# Patient Record
Sex: MALE | Race: WHITE
[De-identification: names, ages, dates, MRNs, and addresses within clinical notes are randomized per-mention and may not be internally consistent; named-entity substitution may affect disease eponyms.]

---

## 2017-09-27 ENCOUNTER — HOSPITAL ENCOUNTER (OUTPATIENT)
Dept: HOSPITAL 62 - ER | Age: 72
Setting detail: OBSERVATION
LOS: 1 days | Discharge: ANOTHER HOSPITAL | End: 2017-09-28
Attending: INTERNAL MEDICINE | Admitting: INTERNAL MEDICINE
Payer: COMMERCIAL

## 2017-09-27 DIAGNOSIS — M06.9: ICD-10-CM

## 2017-09-27 DIAGNOSIS — I25.10: ICD-10-CM

## 2017-09-27 DIAGNOSIS — Z79.899: ICD-10-CM

## 2017-09-27 DIAGNOSIS — Z95.828: ICD-10-CM

## 2017-09-27 DIAGNOSIS — I21.4: Primary | ICD-10-CM

## 2017-09-27 DIAGNOSIS — E78.5: ICD-10-CM

## 2017-09-27 DIAGNOSIS — J44.9: ICD-10-CM

## 2017-09-27 DIAGNOSIS — I48.0: ICD-10-CM

## 2017-09-27 DIAGNOSIS — Z79.02: ICD-10-CM

## 2017-09-27 DIAGNOSIS — Z95.5: ICD-10-CM

## 2017-09-27 DIAGNOSIS — Z98.890: ICD-10-CM

## 2017-09-27 DIAGNOSIS — Z82.49: ICD-10-CM

## 2017-09-27 DIAGNOSIS — Z79.52: ICD-10-CM

## 2017-09-27 DIAGNOSIS — Z79.82: ICD-10-CM

## 2017-09-27 DIAGNOSIS — I73.9: ICD-10-CM

## 2017-09-27 PROCEDURE — 80048 BASIC METABOLIC PNL TOTAL CA: CPT

## 2017-09-27 PROCEDURE — 80061 LIPID PANEL: CPT

## 2017-09-27 PROCEDURE — 93010 ELECTROCARDIOGRAM REPORT: CPT

## 2017-09-27 PROCEDURE — 84484 ASSAY OF TROPONIN QUANT: CPT

## 2017-09-27 PROCEDURE — G0378 HOSPITAL OBSERVATION PER HR: HCPCS

## 2017-09-27 PROCEDURE — 85025 COMPLETE CBC W/AUTO DIFF WBC: CPT

## 2017-09-27 PROCEDURE — 82553 CREATINE MB FRACTION: CPT

## 2017-09-27 PROCEDURE — 93005 ELECTROCARDIOGRAM TRACING: CPT

## 2017-09-27 PROCEDURE — 71010: CPT

## 2017-09-27 PROCEDURE — 99285 EMERGENCY DEPT VISIT HI MDM: CPT

## 2017-09-27 PROCEDURE — 36415 COLL VENOUS BLD VENIPUNCTURE: CPT

## 2017-09-28 VITALS — DIASTOLIC BLOOD PRESSURE: 63 MMHG | SYSTOLIC BLOOD PRESSURE: 120 MMHG

## 2017-09-28 LAB
ANION GAP SERPL CALC-SCNC: 12 MMOL/L (ref 5–19)
BASOPHILS # BLD AUTO: 0 10^3/UL (ref 0–0.2)
BASOPHILS NFR BLD AUTO: 0.3 % (ref 0–2)
BUN SERPL-MCNC: 34 MG/DL (ref 7–20)
CALCIUM: 9.9 MG/DL (ref 8.4–10.2)
CHLORIDE SERPL-SCNC: 104 MMOL/L (ref 98–107)
CHOLEST SERPL-MCNC: 125.55 MG/DL (ref 0–200)
CO2 SERPL-SCNC: 23 MMOL/L (ref 22–30)
CREAT SERPL-MCNC: 1.18 MG/DL (ref 0.52–1.25)
DIRECT HDL: 40 MG/DL (ref 40–?)
EOSINOPHIL # BLD AUTO: 0.2 10^3/UL (ref 0–0.6)
EOSINOPHIL NFR BLD AUTO: 1.4 % (ref 0–6)
ERYTHROCYTE [DISTWIDTH] IN BLOOD BY AUTOMATED COUNT: 22.7 % (ref 11.5–14)
GLUCOSE SERPL-MCNC: 130 MG/DL (ref 75–110)
HCT VFR BLD CALC: 38.3 % (ref 37.9–51)
HGB BLD-MCNC: 12.3 G/DL (ref 13.5–17)
HGB HCT DIFFERENCE: -1.4
LDLC SERPL DIRECT ASSAY-MCNC: < 30 MG/DL (ref ?–100)
LYMPHOCYTES # BLD AUTO: 0.7 10^3/UL (ref 0.5–4.7)
LYMPHOCYTES NFR BLD AUTO: 6.5 % (ref 13–45)
MCH RBC QN AUTO: 27.7 PG (ref 27–33.4)
MCHC RBC AUTO-ENTMCNC: 32.1 G/DL (ref 32–36)
MCV RBC AUTO: 86 FL (ref 80–97)
MONOCYTES # BLD AUTO: 0.3 10^3/UL (ref 0.1–1.4)
MONOCYTES NFR BLD AUTO: 2.7 % (ref 3–13)
NEUTROPHILS # BLD AUTO: 10.2 10^3/UL (ref 1.7–8.2)
NEUTS SEG NFR BLD AUTO: 89.1 % (ref 42–78)
POTASSIUM SERPL-SCNC: 4.3 MMOL/L (ref 3.6–5)
RBC # BLD AUTO: 4.45 10^6/UL (ref 4.35–5.55)
SODIUM SERPL-SCNC: 138.7 MMOL/L (ref 137–145)
TRIGL SERPL-MCNC: 201 MG/DL (ref ?–150)
VLDLC SERPL CALC-MCNC: 40.2 MG/DL (ref 10–31)
WBC # BLD AUTO: 11.4 10^3/UL (ref 4–10.5)

## 2017-09-28 NOTE — PDOC H&P
History of Present Illness


Admission Date/PCP: 


  09/28/17 02:37





  





Patient complains of: Palpitations and chest pain


History of Present Illness: 


GEOVANNI ISRAEL is a 72 year old male with a past medical history of paroxysmal 

atrial fibrillation, coronary artery disease, COPD, peripheral vascular disease

, rheumatoid arthritis and dyslipidemia who would been in his usual state of 

health until approximately 4 hours prior to presentation noting palpitations, 

tachycardia, retrosternal pressure-like 3 out of 5 chest pain nonradiating 

lasting approximately an hour with unclear exacerbating factors but relieved by 

nitro.  Prompting evaluation emergency room his initial workup was unremarkable 

he is pain-free.  He is referred to the hospitalist for admission.  Patient 

states last episode of paroxysmal atrial fibrillation was approximately 3 

months ago at which time he also had a negative Cardiolite stress test result.  

He denies any recent change in medications, excessive caffeine or alcohol and 

otherwise feels well.








Past Medical History


Cardiac Medical History: Reports: Myocardial Infarction


   Denies: Congestive Heart Failure, Hypertension


Pulmonary Medical History: Reports: Bronchitis, Chronic Obstructive Pulmonary 

Disease (COPD), Pneumonia


   Denies: Asthma, Tuberculosis


Neurological Medical History: 


   Denies: Seizures


Renal/ Medical History: 


   Denies: End Stage Renal Disease


GI Medical History: Reports: Gastroesophageal Reflux Disease


   Denies: Cirrhosis


Musculoskeltal Medical History: Reports: Arthritis


Psychiatric Medical History: 


   Denies: Bipolar Disorder, Depression


Hematology: 


   Denies: Anemia, Bleeding Tendencies





Past Surgical History


Past Surgical History: Reports: Cardiac Catheterization, Cholecystectomy, 

Coronary Stent, Vascular Surgery - Abdominal aortic aneurysm and bilateral 

femoropopliteal graft





Social History


Information Source: Patient


Lives with: Family


Smoking Status: Never Smoker


Frequency of Alcohol Use: None





- Advance Directive


Resuscitation Status: Full Code





Family History


Family History: CAD, COPD, Hypertension


Parental Family History Reviewed: Yes


Children Family History Reviewed: Yes


Sibling(s) Family History Reviewed.: Yes





Medication/Allergy


Allergies/Adverse Reactions: 


 





No Known Allergies Allergy (Unverified 09/28/17 03:56)


 











Review of Systems


Constitutional: ABSENT: chills, fever(s), headache(s), weight gain, weight loss


Eyes: ABSENT: visual disturbances


Ears: ABSENT: hearing changes


Cardiovascular: ABSENT: chest pain, dyspnea on exertion, edema, orthropnea, 

palpitations


Respiratory: ABSENT: cough, hemoptysis


Gastrointestinal: ABSENT: abdominal pain, constipation, diarrhea, hematemesis, 

hematochezia, nausea, vomiting


Genitourinary: ABSENT: dysuria, hematuria


Musculoskeletal: ABSENT: joint swelling


Integumentary: ABSENT: rash, wounds


Neurological: ABSENT: abnormal gait, abnormal speech, confusion, dizziness, 

focal weakness, syncope


Psychiatric: ABSENT: anxiety, depression, homidical ideation, suicidal ideation


Endocrine: ABSENT: cold intolerance, heat intolerance, polydipsia, polyuria


Hematologic/Lymphatic: ABSENT: easy bleeding, easy bruising





Physical Exam


Vital Signs: 


 











Temp Pulse Resp BP Pulse Ox


 


 97.7 F   59 L  17   144/80 H  100 


 


 09/28/17 00:03  09/28/17 00:03  09/28/17 02:28  09/28/17 02:28  09/28/17 02:28








 Intake & Output











 09/26/17 09/27/17 09/28/17





 11:59 11:59 11:59


 


Weight   81.4 kg











General appearance: PRESENT: no acute distress, well-developed, well-nourished


Head exam: PRESENT: atraumatic, normocephalic


Eye exam: PRESENT: conjunctiva pink, EOMI, PERRLA.  ABSENT: scleral icterus


Ear exam: PRESENT: normal external ear exam


Mouth exam: PRESENT: moist, tongue midline


Neck exam: ABSENT: carotid bruit, JVD, lymphadenopathy, thyromegaly


Respiratory exam: PRESENT: clear to auscultation hellen.  ABSENT: rales, rhonchi, 

wheezes


Cardiovascular exam: PRESENT: RRR.  ABSENT: diastolic murmur, rubs, systolic 

murmur


Pulses: PRESENT: normal dorsalis pedis pul


Vascular exam: PRESENT: normal capillary refill


GI/Abdominal exam: PRESENT: normal bowel sounds, soft.  ABSENT: distended, 

guarding, mass, organolmegaly, rebound, tenderness


Rectal exam: PRESENT: deferred


Extremities exam: PRESENT: full ROM.  ABSENT: calf tenderness, clubbing, pedal 

edema


Neurological exam: PRESENT: alert, awake, oriented to person, oriented to place

, oriented to time, oriented to situation, CN II-XII grossly intact.  ABSENT: 

motor sensory deficit


Psychiatric exam: PRESENT: appropriate affect, normal mood.  ABSENT: homicidal 

ideation, suicidal ideation


Skin exam: PRESENT: dry, intact, warm.  ABSENT: cyanosis, rash





Results


Impressions: 


 





Chest X-Ray  09/28/17 00:32


IMPRESSION:  Small right upper lobar atelectasis or scar.


 














Assessment & Plan





- Diagnosis


(1) Coronary artery disease


Is this a current diagnosis for this admission?: Yes   


Plan: 


Observation on a telemetry bed with chest pain care set serial cardiac enzymes, 

lipid profile and TSH.  Given the patient's negative Cardiolite stress test 3 

months ago I will not reorder.  Continue aspirin, beta-blocker, as needed nitro 

and full dose statin








(2) Paroxysmal atrial fibrillation


Is this a current diagnosis for this admission?: Yes   


Plan: 


Patient takes atenolol and Plavix with aspirin will continue Lopressor with 

Plavix and aspirin and monitor telemetry while ambulating








(3) Chest pain


Qualifiers: 


   Chest pain type: unspecified   Qualified Code(s): R07.9 - Chest pain, 

unspecified   


Is this a current diagnosis for this admission?: Yes   


Plan: 


Chest pain care set serial cardiac enzymes.  No Cardiolite stress test given 

negative results 3 months ago.








- Time


Time Spent: 50 to 70 Minutes

## 2017-09-28 NOTE — EKG REPORT
SEVERITY:- OTHERWISE NORMAL ECG -

SINUS RHYTHM

BORDERLINE LEFT AXIS DEVIATION

:

Confirmed by: Shruthi Rivas MD 28-Sep-2017 10:02:57

## 2017-09-28 NOTE — RADIOLOGY REPORT (SQ)
EXAM DESCRIPTION:  CHEST SINGLE VIEW



COMPLETED DATE/TIME:  9/28/2017 1:06 am



REASON FOR STUDY:  chest pain



COMPARISON:  None.



EXAM PARAMETERS:  NUMBER OF VIEWS: One view.

TECHNIQUE: Single frontal radiographic view of the chest acquired.

RADIATION DOSE: NA

LIMITATIONS: None.



FINDINGS:  LUNGS AND PLEURA: Small bandlike atelectasis -scar of the right upper lobe.  Prominent int
erstitium.  Mild hyperinflation.

MEDIASTINUM AND HILAR STRUCTURES: No masses.  Contour normal.

HEART AND VASCULAR STRUCTURES: Heart normal in size.  Atherosclerosis.

BONES: No acute findings.

HARDWARE: None in the chest.

OTHER: No other significant finding.



IMPRESSION:  Small right upper lobar atelectasis or scar.



TECHNICAL DOCUMENTATION:  JOB ID:  [id number]

## 2017-09-28 NOTE — PDOC TRANSFER SUMMARY
General


Admission Date/PCP: 


  09/28/17 02:37





  





Admission Date: 09/28/17


Transfer Date: 09/28/17


Accepting Facility: [facility]


Accepting Physician: Dr. Beaver


Resuscitation Status: Full Code





- Transfer Diagnosis


(1) NSTEMI (non-ST elevated myocardial infarction)


Is this a current diagnosis for this admission?: Yes   





(2) History of AAA (abdominal aortic aneurysm) repair


Is this a current diagnosis for this admission?: Yes   





(3) History of aorto-femoral bypass


Is this a current diagnosis for this admission?: Yes   





(4) Rheumatoid arthritis


Is this a current diagnosis for this admission?: Yes   





(5) COPD (chronic obstructive pulmonary disease)


Is this a current diagnosis for this admission?: Yes   





(6) Coronary artery disease


Is this a current diagnosis for this admission?: Yes   





(7) Paroxysmal atrial fibrillation


Is this a current diagnosis for this admission?: Yes   





- Transfer Medications


Home Medications: 


 





Aspirin [Ecotrin] 81 mg PO DAILY 09/28/17 


Atenolol [Tenormin 50 mg Tablet] 50 mg PO DAILY 09/28/17 


Clopidogrel Bisulfate [Plavix 75 mg Tablet] 75 mg PO DAILY 09/28/17 


Finasteride [Proscar 5 mg Tablet] 5 mg PO DAILY 09/28/17 


Folic Acid 0.4 mg PO DAILY 09/28/17 


Ibuprofen [Motrin 800 mg Tablet] 800 mg PO Q12 09/28/17 


Magnesium 250 mg PO DAILY 09/28/17 


Methotrexate Sodium [Methotrexate] 4 tab PO BID 09/28/17 


Multivitamin [Multivitamins] 1 cap PO DAILY 09/28/17 


Omeprazole 40 mg PO DAILY 09/28/17 


Potassium 200mg Otc 200 mg PO DAILY 09/28/17 


Prednisone [Deltasone 5 mg Tablet] 5 mg PO DAILY 09/28/17 


Simvastatin [Zocor 40 mg Tablet] 40 mg PO QHS 09/28/17 


Tamsulosin HCl [Flomax 0.4 mg Cap.sr] 0.4 mg PO DAILY 09/28/17 








Transfer Medications: 


 Current Medications





Atorvastatin Calcium (Lipitor 80 Mg Tablet)  80 mg PO QHS GILBERTO


   Stop: 10/28/17 21:59


Clopidogrel Bisulfate (Plavix 75 Mg Tablet)  75 mg PO DAILY GILBERTO


   Stop: 10/28/17 09:59


Docusate Sodium (Colace 100 Mg Capsule)  100 mg PO BID GILBERTO


   Stop: 10/28/17 09:59


Enoxaparin Sodium (Lovenox Inj 80 Mg/0.8 Ml Disp.Syrin)  80 mg SUBCUT Q12 GILBERTO


   Stop: 10/28/17 09:59


   Last Admin: 09/28/17 09:00 Dose:  80 mg


Folic Acid (Folvite 1 Mg Tablet)  1 mg PO DAILY GILBERTO


   Stop: 10/28/17 09:59


Metoprolol Tartrate (Lopressor 50 Mg Tablet)  50 mg PO Q12 GILBERTO


   Stop: 10/28/17 09:59


Nitroglycerin (Nitrostat 0.4 Mg (1/150 Gr) Tabs 25/Bottle)  1 tab SL ASDIR PRN


Sodium Chloride (Saline Flush 2.5 Ml Monoject Prefil Syrin)  2.5 ml IV Q8 GILBERTO


   Stop: 10/28/17 05:59


   Last Admin: 09/28/17 06:41 Dose:  2.5 ml











- Allergies


Allergies/Adverse Reactions: 


 





No Known Allergies Allergy (Unverified 09/28/17 03:56)


 











- Diet/Activity


Discharge Diet: Cardiac





Hospital Course


Hospital Course: 





Pt is a 71yo WM with a past medical history of paroxysmal atrial fibrillation, 

coronary artery disease, COPD, peripheral vascular disease, rheumatoid 

arthritis and dyslipidemia who would been in his usual state of health until 

approximately 4 hours prior to presentation noting palpitations, tachycardia, 

retrosternal pressure-like 3 out of 5 chest pain nonradiating lasting 

approximately an hour with unclear exacerbating factors but relieved by nitro.  

Prompting evaluation emergency room his initial workup was unremarkable he is 

pain-free.  He is referred to the hospitalist for admission.  Patient states 

last episode of paroxysmal atrial fibrillation was approximately 3 months ago 

at which time he also had a negative Cardiolite stress test result.  He denies 

any recent change in medications, excessive caffeine or alcohol and otherwise 

feels well.





Patient second troponin positive 2.41 with elevation of CKMB. Patient has no 

further chest pain. No ekg changes. Patient accepted for cardiac cath by Dr. Beaver. Medications which were ordered and not given 2/2 patient refusal per 

documentation. 





 











Generic Name Dose Route Start Last Admin





  Trade Name Freq  PRN Reason Stop Dose Admin


 


Atorvastatin Calcium  80 mg  09/28/17 22:00  





  Lipitor 80 Mg Tablet  PO  10/28/17 21:59  





  QHS [facility]   


 


Clopidogrel Bisulfate  75 mg  09/28/17 10:00  





  Plavix 75 Mg Tablet  PO  10/28/17 09:59  





  DAILY [facility]   


 


Enoxaparin Sodium  80 mg  09/28/17 10:00  09/28/17 09:00





  Lovenox Inj 80 Mg/0.8 Ml Disp.Syrin  SUBCUT  10/28/17 09:59  80 mg





  Q12 GILBERTO   


 


Metoprolol Tartrate  50 mg  09/28/17 10:00  





  Lopressor 50 Mg Tablet  PO  10/28/17 09:59  





  Q12 [facility]   


 


Nitroglycerin  1 tab  09/28/17 01:42  





  Nitrostat 0.4 Mg (1/150 Gr) Tabs 25/Bottle  SL   





  ASDIR PRN   














Discontinued Medications














Generic Name Dose Route Start Last Admin





  Trade Name Freq  PRN Reason Stop Dose Admin


 


Atorvastatin Calcium  80 mg  09/28/17 04:00  09/28/17 04:48





  Lipitor 80 Mg Tablet  PO  09/28/17 04:01  Not Given





  NOW ONE   


 


Lisinopril  10 mg  09/28/17 08:23  09/28/17 08:56





  Prinivil 10 Mg Tablet  PO  09/28/17 08:24  10 mg





  NOW ONE   











Physical Exam


Vital Signs: 


 











Temp Pulse Resp BP Pulse Ox


 


 97.5 F   101 H  16   120/63   93 


 


 09/28/17 07:39  09/28/17 07:39  09/28/17 07:39  09/28/17 07:39  09/28/17 07:39








 Intake & Output











 09/27/17 09/28/17 09/29/17





 06:59 06:59 06:59


 


Weight  81.4 kg 











General appearance: PRESENT: no acute distress, well-developed, well-nourished


Head exam: PRESENT: atraumatic, normocephalic


Eye exam: PRESENT: conjunctiva pink, EOMI, PERRLA.  ABSENT: scleral icterus


Ear exam: PRESENT: normal external ear exam


Mouth exam: PRESENT: moist, tongue midline


Neck exam: ABSENT: JVD, lymphadenopathy, thyromegaly, tracheal deviation


Respiratory exam: PRESENT: clear to auscultation hellen.  ABSENT: rales, rhonchi, 

wheezes


Cardiovascular exam: PRESENT: RRR.  ABSENT: diastolic murmur, rubs, systolic 

murmur


Pulses: PRESENT: normal dorsalis pedis pul


Vascular exam: PRESENT: normal capillary refill


GI/Abdominal exam: PRESENT: normal bowel sounds, soft.  ABSENT: distended, 

guarding, mass, organolmegaly, rebound, tenderness


Rectal exam: PRESENT: deferred


Extremities exam: PRESENT: full ROM.  ABSENT: calf tenderness, clubbing, pedal 

edema


Neurological exam: PRESENT: alert, awake, oriented to person, oriented to place

, oriented to time, oriented to situation, CN II-XII grossly intact.  ABSENT: 

motor sensory deficit


Psychiatric exam: PRESENT: appropriate affect, normal mood.  ABSENT: homicidal 

ideation, suicidal ideation


Skin exam: PRESENT: dry, intact, warm.  ABSENT: cyanosis, rash





Results


Laboratory Results: 


 











  09/28/17





  07:12


 


Triglycerides  201 H


 


Cholesterol  125.55


 


LDL Cholesterol Direct  < 30


 


VLDL Cholesterol  40.2 H


 


HDL Cholesterol  40








 











  09/28/17 09/28/17





  06:00 07:12


 


CK-MB (CK-2)   7.47 H


 


Troponin I  2.410 











Impressions: 


 





Chest X-Ray  09/28/17 00:32


IMPRESSION:  Small right upper lobar atelectasis or scar.


 














Plan


Time Spent: Greater than 30 Minutes

## 2017-09-28 NOTE — ER DOCUMENT REPORT
ED General





- General


Chief Complaint: Chest Pain


Stated Complaint: CHEST PAIN


Time Seen by Provider: 09/28/17 00:30


Notes: 





Patient is a 72-year-old male with a past medical history of coronary artery 

disease, hypertension, hyperlipidemia, paroxysmal atrial fibrillation, who 

presents with an episode of chest pain that started approximately 10 PM.  

Patient states that he had an episode of palpitations and then developed left-

sided chest pressure that was dull, constant, and moderately painful.  States 

he has had similar episodes in the past but has not had a repeat MRI since 

1993.  He just moved to the area from [place] and has not yet established a 

local primary care physician.  He denies any pain at time of my assessment 

stating that it resolved after he took nitroglycerin at home.  When the pain 

was present nothing worsened it.  He denies any associated shortness of breath, 

vomiting but notes he did get diaphoretic.


TRAVEL OUTSIDE OF THE [place] IN LAST 30 DAYS: No





Past Medical History





- General


Information source: Patient





- Social History


Smoking Status: Never Smoker


Frequency of alcohol use: None


Drug Abuse: None


Lives with: Family


Family History: Reviewed & Not Pertinent


Patient has suicidal ideation: No


Patient has homicidal ideation: No


Renal/ Medical History: Denies: Hx Peritoneal Dialysis





Review of Systems





- Review of Systems


Notes: 





Constitutional: Negative for fever.


HENT: Negative for sore throat.


Eyes: Negative for visual changes.


Cardiovascular: Positive for chest pain.


Respiratory: Negative for shortness of breath.


Gastrointestinal: Negative for abdominal pain, vomiting or diarrhea.


Genitourinary: Negative for dysuria.


Musculoskeletal: Negative for back pain.


Skin: Negative for rash.


Neurological: Negative for headaches, weakness or numbness.





10 point ROS negative except as marked above and in HPI.





Physical Exam





- Vital signs


Vitals: 


 











Temp Pulse Resp BP Pulse Ox


 


 97.7 F   59 L  18   94/67 L  97 


 


 09/28/17 00:03  09/28/17 00:03  09/28/17 00:03  09/28/17 00:03  09/28/17 00:03











Interpretation: Bradycardic


Notes: 





PHYSICAL EXAMINATION:





GENERAL: Well-appearing, well-nourished and in no acute distress.





HEAD: Atraumatic, normocephalic.





EYES: Pupils equal round and reactive to light, extraocular movements intact, 

sclera anicteric, conjunctiva are normal.





ENT: nares patent, oropharynx clear without exudates.  Moist mucous membranes.





NECK: Normal range of motion, supple without lymphadenopathy





LUNGS: Breath sounds clear to auscultation bilaterally and equal.  No wheezes 

rales or rhonchi.





HEART: Regular rate and rhythm without murmurs





ABDOMEN: Soft, nontender, normoactive bowel sounds.  No guarding, no rebound.  

No masses appreciated.





EXTREMITIES: Normal range of motion, no pitting or edema.  No cyanosis.





NEUROLOGICAL: No focal neurological deficits. Moves all extremities 

spontaneously and on command.





PSYCH: Normal mood, normal affect.





SKIN: Warm, Dry, normal turgor, no rashes or lesions noted.





Course





- Re-evaluation


Re-evalutation: 





09/28/17 00:55


Patient presents with an episode of palpitations with associated pain that 

occurred approximately 2 hours prior to presentation.  At time of arrival he 

denies any ongoing pain or palpitations and is now asymptomatic.  States he has 

had multiple similar episodes in the past with bouts of paroxysmal atrial 

fibrillation.  On assessment, patient has no focal findings on physical 

examination.  EKG is a normal sinus rhythm.  Will obtain a troponin now and 

continue on cardiac monitor





09/28/17 01:42


Initial troponin has returned in the indeterminate range at 0.053.  Will 

discuss with the hospitalist for admission for serial cardiac enzymes and 

continued cardiac monitoring.











- Vital Signs


Vital signs: 


 











Temp Pulse Resp BP Pulse Ox


 


 97.7 F   59 L  17   144/80 H  100 


 


 09/28/17 00:03  09/28/17 00:03  09/28/17 02:28  09/28/17 02:28  09/28/17 02:28














- Laboratory


Result Diagrams: 


 09/28/17 00:43





 09/28/17 00:43


Laboratory results interpreted by me: 


 











  09/28/17 09/28/17





  00:43 00:43


 


WBC  11.4 H 


 


Hgb  12.3 L 


 


RDW  22.7 H 


 


Seg Neutrophils %  89.1 H 


 


Lymphocytes %  6.5 L 


 


Monocytes %  2.7 L 


 


Absolute Neutrophils  10.2 H 


 


BUN   34 H


 


Glucose   130 H














- Diagnostic Test


Radiology reviewed: Image reviewed, Reports reviewed


Radiology results interpreted by me: 





09/28/17 01:43


Chest x-ray: No acute infiltrate or pneumothorax





- EKG Interpretation by Me


Additional EKG results interpreted by me: 





09/28/17 01:43


Normal sinus rhythm.  Rate 63.  No ST elevations or depressions.  QTC is 430.





Discharge





- Discharge


Clinical Impression: 


 Elevated troponin





Chest pain


Qualifiers:


 Chest pain type: unspecified Qualified Code(s): R07.9 - Chest pain, unspecified





Condition: Fair


Disposition: ADMITTED AS OBSERVATION


[facility]


Unit Admitted: Telemetry

## 2018-03-21 ENCOUNTER — HOSPITAL ENCOUNTER (OUTPATIENT)
Dept: HOSPITAL 62 - RAD | Age: 73
End: 2018-03-21
Attending: SURGERY
Payer: MEDICARE

## 2018-03-21 DIAGNOSIS — I65.23: Primary | ICD-10-CM

## 2018-03-21 PROCEDURE — 82565 ASSAY OF CREATININE: CPT

## 2018-03-21 PROCEDURE — 70496 CT ANGIOGRAPHY HEAD: CPT

## 2018-03-21 PROCEDURE — 70498 CT ANGIOGRAPHY NECK: CPT

## 2018-03-21 NOTE — RADIOLOGY REPORT (SQ)
EXAM DESCRIPTION:  CTA NECK



COMPLETED DATE/TIME:  3/21/2018 4:01 pm



REASON FOR STUDY:  OCCLUSION AND STENOSIS OF BILATERAL CAROTID ARTERIES I65.23  OCCLUSION AND STENOSI
S OF BILATERAL CAROTID ARTERIES



COMPARISON:  None.



TECHNIQUE:  Axial dynamic scanning technique with  dynamic contrast enhancement through the extra-cra
nial carotid and vertebral  arteries.  Multiplanar reconstruction.  3-D MIPS and Volume-rendered imag
es  acquired at the workstation and saved to PACS.  Images are reviewed in soft  tissue, bone, lung w
indows.

All CT scanners at this facility use dose modulation, iterative reconstruction, and/or weight based d
osing when appropriate to reduce radiation dose to as low as reasonably achievable (ALARA).

[facility]: Dose Right  [facility]: CareDose    [facility]: Dose Right    [facility]: Teradose 4D    [facility]: Smart Technologies



CONTRAST TYPE AND DOSE:  80 mL Isovue 370



RENAL FUNCTION:  Creatinine 1.2



LIMITATIONS:  None.



FINDINGS:  AORTIC ARCH: Normal three-vessel origin.  Bilateral subclavian arteries are patent.  No  d
issection.

RIGHT CAROTIDS: Patent common, internal and external carotid arteries without suggestion of significa
nt stenosis or irregular plaque.  No dissection.  Vascular calcifications are identified at the level
 of the carotid bifurcation

RIGHT VERTEBRAL: Patent.  No dissection.

LEFT CAROTIDS: Patent common and external carotid arteries without suggestion of significant stenosis
 or irregular plaque.  No dissection.  There is occlusion of the left internal carotid artery at its 
origin

LEFT VERTEBRAL: Patent.  No dissection.

OTHER: No other significant finding.

OTHER: 3-D  reconstructions confirm findings.



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  No other vascular
 occlusions or significant stenoses are identified.



COMMENT:  Quality ID #195: Measurements of distal internal carotid diameter were used as the denomina
tor for stenosis measurement.



TECHNICAL DOCUMENTATION:  JOB ID:  [id number]

Quality ID # 436: Final reports with documentation of one or more dose reduction techniques (e.g., Au
tomated exposure control, adjustment of the mA and/or kV according to patient size, use of iterative 
reconstruction technique)

 2011 [employer]- All Rights Reserved



Reading location - IP/workstation name: [email or web address]

## 2018-03-21 NOTE — RADIOLOGY REPORT (SQ)
EXAM DESCRIPTION:  CTA HEAD



COMPLETED DATE/TIME:  3/21/2018 4:01 pm



REASON FOR STUDY:  OCCLUSION AND STENOSIS OF BILATERAL CAROTID ARTERIES I65.23  OCCLUSION AND STENOSI
S OF BILATERAL CAROTID ARTERIES



COMPARISON:  None.



TECHNIQUE:  Post IV contrast scanning, thin section axial imaging through the brain to evaluate the a
rterial structures.  Source and MIP images are saved and reviewed on PACS.

Advanced 3D imaging as volume-rendering, MIPs, SSD performed? No

All CT scanners at this facility use dose modulation, iterative reconstruction, and/or weight based d
osing when appropriate to reduce radiation dose to as low as reasonably achievable (ALARA).

[facility]: Dose Right  [facility]: CareDose    [facility]: Dose Right    [facility]: Teradose 4D    [facility]: Smart Technologies



CONTRAST TYPE AND DOSE:  contrast/concentration: Isovue 370.00 mg/ml; Total Contrast Delivered: 80.0 
ml; Total Saline Delivered: 75.0 ml



RENAL FUNCTION:  Creatinine 1.2



LIMITATIONS:  None.



FINDINGS:  [place]: The anterior, middle, posterior cerebral arteries are all patent.  No ev
idence of aneurysm or focal stenosis.

POSTERIOR CIRCULATION: The distal vertebral arteries are patent as is the basilar artery. No aneurysm
.

BRAIN: No gross enhancing lesions as visualized.  The superior cerebral hemispheres are not included 
in the field of view.

BONES: Intact as visualized.

SINUSES: No fluid or mucosal thickening.

OTHER: There is occlusion of the left internal carotid artery.



IMPRESSION:  NO CTA EVIDENCE OF STENOSIS OR ANEURYSM OF THE [place].  There is occlusion of 
the left internal carotid artery.



TECHNICAL DOCUMENTATION:  JOB ID:  [id number]

Quality ID # 436: Final reports with documentation of one or more dose reduction techniques (e.g., Au
tomated exposure control, adjustment of the mA and/or kV according to patient size, use of iterative 
reconstruction technique)

 2011 [employer]- All Rights Reserved



Reading location - IP/workstation name: [email or web address]

## 2018-09-22 ENCOUNTER — HOSPITAL ENCOUNTER (EMERGENCY)
Dept: HOSPITAL 62 - ER | Age: 73
Discharge: HOME | End: 2018-09-22
Payer: MEDICARE

## 2018-09-22 VITALS — SYSTOLIC BLOOD PRESSURE: 130 MMHG | DIASTOLIC BLOOD PRESSURE: 70 MMHG

## 2018-09-22 DIAGNOSIS — R00.1: ICD-10-CM

## 2018-09-22 DIAGNOSIS — M54.9: Primary | ICD-10-CM

## 2018-09-22 LAB
ADD MANUAL DIFF: NO
ALBUMIN SERPL-MCNC: 4 G/DL (ref 3.5–5)
ALP SERPL-CCNC: 67 U/L (ref 38–126)
ALT SERPL-CCNC: 23 U/L (ref 21–72)
ANION GAP SERPL CALC-SCNC: 8 MMOL/L (ref 5–19)
AST SERPL-CCNC: 18 U/L (ref 17–59)
BASOPHILS # BLD AUTO: 0.1 10^3/UL (ref 0–0.2)
BASOPHILS NFR BLD AUTO: 0.6 % (ref 0–2)
BILIRUB DIRECT SERPL-MCNC: 0.7 MG/DL (ref 0–0.4)
BILIRUB SERPL-MCNC: 1.4 MG/DL (ref 0.2–1.3)
BUN SERPL-MCNC: 27 MG/DL (ref 7–20)
CALCIUM: 9.9 MG/DL (ref 8.4–10.2)
CHLORIDE SERPL-SCNC: 107 MMOL/L (ref 98–107)
CO2 SERPL-SCNC: 26 MMOL/L (ref 22–30)
EOSINOPHIL # BLD AUTO: 0.1 10^3/UL (ref 0–0.6)
EOSINOPHIL NFR BLD AUTO: 1 % (ref 0–6)
ERYTHROCYTE [DISTWIDTH] IN BLOOD BY AUTOMATED COUNT: 16.9 % (ref 11.5–14)
GLUCOSE SERPL-MCNC: 115 MG/DL (ref 75–110)
HCT VFR BLD CALC: 42.4 % (ref 37.9–51)
HGB BLD-MCNC: 14.3 G/DL (ref 13.5–17)
LYMPHOCYTES # BLD AUTO: 0.5 10^3/UL (ref 0.5–4.7)
LYMPHOCYTES NFR BLD AUTO: 6 % (ref 13–45)
MCH RBC QN AUTO: 32.7 PG (ref 27–33.4)
MCHC RBC AUTO-ENTMCNC: 33.8 G/DL (ref 32–36)
MCV RBC AUTO: 97 FL (ref 80–97)
MONOCYTES # BLD AUTO: 0.3 10^3/UL (ref 0.1–1.4)
MONOCYTES NFR BLD AUTO: 3.3 % (ref 3–13)
NEUTROPHILS # BLD AUTO: 7.3 10^3/UL (ref 1.7–8.2)
NEUTS SEG NFR BLD AUTO: 89.1 % (ref 42–78)
PLATELET # BLD: 166 10^3/UL (ref 150–450)
POTASSIUM SERPL-SCNC: 4.8 MMOL/L (ref 3.6–5)
PROT SERPL-MCNC: 7.1 G/DL (ref 6.3–8.2)
RBC # BLD AUTO: 4.38 10^6/UL (ref 4.35–5.55)
SODIUM SERPL-SCNC: 140.5 MMOL/L (ref 137–145)
TOTAL CELLS COUNTED % (AUTO): 100 %
WBC # BLD AUTO: 8.2 10^3/UL (ref 4–10.5)

## 2018-09-22 PROCEDURE — 96374 THER/PROPH/DIAG INJ IV PUSH: CPT

## 2018-09-22 PROCEDURE — 84484 ASSAY OF TROPONIN QUANT: CPT

## 2018-09-22 PROCEDURE — 71275 CT ANGIOGRAPHY CHEST: CPT

## 2018-09-22 PROCEDURE — 80053 COMPREHEN METABOLIC PANEL: CPT

## 2018-09-22 PROCEDURE — 85025 COMPLETE CBC W/AUTO DIFF WBC: CPT

## 2018-09-22 PROCEDURE — 93005 ELECTROCARDIOGRAM TRACING: CPT

## 2018-09-22 PROCEDURE — 93010 ELECTROCARDIOGRAM REPORT: CPT

## 2018-09-22 PROCEDURE — 36415 COLL VENOUS BLD VENIPUNCTURE: CPT

## 2018-09-22 PROCEDURE — 99284 EMERGENCY DEPT VISIT MOD MDM: CPT

## 2018-09-22 NOTE — RADIOLOGY REPORT (SQ)
EXAM DESCRIPTION:  CTA CHEST



COMPLETED DATE/TIME:  9/22/2018 3:09 pm



REASON FOR STUDY:  RIPPING PAIN IN CHEST



COMPARISON:  AP chest 9/28/2017



TECHNIQUE:  CT scan of the chest performed using helical scanning technique with dynamic intravenous 
contrast injection.  Images reviewed with lung, soft tissue and bone windows.  Reconstructed coronal 
and sagittal MPR images reviewed.

Additional 3 dimensional post-processing performed to develop Maximal Intensity Projection images (MI
P).  All images stored on PACS.

All CT scanners at this facility use dose modulation, iterative reconstruction, and/or weight based d
osing when appropriate to reduce radiation dose to as low as reasonably achievable (ALARA).

[facility]: Dose Right  [facility]: CareDose    [facility]: Dose Right    [facility]: Teradose 4D    [facility]: Smart Technologies



CONTRAST TYPE AND DOSE:  contrast/concentration: Isovue 350.00 mg/ml; Total Contrast Delivered: 75.0 
ml; Total Saline Delivered: 75.0 ml

Contrast bolus optimized for the pulmonary arteries and the aorta.



RENAL FUNCTION:  Creatinine 1.17



RADIATION DOSE:  CT Rad equipment meets quality standard of care and radiation dose reduction techniq
ues were employed. CTDIvol: 23.3 - 59.5 mGy. DLP: 973 mGy-cm. .



LIMITATIONS:  None.



FINDINGS:  LUNGS AND PLEURA: There is chronic bronchiectasis and scarring with mucous plugging in the
 right lower lobe

Upper lobes are hyperinflated and hyperlucent from obstructive disease with biapical pleuroparenchyma
l scarring.

No acute infiltrates.  No pleural effusion.  No pneumothorax.

AORTA AND GREAT VESSELS: Heavily calcified aortic arch proximal great vessels with about 50% stenosis
 proximal right brachiocephalic artery proximal right common carotid artery at their origin.

HEART: No pericardial effusion. Heavily calcified native coronary arteries with multiple stents prese
nt

PULMONARY ARTERIES: No emboli visualized in the main pulmonary arteries or the segmental branches.

HILAR AND MEDIASTINAL STRUCTURES: No identified masses or abnormal nodes.

HARDWARE: None in the chest.

UPPER ABDOMEN: High-grade stenosis proximal superior mesenteric artery axial images 125-130 and sagit
chelsea reconstruction images 45-48.  8 mm left upper pole intrarenal nonobstructive kidney stone, 1,481 
Hounsfield units.

THYROID AND OTHER SOFT TISSUES: No masses.  No adenopathy.

BONES: Chronic appearing 25% T6 compression deformity, 50% T7 compression deformity, and T12 greater 
than 50% compression deformity.

3D MIPS: Confirm above findings.

OTHER: No other significant finding.



IMPRESSION:  No CT angio evidence of acute thoracic aortic dissection or acute pulmonary emboli

There is bronchiectasis and mucous plugging in the right lower lobe with chronic appearing atelectasi
s or scarring



COMMENT:  Quality ID # [id number]: Final reports with documentation of one or more dose reduction techniques
 (e.g., Automated exposure control, adjustment of the mA and/or kV according to patient size, use of 
iterative reconstruction technique)



TECHNICAL DOCUMENTATION:  JOB ID:  [id number]

 2011 [employer]- All Rights Reserved



Reading location - IP/workstation name: [email or web address]

## 2018-09-22 NOTE — EKG REPORT
SEVERITY:- OTHERWISE NORMAL ECG -

SINUS BRADYCARDIA

:

Confirmed by: Shruthi Rivas MD 22-Sep-2018 20:06:47

## 2018-09-22 NOTE — ER DOCUMENT REPORT
ED General





- General


Chief Complaint: Back Pain


Stated Complaint: BACK PAIN


Time Seen by Provider: 09/22/18 13:45


Notes: 





73-year-old male sent over by primary care physician's office for evaluation of 

back pain.  Patient states for the last 7 days he has been sleeping on a 

pullout couch and his back hurts.  Went to his primary doctor and they were 

concerned that he may have something worse so sent him here to be ruled out for 

a "aortic aneurysm".  There were nervous about his heart rate being in the 40s.

  Patient states his heart rate is been in the 40s for years because he takes a 

beta-blocker.  Family member belches for this as well.  Patient states that his 

pain is in the upper posterior and lateral chest walls bilaterally.


TRAVEL OUTSIDE OF THE [place] IN LAST 30 DAYS: No





- Related Data


Allergies/Adverse Reactions: 


 





No Known Allergies Allergy (Verified 09/22/18 12:38)


 











Past Medical History





- General


Information source: Patient





- Social History


Smoking Status: Never Smoker


Frequency of alcohol use: None


Drug Abuse: None


Lives with: Family


Family History: CAD, COPD, Hypertension


Patient has suicidal ideation: No


Patient has homicidal ideation: No





- Past Medical History


Cardiac Medical History: Reports: Hx Heart Attack


   Denies: Hx Congestive Heart Failure, Hx Hypertension


Pulmonary Medical History: Reports: Hx Bronchitis, Hx COPD, Hx Pneumonia


   Denies: Hx Asthma, Hx Tuberculosis


Neurological Medical History: Denies: Hx Seizures


Renal/ Medical History: Reports: Hx Kidney Stones.  Denies: Hx Benign 

Prostatic Hyperplasia, Hx End Stage Renal Disease, Hx Peritoneal Dialysis


GI Medical History: Reports: Hx Gastroesophageal Reflux Disease.  Denies: Hx 

Cirrhosis, Hx Ulcer


Musculoskeletal Medical History: Reports Hx Arthritis, Denies Hx Multiple 

Sclerosis


Psychiatric Medical History: 


   Denies: Hx Bipolar Disorder, Hx Depression, Hx Schizophrenia


Past Surgical History: Reports: Hx Cardiac Catheterization, Hx Cholecystectomy, 

Hx Coronary Stent, Hx Vascular Surgery - Abdominal aortic aneurysm and 

bilateral femoropopliteal graft





- Immunizations


Hx Pneumococcal Vaccination: 01/01/15





Review of Systems





- Review of Systems


Notes: 





Constitutional: denies: Chills, Diaphoresis, Fever, Malaise, Weakness





EENT: denies: Eye discharge, Blurred vision, Tearing, Double vision, Nose 

congestion, Nose discharge, Throat swelling, Mouth pain





Cardiovascular: denies: Palpitations, Heart racing, Orthopnea, Dyspnea, Chest 

pain.  Does have a history of bradycardia which is chronic.





Respiratory: denies: Cough, Hurts to breathe, Wheezing, Shortness of breath





Gastrointestinal: denies: Abdominal pain, Diarrhea, Nausea, Vomiting, Black 

stools, bright red blood in stool





Genitourinary: denies: Burning, Dysuria, Discharge, Frequency, Flank pain, 

Hematuria





Musculoskeletal: Complains of bilateral posterior and lateral chest wall pain.  

Denies any joint pain or joint swelling.





Hematologic/Lymphatic:  denies: Anemia, Easy bleeding, Easy bruising, Blood 

clots





Neurological/Psychological: denies: Confusion, Dementia, Depression, Loss of 

consciousness





Skin: No lesions, no masses, no skin breakdown, no abscesses





Physical Exam





- Vital signs


Vitals: 


 











Temp Pulse Resp BP Pulse Ox


 


 98.2 F   48 L  18   132/68 H  94 


 


 09/22/18 12:53  09/22/18 12:53  09/22/18 12:53  09/22/18 12:53  09/22/18 12:53











Interpretation: Bradycardic





- General


General appearance: Appears well, Alert





- HEENT


Head: Normocephalic, Atraumatic


Eyes: Normal


Pupils: PERRL





- Respiratory


Respiratory status: No respiratory distress


Chest status: Nontender


Breath sounds: Normal


Chest palpation: Normal





- Cardiovascular


Rhythm: Bradycardia


Heart sounds: Normal auscultation


Murmur: No





- Abdominal


Inspection: Normal


Distension: No distension


Bowel sounds: Normal


Tenderness: Nontender


Organomegaly: No organomegaly





- Back


Back: Normal, Nontender





- Extremities


General upper extremity: Normal inspection, Nontender, Normal color, Normal ROM

, Normal temperature


General lower extremity: Normal inspection, Nontender, Normal color, Normal ROM

, Normal temperature, Normal weight bearing.  No: Yony's sign





- Neurological


Neuro grossly intact: Yes


Cognition: Normal


Orientation: AAOx4


[place] Coma Scale Eye Opening: Spontaneous


[place] Coma Scale Verbal: Oriented


[place] Coma Scale Motor: Obeys Commands


[place] Coma Scale Total: 15


Speech: Normal


Motor strength normal: LUE, RUE, LLE, RLE


Sensory: Normal





- Psychological


Associated symptoms: Normal affect, Normal mood





- Skin


Skin Temperature: Warm


Skin Moisture: Dry


Skin Color: Normal





Course





- Re-evaluation


Re-evalutation: 





09/22/18 15:36





Laboratory











  09/22/18 09/22/18 09/22/18





  14:03 14:03 14:03


 


WBC  8.2  


 


RBC  4.38  


 


Hgb  14.3  


 


Hct  42.4  


 


MCV  97  


 


MCH  32.7  


 


MCHC  33.8  


 


RDW  16.9 H  


 


Plt Count  166  


 


Seg Neutrophils %  89.1 H  


 


Lymphocytes %  6.0 L  


 


Monocytes %  3.3  


 


Eosinophils %  1.0  


 


Basophils %  0.6  


 


Absolute Neutrophils  7.3  


 


Absolute Lymphocytes  0.5  


 


Absolute Monocytes  0.3  


 


Absolute Eosinophils  0.1  


 


Absolute Basophils  0.1  


 


Sodium   140.5 


 


Potassium   4.8 


 


Chloride   107 


 


Carbon Dioxide   26 


 


Anion Gap   8 


 


BUN   27 H 


 


Creatinine   1.17 


 


Est GFR ( Amer)   > 60 


 


Est GFR (Non-Af Amer)   > 60 


 


Glucose   115 H 


 


Calcium   9.9 


 


Total Bilirubin   1.4 H 


 


Direct Bilirubin   0.7 H 


 


Neonat Total Bilirubin   Not Reportable 


 


Neonat Direct Bilirubin   Not Reportable 


 


Neonat Indirect Bili   Not Reportable 


 


AST   18 


 


ALT   23 


 


Alkaline Phosphatase   67 


 


Troponin I    < 0.012


 


Total Protein   7.1 


 


Albumin   4.0 














 





Chest/Abdomen CTA  09/22/18 13:55


IMPRESSION:  No CT angio evidence of acute thoracic aortic dissection or acute 

pulmonary emboli


There is bronchiectasis and mucous plugging in the right lower lobe with 

chronic appearing atelectasis or scarring


 











09/22/18 15:46


There is no evidence of thoracic aortic aneurysm or dissection.  Labs are 

normal.  Likely patient does have some musculoskeletal back pain after sleeping 

on a pullout counts for the last week.  We will give him some pain medication.  

Advise close follow-up.  Will DC at this time.  Of note, patient has chronic 

bradycardia and is stable in my opinion.





- Vital Signs


Vital signs: 


 











Temp Pulse Resp BP Pulse Ox


 


 98.2 F   48 L  18   132/68 H  94 


 


 09/22/18 12:53  09/22/18 12:53  09/22/18 12:53  09/22/18 12:53  09/22/18 12:53














- Laboratory


Result Diagrams: 


 09/22/18 14:03





 09/22/18 14:03


Laboratory results interpreted by me: 


 











  09/22/18 09/22/18





  14:03 14:03


 


RDW  16.9 H 


 


Seg Neutrophils %  89.1 H 


 


Lymphocytes %  6.0 L 


 


BUN   27 H


 


Glucose   115 H


 


Total Bilirubin   1.4 H


 


Direct Bilirubin   0.7 H














- EKG Interpretation by Me


EKG shows normal: Sinus rhythm, Axis, Intervals, QRS Complexes, ST-T Waves


Rate: Bradycardia





Discharge





- Discharge


Clinical Impression: 


 Acute back pain less than 4 weeks duration, Bradycardia with 41-50 beats per 

minute





Condition: Good


Disposition: HOME, SELF-CARE


Instructions:  Muscle Strain ([facility])


Additional Instructions: 


Please follow-up with your regular doctor if symptoms persist.  You may take 

pain medication that is prescribed.  It is advisable while taking this pain 

medication to take a stool softener as this medication can cause constipation.  

Please return for any worsening symptoms or concerns.


Prescriptions: 


Docusate Sodium [Colace 100 mg Capsule] 100 mg PO BID 7 Days #14 capsule


Hydrocodone/Acetaminophen [Norco 5-325 mg Tablet] 1 tab PO TID PRN 4 Days #12 

tablet


 PRN Reason: For Pain

## 2018-12-12 ENCOUNTER — HOSPITAL ENCOUNTER (OUTPATIENT)
Dept: HOSPITAL 62 - RAD | Age: 73
End: 2018-12-12
Payer: MEDICARE

## 2018-12-12 DIAGNOSIS — R07.81: ICD-10-CM

## 2018-12-12 DIAGNOSIS — M54.6: Primary | ICD-10-CM

## 2018-12-12 PROCEDURE — 72070 X-RAY EXAM THORAC SPINE 2VWS: CPT

## 2018-12-12 NOTE — RADIOLOGY REPORT (SQ)
EXAM DESCRIPTION:  T SPINE AP/LAT



COMPLETED DATE/TIME:  12/12/2018 7:39 pm



REASON FOR STUDY:  M54.6 THORACIC SPINE PAIN R07.81 PLEURODYNIA M54.6  PAIN IN THORACIC SPINE R07.81 
 PLEURODYNIA



COMPARISON:  None.



NUMBER OF VIEWS:  Two views.



TECHNIQUE:  AP and lateral radiographic images acquired of the thoracic spine.



LIMITATIONS:  None.



FINDINGS:  MINERALIZATION: Osteopenia.

ALIGNMENT: Normal.  No scoliosis.

VERTEBRAE: There is approximately 50% loss of height at T12.  There is 20 to 30% loss of height at T9
.  There is 20 to 30% loss of height at T7.  Due to severe osteopenia upper dorsal spine is difficult
 to evaluate but appears grossly intact.

DISCS: Multilevel disc space narrowing with osteophytes.

HARDWARE: None in the spine.

MEDIASTINUM AND SOFT TISSUES: Normal heart size and aortic contour.  No soft tissue abnormality.

VISUALIZED LUNG FIELDS: Clear.

OTHER: No other significant finding.



IMPRESSION:  Multiple thoracic spine compression deformities.  T9 is new from September of this year.
  T12 and T7 are stable.  There is a compression deformity at T6 as well better demonstrated on recen
t CT.



TECHNICAL DOCUMENTATION:  JOB ID:  [id number]

 2011 [employer]- All Rights Reserved



Reading location - IP/workstation name: [email or web address]

## 2018-12-12 NOTE — RADIOLOGY REPORT (SQ)
EXAM DESCRIPTION:  RIBS LEFT W/O PA CHEST



COMPLETED DATE/TIME:  12/12/2018 7:39 pm



REASON FOR STUDY:  RIB PAIN R07.81 PLEURODYNIA M54.6  PAIN IN THORACIC SPINE R07.81  PLEURODYNIA



COMPARISON:  None.



NUMBER OF VIEWS:  Two views.



TECHNIQUE:  Images acquired of the left ribs in the area of focal concern.



LIMITATIONS:  None.



FINDINGS:  RIBS: No acute displaced fracture.  No worrisome bone lesions.

LUNGS: Limited exam.  No obvious pneumothorax.  No pleural effusion.

OTHER: No other significant finding.



IMPRESSION:  NO ACUTE DISPLACED RIB FRACTURE.



COMMENT:  SITE OF TRAUMA/COMPLAINT MARKED/STAMP COMPLETED: YES.



TECHNICAL DOCUMENTATION:  JOB ID:  [id number]

 2011 [employer]- All Rights Reserved



Reading location - IP/workstation name: [email or web address]

## 2018-12-14 ENCOUNTER — HOSPITAL ENCOUNTER (EMERGENCY)
Dept: HOSPITAL 62 - ER | Age: 73
LOS: 1 days | Discharge: HOME | End: 2018-12-15
Payer: MEDICARE

## 2018-12-14 VITALS — SYSTOLIC BLOOD PRESSURE: 135 MMHG | DIASTOLIC BLOOD PRESSURE: 96 MMHG

## 2018-12-14 DIAGNOSIS — Z90.49: ICD-10-CM

## 2018-12-14 DIAGNOSIS — M54.6: Primary | ICD-10-CM

## 2018-12-14 DIAGNOSIS — I25.2: ICD-10-CM

## 2018-12-14 DIAGNOSIS — J44.9: ICD-10-CM

## 2018-12-14 DIAGNOSIS — Z87.442: ICD-10-CM

## 2018-12-14 DIAGNOSIS — Z87.891: ICD-10-CM

## 2018-12-14 PROCEDURE — 99283 EMERGENCY DEPT VISIT LOW MDM: CPT

## 2018-12-15 NOTE — ER DOCUMENT REPORT
ED General





- General


Chief Complaint: Back Pain


Stated Complaint: BACK PAIN


Time Seen by Provider: 12/15/18 00:01


Notes: 


Patient is a 73-year-old male who presents with complaint of back pain.  He has 

had some back pain in the past.  Recently he was standing and then turned to do 

something and when he turned he felt a sharp pain in his back followed by a 

burning sensation behind his left shoulder.  Since then he has had recurrent 

pain whenever he moves and the pain radiates from the mid back into behind the 

left shoulder blade.  He was seen in urgent care and had x-rays performed.  X-

rays showed he has multiple compression fractures throughout his thoracic 

spine.  All these were old and seen on the previous scan from September except 

for the compression fracture of T5.  He denies any new weakness or numbness in 

his legs.  He says his legs are always generally weak and this has not changed 

and is not worsened.  He walks with a walker.  He denies any numbness.  No 

weakness numbness into the upper extremities.  No loss of bowel control.  No 

urinary retention.  He is prescribed Ultram which he says has not affected his 

pain in any way.  He has hydrocodone in the past and says it helps some.  He 

has never had Lyrica or gabapentin.





TRAVEL OUTSIDE OF THE [place] IN LAST 30 DAYS: No





- Related Data


Allergies/Adverse Reactions: 


 





No Known Allergies Allergy (Verified 09/22/18 12:38)


 











Past Medical History





- Social History


Smoking Status: Former Smoker


Frequency of alcohol use: None


Drug Abuse: None


Family History: CAD, COPD, Hypertension


Patient has suicidal ideation: No


Patient has homicidal ideation: No





- Past Medical History


Cardiac Medical History: Reports: Hx Heart Attack


   Denies: Hx Congestive Heart Failure, Hx Hypertension


Pulmonary Medical History: Reports: Hx Bronchitis, Hx COPD, Hx Pneumonia


   Denies: Hx Asthma, Hx Tuberculosis


Neurological Medical History: Denies: Hx Seizures


Renal/ Medical History: Reports: Hx Kidney Stones.  Denies: Hx Benign 

Prostatic Hyperplasia, Hx End Stage Renal Disease, Hx Peritoneal Dialysis


GI Medical History: Reports: Hx Gastroesophageal Reflux Disease.  Denies: Hx 

Cirrhosis, Hx Ulcer


Musculoskeletal Medical History: Reports Hx Arthritis, Denies Hx Multiple 

Sclerosis


Psychiatric Medical History: 


   Denies: Hx Bipolar Disorder, Hx Depression, Hx Schizophrenia


Past Surgical History: Reports: Hx Cardiac Catheterization, Hx Cholecystectomy, 

Hx Coronary Stent, Hx Vascular Surgery - Abdominal aortic aneurysm and 

bilateral femoropopliteal graft





- Immunizations


Hx Pneumococcal Vaccination: 01/01/15





Review of Systems





- Review of Systems


Notes: 





My Normal Review Basic





REVIEW OF SYSTEMS:


CONSTITUTIONAL :  Denies fever,  chills, or sweats.  Denies recent illness.


Cardiac: No chest pain.


RESPIRATORY:  Denies cough, cold, or chest congestion.  Denies shortness of 

breath, difficulty breathing, or wheezing.


GASTROINTESTINAL:  Denies abdominal pain.  Denies nausea, vomiting, or 

diarrhea. 


GENITOURINARY:  Denies difficulty urinating, painful urination, burning, 

frequency, or blood in urine.


MUSCULOSKELETAL: Back pain


SKIN:   Denies rash or skin lesions.


NEUROLOGICAL:  Denies altered mental status or loss of consciousness.  Denies 

headache.  Denies weakness or paralysis or loss of use of either side.  Denies 

problems with gait or speech.  Denies sensory or motor loss.





ALL OTHER SYSTEMS REVIEWED AND NEGATIVE.





Physical Exam





- Vital signs


Vitals: 


 











Temp Pulse Resp BP Pulse Ox


 


 97.4 F   69   16   135/96 H  99 


 


 12/14/18 23:22  12/14/18 23:22  12/14/18 23:22  12/14/18 23:22  12/14/18 23:22














- Notes


Notes: 





General Appearance: Well nourished, alert, cooperative, no acute distress, no 

obvious discomfort.


Vitals: reviewed, See vital signs table.


Head: no swelling or tenderness to the head


Eyes: PERRL, EOMI, Conjuctiva clear


Back: Patient has some reproducible pain to palpation over the left side of the 

mid thoracic spine.  It is worse when he gets up and tries to move or twist.  

There is no rash.  No evidence of shingles.  No pain around to the chest on 

palpation.  Mild pain to the left lateral rib area along the 5 to T6 dermatome.


Extremities: strength 5/5 in all extremities, good pulses in all extremities, 

no swelling or tenderness in the extremities, no edema.


Skin: warm, dry, appropriate color, no rash


Neuro: speech clear, oriented x 3, normal affect, responds appropriately to 

questions.





Course





- Re-evaluation


Re-evalutation: 





12/15/18 04:51


Patient has pain that radiates along the T5-T6 dermatome from his mid back.  I 

suspect he probably has a pinched nerve coming from the T5-T6 area.  This would 

make sense in conjunction with his known compression fractures along his back 

there.  I did give him a small dose of hydrocodone and Lyrica which helped his 

pain significantly.  I will place him on Lyrica encouraged and follow-up with 

his doctor on Monday for close reevaluation.  I do not suspect an aortic issue 

as he has no abdominal pain and no chest pain.  Also his pain is easily 

reproducible with any range of motion that involves his mid back.  Patient to 

return to ER if he has leg pain or leg weakness, chest pain, abdominal pain, 

loss of bowel control, or if he feels that his pain is intractable.  Patient 

agrees with plan and will be discharged home.





Dictation of this chart was performed using voice recognition software; 

therefore, there may be some unintended grammatical errors.





- Vital Signs


Vital signs: 


 











Temp Pulse Resp BP Pulse Ox


 


 97.4 F   69   16   135/96 H  99 


 


 12/14/18 23:22  12/14/18 23:22  12/14/18 23:22  12/14/18 23:22  12/14/18 23:22














Discharge





- Discharge


Clinical Impression: 


Back pain


Qualifiers:


 Back pain location: thoracic back pain Chronicity: unspecified Back pain 

laterality: left Qualified Code(s): M54.6 - Pain in thoracic spine





Condition: Good


Disposition: HOME, SELF-CARE


Additional Instructions: 


Based on your recent back x-ray and your pain I suspect that you probably have 

a nerve being compressed near your T5 and/or T6 thoracic vertebrae.  I have 

started you on a medication called Lyrica which helps with nerve type pain.  We 

started out at a very low dose and then your doctor can increase it as needed.  

I have also given you a bottle of Norco.  Please be aware that Norco does have 

Tylenol (acetaminophen) in it.  Please make sure you do not take more than 4000 

mg of acetaminophen a day.  Do not drive or care for children after you have 

taken this medication they will make you sleepy and sometimes impair judgment.  

Please reserve the Norco for severe pain at night when you try to get some 

rest.  Do not take this regularly as they can cause dependence and make your 

pain more difficult to treat in the future.  Please return to the ER 

immediately if you have leg weakness or numbness that is worsening, loss of 

control of your bowel function, inability to urinate, or if you feel that you 

are worsening.  Follow-up with your doctor on Tuesday as scheduled. Please talk 

to your doctor about referral to a pain specialist or back specialist to 

discuss further treatment options such as back injections or physical therapy.


Prescriptions: 


Pregabalin [Lyrica 25 Mg Capsule] 25 mg PO TID #21 capsule


Referrals: 


KATELIN PICHARDO DO [Primary Care Provider] - 12/18/18

## 2018-12-17 ENCOUNTER — HOSPITAL ENCOUNTER (EMERGENCY)
Dept: HOSPITAL 62 - ER | Age: 73
Discharge: HOME | End: 2018-12-17
Payer: MEDICARE

## 2018-12-17 VITALS — DIASTOLIC BLOOD PRESSURE: 79 MMHG | SYSTOLIC BLOOD PRESSURE: 133 MMHG

## 2018-12-17 DIAGNOSIS — K56.41: Primary | ICD-10-CM

## 2018-12-17 DIAGNOSIS — M54.6: ICD-10-CM

## 2018-12-17 DIAGNOSIS — G89.29: ICD-10-CM

## 2018-12-17 PROCEDURE — 99283 EMERGENCY DEPT VISIT LOW MDM: CPT

## 2018-12-17 PROCEDURE — 74018 RADEX ABDOMEN 1 VIEW: CPT

## 2018-12-17 NOTE — ER DOCUMENT REPORT
ED General





- General


Chief Complaint: Constipation


Stated Complaint: CONSTIPATION


Time Seen by Provider: 12/17/18 06:45


Notes: 





Patient is a 73-year-old male that presents to the emergency department for 

chief complaint of constipation.  Patient reports been taking Vicodin for 

compression fracture that he sustained last week, and because of that he has 

become rather constipated, he did have a small bowel movement several days ago, 

but it was very hard at that time, he has been taking stool softener but forgot 

to take them.  He states it is too hard the past, he has required manual 

disimpaction in the past.  He denies having any abdominal pain.  But does admit 

to having some back pain from his injury, which he currently rates as a 6 out 

of 10, describes the pain as a constant aching sensation.  He is currently out 

of the Vicodin.  He denies any numbness, weakness or tingling in his lower 

extremities, he is able to walk, denies any bowel incontinence, or urinary 

retention.  He has not been able to see his primary care physician, or anybody 

else to evaluate him since his injury that occurred over about a week ago, was 

seen on Friday.  





Past Medical History: Hypertension, CHF, COPD, depression, anxiety


Past Surgical History: Cholecystectomy, PCI with stenting, AAA repair


Social History: Former smoker, no current alcohol or drug use


Family History: Reviewed and noncontributory for presenting illness


Allergies: Reviewed, see documented allergy list. 





REVIEW OF SYSTEMS:


Other than noted above, the 12 point review of systems was reviewed with the 

patient and were negative, all pertinent findings are included in the HPI.





PHYSICAL EXAMINATION:





Vital signs reviewed, nursing noted reviewed. 





GENERAL: Elderly male, no acute distress





HEAD: Atraumatic, normocephalic.





EYES: Eyes appear normal, extraocular movements intact, sclera anicteric, 

conjunctiva are normal.





ENT: nares patent, oropharynx clear without exudates.  Moist mucous membranes.





NECK: Normal range of motion, supple without lymphadenopathy





LUNGS: Breath sounds clear to auscultation bilaterally and equal.  No wheezes 

rales or rhonchi.





HEART: Regular rate and rhythm without murmurs





ABDOMEN: Soft, nontender, normoactive bowel sounds.  No rebound, guarding, or 

rigidity. No masses appreciated.





EXTREMITIES: Nontender, good range of motion, no pitting or edema.  





NEUROLOGICAL: No focal neurological deficits. Moves all extremities 

spontaneously Motor and sensory grossly intact on exam.





PSYCH: Normal mood, normal affect.





SKIN: Warm, Dry, normal turgor, no rashes or lesions noted on exposed skin





TRAVEL OUTSIDE OF THE [place] IN LAST 30 DAYS: No





- Related Data


Allergies/Adverse Reactions: 


 





No Known Allergies Allergy (Verified 09/22/18 12:38)


 











Past Medical History





- Social History


Smoking Status: Former Smoker


Family History: CAD, COPD, Hypertension





- Past Medical History


Cardiac Medical History: Reports: Hx Heart Attack


   Denies: Hx Congestive Heart Failure, Hx Hypertension


Pulmonary Medical History: Reports: Hx Bronchitis, Hx COPD, Hx Pneumonia


   Denies: Hx Asthma, Hx Tuberculosis


Neurological Medical History: Denies: Hx Seizures


Renal/ Medical History: Reports: Hx Kidney Stones.  Denies: Hx Benign 

Prostatic Hyperplasia, Hx End Stage Renal Disease, Hx Peritoneal Dialysis


GI Medical History: Reports: Hx Gastroesophageal Reflux Disease.  Denies: Hx 

Cirrhosis, Hx Ulcer


Musculoskeletal Medical History: Reports Hx Arthritis, Denies Hx Multiple 

Sclerosis


Psychiatric Medical History: 


   Denies: Hx Bipolar Disorder, Hx Depression, Hx Schizophrenia


Past Surgical History: Reports: Hx Cardiac Catheterization, Hx Cholecystectomy, 

Hx Coronary Stent, Hx Vascular Surgery - Abdominal aortic aneurysm and 

bilateral femoropopliteal graft





- Immunizations


Hx Pneumococcal Vaccination: 01/01/15





Physical Exam





- Vital signs


Vitals: 


 











Temp Pulse Resp BP Pulse Ox


 


 97 F L  66   24 H  133/79 H  95 


 


 12/17/18 06:35  12/17/18 06:35  12/17/18 06:35  12/17/18 06:35  12/17/18 06:35














Course





- Re-evaluation


Re-evalutation: 


Patient seen and examined, vital signs reviewed, patient was in no acute 

distress on exam, I did obtain KUB, which demonstrated significant stool burden

, and likely fecal impaction, we did attempt a soapsuds and mineral oil enema, 

without any results, therefore rectal disimpaction, was performed.  At this 

point significant amount of stool was removed from the patient's rectal vault, 

and now will recommend initiating MiraLAX, and seeing gastroenterologist, 

patient is on blood thinners for his atrial fibrillation, and any further 

disimpaction at this time a cause further damage, and more bleeding.  Family 

and patient agreeable with this plan of care, they also given referral to 

orthopedic surgeons to help the patient manage his compression fracture that he 

sustained last week.  I recommended Tylenol going forward for pain management, 

as further treatment with opiates would only worsen the patient's ongoing 

constipation.





- Vital Signs


Vital signs: 


 











Temp Pulse Resp BP Pulse Ox


 


 97 F L  66   24 H  133/79 H  95 


 


 12/17/18 06:35  12/17/18 06:35  12/17/18 06:35  12/17/18 06:35  12/17/18 06:35














Discharge





- Discharge


Clinical Impression: 


 Fecal impaction





Back pain


Qualifiers:


 Back pain location: thoracic back pain Chronicity: chronic Back pain laterality

: midline Qualified Code(s): M54.6 - Pain in thoracic spine





Condition: Stable


Disposition: HOME, SELF-CARE


Additional Instructions: 


Please return to the emergency department if you have any worsening, or concern 

of your symptoms.





Please return to the emergency department if you develop chest pain, difficulty 

breathing, severe abdominal pain, or ongoing vomiting.





Please follow-up with your primary care physician in 2-3 days and any other 

recommended physicians.





If prescribed, take all medications as directed.  





If you have any questions or concerns do not hesitate to return the emergency 

department for evaluation. 





Please take the MiraLAX at least once daily, please follow-up with 

gastroenterology, and orthopedic surgery for your back.  At this point I would 

advise taking 1000 mg of acetaminophen/Tylenol 3 times daily for your back pain

, to avoid further constipation, which would only exacerbate your back pain 

further while trying to strain.


Prescriptions: 


Polyethylene Glycol 3350 [Miralax] 17 gm PO DAILY #238 powder


Referrals: 


KATELIN PICHARDO DO [Primary Care Provider] - Follow up as needed


JENELLE DALY MD [ACTIVE STAFF] - Follow up in 3-5 days (orthopedics )

## 2018-12-17 NOTE — RADIOLOGY REPORT (SQ)
EXAM DESCRIPTION: 



X-ray abdomen 1 view



CLINICAL DATA:



73-year-old male with constipation.



TECHNICAL DATA: 



A single AP supine x-ray of the abdomen was performed.



Comparison:  None.



FINDINGS:



The bowel gas pattern is nonspecific and nonobstructive.   There

is mild to moderate fecal residue scattered throughout the colon

and rectum. Multiple surgical clips are present scattered

throughout the abdomen and pelvis. A metallic vascular stent

projects over the region of the right femoral artery. There are

surgical clips in the right upper quadrant. Calcifications

project over the left hemiabdomen which could be renal in origin

or vascular.



No abnormal air collections are identified.



No focal soft tissue abnormalities are seen.



No acute osseous abnormalities are identified.



IMPRESSION:



1. Nonspecific and nonobstructive bowel gas pattern. There is

mild to moderate fecal residue scattered throughout the colon and

rectum.

2. Other chronic findings as described above.

## 2018-12-18 ENCOUNTER — HOSPITAL ENCOUNTER (OUTPATIENT)
Dept: HOSPITAL 62 - RAD | Age: 73
End: 2018-12-18
Attending: FAMILY MEDICINE
Payer: MEDICARE

## 2018-12-18 DIAGNOSIS — T14.90XA: ICD-10-CM

## 2018-12-18 DIAGNOSIS — S22.000A: Primary | ICD-10-CM

## 2018-12-18 PROCEDURE — 72146 MRI CHEST SPINE W/O DYE: CPT

## 2018-12-18 NOTE — RADIOLOGY REPORT (SQ)
EXAM DESCRIPTION:  MRI THORACIC SPINE WITHOUT



COMPLETED DATE/TIME:  12/18/2018 3:48 pm



REASON FOR STUDY:  S22.000A WEDGE COMPRESSION FRACTURE OF UNSPECIFIED THORACIC VERTEBRA, INITI S22.00
0A  WEDGE COMPRESSION FRACTURE OF UNSP THORACIC VERTEBR



COMPARISON:  Thoracic spine plain films 12/12/2018

CT chest 9/22/2018



TECHNIQUE:  Sagittal and Axial imaging includes T1, T2, STIR and gradient echo sequences.



LIMITATIONS:  None.



FINDINGS:  LOCALIZER: No worrisome findings.

ALIGNMENT: Normal.

VERTEBRAE AND BONE MARROW:  Since the prior studies, patient has developed a 25 to 50% T9 compression
 deformity, with concave inferior vertebral body endplates, and diffuse marrow edema throughout the T
9 vertebral body.  No significant retropulsion of bony fragments.  No significant central stenosis.

Chronic appearing greater than 50% T6 compression deformity, 25 to 50% T7 compression deformity, grea
ter than 50% T12 compression deformity.

HARDWARE: None in the spine.

CORD: Normal in size and signal intensity.

SOFT TISSUES: No soft tissue masses.

THORACIC DISCS T1-T12: No significant spinal stenosis or exit foraminal stenosis.

OTHER: No other significant finding.



IMPRESSION:  Acute or subacute T9 compression deformity with 25 to 50% loss of vertebral body height.
  No significant dorsal retropulsion of bony fragments.



TECHNICAL DOCUMENTATION:  JOB ID:  [id number]

 2011 [email or web address]- All Rights Reserved



Reading location - IP/workstation name: [facility]-[email or web address]

## 2018-12-20 ENCOUNTER — HOSPITAL ENCOUNTER (INPATIENT)
Dept: HOSPITAL 62 - ER | Age: 73
LOS: 9 days | Discharge: HOME | DRG: 515 | End: 2018-12-29
Attending: INTERNAL MEDICINE | Admitting: INTERNAL MEDICINE
Payer: MEDICARE

## 2018-12-20 DIAGNOSIS — K56.41: ICD-10-CM

## 2018-12-20 DIAGNOSIS — Z79.899: ICD-10-CM

## 2018-12-20 DIAGNOSIS — J02.9: ICD-10-CM

## 2018-12-20 DIAGNOSIS — N40.0: ICD-10-CM

## 2018-12-20 DIAGNOSIS — R41.82: ICD-10-CM

## 2018-12-20 DIAGNOSIS — J44.9: ICD-10-CM

## 2018-12-20 DIAGNOSIS — Z79.1: ICD-10-CM

## 2018-12-20 DIAGNOSIS — Z79.01: ICD-10-CM

## 2018-12-20 DIAGNOSIS — I25.2: ICD-10-CM

## 2018-12-20 DIAGNOSIS — I25.10: ICD-10-CM

## 2018-12-20 DIAGNOSIS — G93.41: ICD-10-CM

## 2018-12-20 DIAGNOSIS — Z79.82: ICD-10-CM

## 2018-12-20 DIAGNOSIS — E87.5: ICD-10-CM

## 2018-12-20 DIAGNOSIS — M80.88XA: Primary | ICD-10-CM

## 2018-12-20 DIAGNOSIS — Z79.52: ICD-10-CM

## 2018-12-20 LAB
ABSOLUTE LYMPHOCYTES# (MANUAL): 0.1 10^3/UL (ref 0.5–4.7)
ABSOLUTE MONOCYTES # (MANUAL): 0.3 10^3/UL (ref 0.1–1.4)
ABSOLUTE NEUTROPHILS# (MANUAL): 14 10^3/UL (ref 1.7–8.2)
ADD MANUAL DIFF: YES
ALBUMIN SERPL-MCNC: 4.2 G/DL (ref 3.5–5)
ALP SERPL-CCNC: 93 U/L (ref 38–126)
ALT SERPL-CCNC: 22 U/L (ref 21–72)
ANION GAP SERPL CALC-SCNC: 14 MMOL/L (ref 5–19)
ANISOCYTOSIS BLD QL SMEAR: (no result)
APPEARANCE UR: CLEAR
APTT PPP: YELLOW S
AST SERPL-CCNC: 30 U/L (ref 17–59)
BARBITURATES UR QL SCN: NEGATIVE
BASOPHILS NFR BLD MANUAL: 1 % (ref 0–2)
BILIRUB DIRECT SERPL-MCNC: 0.7 MG/DL (ref 0–0.4)
BILIRUB SERPL-MCNC: 1.5 MG/DL (ref 0.2–1.3)
BILIRUB UR QL STRIP: NEGATIVE
BUN SERPL-MCNC: 47 MG/DL (ref 7–20)
CALCIUM: 10.3 MG/DL (ref 8.4–10.2)
CHLORIDE SERPL-SCNC: 109 MMOL/L (ref 98–107)
CO2 SERPL-SCNC: 23 MMOL/L (ref 22–30)
EOSINOPHIL NFR BLD MANUAL: 0 % (ref 0–6)
ERYTHROCYTE [DISTWIDTH] IN BLOOD BY AUTOMATED COUNT: 18.1 % (ref 11.5–14)
GLUCOSE SERPL-MCNC: 148 MG/DL (ref 75–110)
GLUCOSE UR STRIP-MCNC: NEGATIVE MG/DL
HCT VFR BLD CALC: 43.8 % (ref 37.9–51)
HGB BLD-MCNC: 14.6 G/DL (ref 13.5–17)
KETONES UR STRIP-MCNC: 20 MG/DL
MCH RBC QN AUTO: 32.7 PG (ref 27–33.4)
MCHC RBC AUTO-ENTMCNC: 33.3 G/DL (ref 32–36)
MCV RBC AUTO: 98 FL (ref 80–97)
METHADONE UR QL SCN: NEGATIVE
MONOCYTES % (MANUAL): 2 % (ref 3–13)
NEUTS BAND NFR BLD MANUAL: 1 % (ref 3–5)
NITRITE UR QL STRIP: NEGATIVE
OVALOCYTES BLD QL SMEAR: SLIGHT
PCP UR QL SCN: NEGATIVE
PH UR STRIP: 5 [PH] (ref 5–9)
PLATELET # BLD: 201 10^3/UL (ref 150–450)
PLATELET COMMENT: ADEQUATE
POIKILOCYTOSIS BLD QL SMEAR: SLIGHT
POTASSIUM SERPL-SCNC: 5.4 MMOL/L (ref 3.6–5)
PROT SERPL-MCNC: 7.4 G/DL (ref 6.3–8.2)
PROT UR STRIP-MCNC: 30 MG/DL
RBC # BLD AUTO: 4.45 10^6/UL (ref 4.35–5.55)
SEGMENTED NEUTROPHILS % (MAN): 95 % (ref 42–78)
SODIUM SERPL-SCNC: 146 MMOL/L (ref 137–145)
SP GR UR STRIP: 1.02
TOTAL CELLS COUNTED BLD: 100
TOXIC GRANULES BLD QL SMEAR: (no result)
URINE AMPHETAMINES SCREEN: NEGATIVE
URINE BENZODIAZEPINES SCREEN: NEGATIVE
URINE COCAINE SCREEN: NEGATIVE
URINE MARIJUANA (THC) SCREEN: NEGATIVE
UROBILINOGEN UR-MCNC: NEGATIVE MG/DL (ref ?–2)
VARIANT LYMPHS NFR BLD MANUAL: 1 % (ref 13–45)
WBC # BLD AUTO: 14.6 10^3/UL (ref 4–10.5)

## 2018-12-20 PROCEDURE — 01936: CPT

## 2018-12-20 PROCEDURE — 70450 CT HEAD/BRAIN W/O DYE: CPT

## 2018-12-20 PROCEDURE — 72146 MRI CHEST SPINE W/O DYE: CPT

## 2018-12-20 PROCEDURE — 71045 X-RAY EXAM CHEST 1 VIEW: CPT

## 2018-12-20 PROCEDURE — 72070 X-RAY EXAM THORAC SPINE 2VWS: CPT

## 2018-12-20 PROCEDURE — 80048 BASIC METABOLIC PNL TOTAL CA: CPT

## 2018-12-20 PROCEDURE — 83735 ASSAY OF MAGNESIUM: CPT

## 2018-12-20 PROCEDURE — 80307 DRUG TEST PRSMV CHEM ANLYZR: CPT

## 2018-12-20 PROCEDURE — 51702 INSERT TEMP BLADDER CATH: CPT

## 2018-12-20 PROCEDURE — 96360 HYDRATION IV INFUSION INIT: CPT

## 2018-12-20 PROCEDURE — C1713 ANCHOR/SCREW BN/BN,TIS/BN: HCPCS

## 2018-12-20 PROCEDURE — 74018 RADEX ABDOMEN 1 VIEW: CPT

## 2018-12-20 PROCEDURE — 82140 ASSAY OF AMMONIA: CPT

## 2018-12-20 PROCEDURE — 81001 URINALYSIS AUTO W/SCOPE: CPT

## 2018-12-20 PROCEDURE — 85610 PROTHROMBIN TIME: CPT

## 2018-12-20 PROCEDURE — 99285 EMERGENCY DEPT VISIT HI MDM: CPT

## 2018-12-20 PROCEDURE — 94640 AIRWAY INHALATION TREATMENT: CPT

## 2018-12-20 PROCEDURE — 93005 ELECTROCARDIOGRAM TRACING: CPT

## 2018-12-20 PROCEDURE — 85730 THROMBOPLASTIN TIME PARTIAL: CPT

## 2018-12-20 PROCEDURE — 85025 COMPLETE CBC W/AUTO DIFF WBC: CPT

## 2018-12-20 PROCEDURE — 36415 COLL VENOUS BLD VENIPUNCTURE: CPT

## 2018-12-20 PROCEDURE — 85027 COMPLETE CBC AUTOMATED: CPT

## 2018-12-20 PROCEDURE — 80053 COMPREHEN METABOLIC PANEL: CPT

## 2018-12-20 PROCEDURE — 93010 ELECTROCARDIOGRAM REPORT: CPT

## 2018-12-20 RX ADMIN — ASPIRIN SCH MG: 81 TABLET, COATED ORAL at 12:01

## 2018-12-20 RX ADMIN — HEPARIN SODIUM SCH UNIT: 5000 INJECTION, SOLUTION INTRAVENOUS; SUBCUTANEOUS at 05:42

## 2018-12-20 RX ADMIN — MAGNESIUM HYDROXIDE SCH ML: 400 SUSPENSION ORAL at 11:50

## 2018-12-20 RX ADMIN — CLOPIDOGREL BISULFATE SCH MG: 75 TABLET, FILM COATED ORAL at 11:50

## 2018-12-20 RX ADMIN — DOCUSATE SODIUM SCH MG: 100 CAPSULE, LIQUID FILLED ORAL at 18:25

## 2018-12-20 RX ADMIN — DOCUSATE SODIUM SCH MG: 100 CAPSULE, LIQUID FILLED ORAL at 11:50

## 2018-12-20 RX ADMIN — ACETAMINOPHEN PRN MG: 325 TABLET ORAL at 19:55

## 2018-12-20 RX ADMIN — FOLIC ACID SCH MG: 1 TABLET ORAL at 11:50

## 2018-12-20 RX ADMIN — ACETAMINOPHEN PRN MG: 325 TABLET ORAL at 05:42

## 2018-12-20 RX ADMIN — MAGNESIUM OXIDE TAB 400 MG (241.3 MG ELEMENTAL MG) SCH MG: 400 (241.3 MG) TAB at 12:01

## 2018-12-20 RX ADMIN — ATENOLOL SCH MG: 50 TABLET ORAL at 12:04

## 2018-12-20 RX ADMIN — TAMSULOSIN HYDROCHLORIDE SCH MG: 0.4 CAPSULE ORAL at 11:50

## 2018-12-20 RX ADMIN — HEPARIN SODIUM SCH UNIT: 5000 INJECTION, SOLUTION INTRAVENOUS; SUBCUTANEOUS at 22:28

## 2018-12-20 RX ADMIN — HEPARIN SODIUM SCH: 5000 INJECTION, SOLUTION INTRAVENOUS; SUBCUTANEOUS at 16:15

## 2018-12-20 RX ADMIN — FINASTERIDE SCH MG: 5 TABLET, FILM COATED ORAL at 12:01

## 2018-12-20 NOTE — EKG REPORT
SEVERITY:- OTHERWISE NORMAL ECG -

SINUS RHYTHM

LEFT AXIS DEVIATION

:

Confirmed by: Tom Wei MD 20-Dec-2018 07:51:43

## 2018-12-20 NOTE — PDOC PROGRESS REPORT
Subjective


Progress Note for:: 12/20/18


Subjective:: 





The patient is moaning.  He does open his eyes but responds to verbal stimulus 

intermittently.  He does state that his toes hurt.


Reason For Visit: 


INTRACTABLE BACK PAIN, FECAL IMPACTION,








Physical Exam


Vital Signs: 


                                        











Temp Pulse Resp BP Pulse Ox


 


 97.3 F   79   22 H  124/66   99 


 


 12/20/18 16:28  12/20/18 16:28  12/20/18 15:59  12/20/18 16:28  12/20/18 15:59








                                 Intake & Output











 12/19/18 12/20/18 12/21/18





 06:59 06:59 06:59


 


Intake Total  500 


 


Output Total   550


 


Balance  500 -550


 


Weight  74.843 kg 77.7 kg











General appearance: PRESENT: mild distress, well-developed


Mouth exam: PRESENT: dry mucosa


Respiratory exam: PRESENT: clear to auscultation hellen, symmetrical, unlabored.  

ABSENT: accessory muscle use, prolonged expiratory phas, rales, rhonchi, wheezes


Cardiovascular exam: PRESENT: RRR, +S1, +S2


GI/Abdominal exam: PRESENT: normal bowel sounds, soft.  ABSENT: tenderness


Extremities exam: PRESENT: tenderness - Toes.  No discoloration or erythema.  

They are not cold to touch.  Pain response is inconsistent., other


Musculoskeletal exam: PRESENT: other - Clamshell brace in place


Neurological exam: PRESENT: altered, awake, oriented to person, CN II-XII 

grossly intact.  ABSENT: oriented to place, oriented to time, oriented to 

situation


Psychiatric exam: ABSENT: agitated, anxious


Focused psych exam: PRESENT: restlessness - Slightly restless in bed





Results


Laboratory Results: 


                                        





                                 12/20/18 01:50 





                                 12/20/18 01:50 





                                        











  12/20/18 12/20/18 12/20/18





  01:50 01:50 02:07


 


WBC  14.6 H  


 


RBC  4.45  


 


Hgb  14.6  


 


Hct  43.8  


 


MCV  98 H  


 


MCH  32.7  


 


MCHC  33.3  


 


RDW  18.1 H  


 


Plt Count  201  


 


Seg Neutrophils %  Not Reportable  


 


Lymphocytes %  Not Reportable  


 


Monocytes %  Not Reportable  


 


Eosinophils %  Not Reportable  


 


Basophils %  Not Reportable  


 


Absolute Neutrophils  Not Reportable  


 


Absolute Lymphocytes  Not Reportable  


 


Absolute Monocytes  Not Reportable  


 


Absolute Eosinophils  Not Reportable  


 


Absolute Basophils  Not Reportable  


 


Sodium   146.0 H 


 


Potassium   5.4 H 


 


Chloride   109 H 


 


Carbon Dioxide   23 


 


Anion Gap   14 


 


BUN   47 H 


 


Creatinine   1.32 H 


 


Est GFR ( Amer)   > 60 


 


Est GFR (Non-Af Amer)   53 L 


 


Glucose   148 H 


 


Calcium   10.3 H 


 


Total Bilirubin   1.5 H 


 


AST   30 


 


ALT   22 


 


Alkaline Phosphatase   93 


 


Total Protein   7.4 


 


Albumin   4.2 


 


Urine Color    YELLOW


 


Urine Appearance    CLEAR


 


Urine pH    5.0


 


Ur Specific Gravity    1.021


 


Urine Protein    30 H


 


Urine Glucose (UA)    NEGATIVE


 


Urine Ketones    20 H


 


Urine Blood    SMALL H


 


Urine Nitrite    NEGATIVE


 


Ur Leukocyte Esterase    NEGATIVE


 


Urine WBC (Auto)    1


 


Urine RBC (Auto)    5











Impressions: 


                                        





Chest X-Ray  12/20/18 01:41


IMPRESSION: 


 


No significant change.


 








Head CT  12/20/18 01:41


IMPRESSION:


 


No acute intracranial abnormality.


Acute sinusitis.


 


TECHNICAL DOCUMENTATION:


 


Quality ID # 436: Final reports with documentation of one or more


dose reduction techniques (e.g., Automated exposure control,


adjustment of the mA and/or kV according to patient size, use of


iterative reconstruction technique)


 


copyright 2011 [employer]- All Rights Reserved


 














Assessment & Plan





- Diagnosis


(1) Altered mental status


Qualifiers: 


   Altered mental status type: delirium   Qualified Code(s): R41.0 - 

Disorientation, unspecified   


Is this a current diagnosis for this admission?: Yes   


Plan: 


Initially this was felt to be due to medications for pain after his vertebral 

compression fracture.  He should be improving because he has not been given any 

narcotic analgesia.  He is on the dry side and high have ordered IV normal 

saline.  I will recheck his blood work tomorrow but there is no obvious 

infection or severe metabolic abnormality.








(2) Hyperkalemia


Is this a current diagnosis for this admission?: Yes   


Plan: 


The patient's potassium was elevated this morning.  He did receive lactulose.  I

will check his chemistries tomorrow.  If this is still elevated we will likely 

begin a scheduled regimen of lactulose.








(3) Fecal impaction


Is this a current diagnosis for this admission?: Yes   


Plan: 


The patient did have 3 bowel movements earlier today.  We will continue the 

Colace.  The lactulose will have helped as well.  As noted above we may have to 

have a scheduled dose of lactulose or consider MiraLAX.








- Time


Time Spent with patient: 15-24 minutes


Medications reviewed and adjusted accordingly: Yes





- Inpatient Certification


Based on my medical assessment, after consideration of the patient's comor

bidities, presenting symptoms, or acuity I expect that the services needed 

warrant INPATIENT care.: Yes


I certify that my determination is in accordance with my understanding of 

Medicare's requirements for reasonable and necessary INPATIENT services [42 CFR 

412.3e].: Yes


Medical Necessity: Need For IV Fluids, Need for Neurological Checks, Risk of 

Complication if Not Cared For in Hospital


Post Hospital Care: D/C Planner Documentation

## 2018-12-20 NOTE — ER DOCUMENT REPORT
ED General





- General


Stated Complaint: ALTERED MENTAL STATUS


Time Seen by Provider: 12/20/18 01:31


Notes: 





Patient 73-year-old male presents with complaint of altered mental status and 

back pain.  He was seen by me several days ago.  At that time I reviewed his 

previous x-rays which show that he had multiple compression fractures in his 

back.  Most of them are chronic except for one at level T5.  He was having pain 

radiating into his left shoulder blade consistent with appear to be a pinched 

nerve from his compression fractures.  I placed him on Lyrica to help with this.

 He did have some improvement while here in the ER and was discharged home.  He 

came back a few days later because he was on hydrocodone which made him 

constipated.  He saw Dr. Lake and was disimpacted and told not to take the 

opiate medications as they will make his constipation worse.  Per report from 

the paramedics he can follow-up with his primary care doctor placed on oxycodone

2 days ago.  He then followed up again today and was placed on baclofen.  After 

starting the baclofen he started becoming confused and has become more and more 

confused throughout the night and therefore was brought to the ER.  Patient is 

unable to contribute to history.


TRAVEL OUTSIDE OF THE [place] IN LAST 30 DAYS: No





- Related Data


Allergies/Adverse Reactions: 


                                        





No Known Allergies Allergy (Verified 09/22/18 12:38)


   











Past Medical History





- Social History


Smoking Status: Never Smoker


Frequency of alcohol use: None


Drug Abuse: None


Family History: CAD, COPD, Hypertension





- Past Medical History


Cardiac Medical History: Reports: Hx Heart Attack


   Denies: Hx Congestive Heart Failure, Hx Hypertension


Pulmonary Medical History: Reports: Hx Bronchitis, Hx COPD, Hx Pneumonia


   Denies: Hx Asthma, Hx Tuberculosis


Neurological Medical History: Denies: Hx Seizures


Renal/ Medical History: Reports: Hx Kidney Stones.  Denies: Hx Benign 

Prostatic Hyperplasia, Hx End Stage Renal Disease, Hx Peritoneal Dialysis


GI Medical History: Reports: Hx Gastroesophageal Reflux Disease.  Denies: Hx 

Cirrhosis, Hx Ulcer


Musculoskeletal Medical History: Reports Hx Arthritis, Denies Hx Multiple 

Sclerosis


Psychiatric Medical History: 


   Denies: Hx Bipolar Disorder, Hx Depression, Hx Schizophrenia


Past Surgical History: Reports: Hx Cardiac Catheterization, Hx Cholecystectomy, 

Hx Coronary Stent, Hx Vascular Surgery - Abdominal aortic aneurysm and bilateral

femoropopliteal graft





- Immunizations


Hx Pneumococcal Vaccination: 01/01/15





Review of Systems





- Review of Systems


-: Yes ROS unobtainable due to patient's medical condition - Patient is very 

confused and unable to answer questions.





Physical Exam





- Vital signs


Vitals: 


                                        











Resp Pulse Ox


 


 24 H  90 L


 


 12/20/18 01:26  12/20/18 01:26














- Notes


Notes: 





General Appearance:  moderate obvious discomfort, patient is keeping his eyes 

closed.  He is swelling around the bed and not making sense.  He is unable to 

answer my questions and does not respond to my questions.


Vitals: reviewed, See vital signs table.


Head: no swelling or tenderness to the head


Eyes: PERRL, EOMI, Conjuctiva clear


Mouth: Dry mucous membranes.


Throat: No tonsillar inflammation, No airway obstruction,  No lymphadenopathy


Neck: Supple, no neck tenderness, No thyromegaly


Lungs: No wheezing, No rales, No rhonci, No accessory muscle use, good air 

exchange bilaterally.


Heart: Normal rate, Regular rythm, No murmur, no rub


Abdomen: Normal BS, soft, No rigidity, No abdominal tenderness,


Extremities: Patient is moving all 4 extremities on his own while laying in the 

bed.  No significant lower extremity edema.


Skin: warm, dry, appropriate color, no rash


Neuro: Patient is moving all 4 extremities spontaneously.  He will not follow 

commands and therefore it is difficult to perform full neurologic evaluation.  

Pupils are equal and normal.  They are reactive to light.  He has symmetric 

facial movement.





Course





- Re-evaluation


Re-evalutation: 





12/20/18 01:58


Patient's son is now at bedside.  He said that the saw the spinal doctor, Dr. Cortez.  Has a plan on arranging for spinal surgery in the next 2 weeks.  He 

said they do have a clear thing with cardiologist and primary care doctor.  He 

says that the spinal Dr. MAYS placed him on baclofen he started taking it yesterday

and did fine with yesterday.  Today the added oxycodone and the son thinks it is

possible that the patient took 2 oxycodone instead of 1.  Shortly after taking 

these meds he started acting this way.  No recent fevers.  Has had some cough.  

No other concerns at this time.


12/20/18 03:04








She continues to be altered and confused.  He does have a leukocytosis but no 

fever and no other signs of infection.  I suspect his white count of 14,000 is 

probably demargination.  Warrant a CT scan of the head as well as chest x-ray 

and urinalysis.  These are all negative.  Feel patient needs admission for 

further observation until his altered mental status resolves.  I did speak with 

Dr. Keita, hospitalist, who agrees to evaluate the patient for admission.





Dictation of this chart was performed using voice recognition software; 

therefore, there may be some unintended grammatical errors.





- Vital Signs


Vital signs: 


                                        











Temp Pulse Resp BP Pulse Ox


 


 98.9 F      19   138/94 H  92 


 


 12/20/18 05:00     12/20/18 05:31  12/20/18 05:31  12/20/18 05:31














- Laboratory


Result Diagrams: 


                                 12/20/18 01:50





                                 12/20/18 01:50


Laboratory results interpreted by me: 


                                        











  12/20/18 12/20/18 12/20/18





  01:50 01:50 02:07


 


WBC  14.6 H  


 


MCV  98 H  


 


RDW  18.1 H  


 


Seg Neuts % (Manual)  95 H  


 


Band Neutrophils %  1 L  


 


Lymphocytes % (Manual)  1 L  


 


Monocytes % (Manual)  2 L  


 


Abs Neuts (Manual)  14.0 H  


 


Abs Lymphs (Manual)  0.1 L  


 


Sodium   146.0 H 


 


Potassium   5.4 H 


 


Chloride   109 H 


 


BUN   47 H 


 


Creatinine   1.32 H 


 


Est GFR (Non-Af Amer)   53 L 


 


Glucose   148 H 


 


Calcium   10.3 H 


 


Total Bilirubin   1.5 H 


 


Direct Bilirubin   0.7 H 


 


Urine Protein    30 H


 


Urine Ketones    20 H


 


Urine Blood    SMALL H














Discharge





- Discharge


Clinical Impression: 


Altered mental status


Qualifiers:


 Altered mental status type: unspecified Qualified Code(s): R41.82 - Altered 

mental status, unspecified





Condition: Stable


Disposition: ADMITTED AS OBSERVATION


Admitting Provider: Hospitalist


Unit Admitted: Telemetry

## 2018-12-20 NOTE — EKG REPORT
SEVERITY:- OTHERWISE NORMAL ECG -

SINUS RHYTHM

BORDERLINE LEFT AXIS DEVIATION

:

Confirmed by: Tom Wei MD 20-Dec-2018 07:52:06

## 2018-12-20 NOTE — RADIOLOGY REPORT (SQ)
CLINICAL HISTORY:  altered mental status 



COMPARISON: December 12, 2018.



TECHNIQUE: XR CHEST 1 VIEW 12/20/2018 1:41 AM CST



FINDINGS: 



Cardiac silhouette is normal in size. There is minimal vague

right lower lung airspace disease. There is biapical scarring.

There is no pleural effusion. There is no pneumothorax. There are

no acute osseous findings.



IMPRESSION: 



No significant change.

## 2018-12-20 NOTE — RADIOLOGY REPORT (SQ)
EXAM DESCRIPTION: 



CT HEAD WITHOUT IV CONTRAST



COMPLETED DATE/TME:  12/20/2018 01:41



CLINICAL HISTORY: 



73 years, Male, altered mental status



COMPARISON:

None.



TECHNIQUE:

Axial CT images of the brain were obtained without contrast. DLP

[id number]  Images stored on PACS.

 

All CT scanners at this facility use dose modulation, iterative

reconstruction, and/or weight based dosing when appropriate to

reduce radiation dose to as low as reasonably achievable (ALARA).





[facility]: Dose Right [facility]: CareDose   [facility]: Dose Right    [facility]:

Teradose 4D    [facility]: Smart Technologies



LIMITATIONS:

None.



FINDINGS:



There is no acute infarct, hemorrhage, mass, edema, hydrocephalus

or extra-axial fluid collection.  There is diffuse cerebral

atrophy.  Air-fluid levels are in the right maxillary and left

sphenoid sinuses.  There is no acute fracture. 





IMPRESSION:



No acute intracranial abnormality.

Acute sinusitis.

 

TECHNICAL DOCUMENTATION:



Quality ID # 436: Final reports with documentation of one or more

dose reduction techniques (e.g., Automated exposure control,

adjustment of the mA and/or kV according to patient size, use of

iterative reconstruction technique)



copyright 2011 [employer]- All Rights Reserved

## 2018-12-20 NOTE — PDOC H&P
History of Present Illness


Admission Date/PCP: 


  12/20/18 03:12





  RONY ARMENTA DO





Patient complains of: Altered mental status


History of Present Illness: 


GEOVANNI ISRAEL is a 73 year old male with a past medical history of COPD, coronary

artery disease on Plavix, BPH and  acute T5 compression fracture


With intractable pain 5 days ago.  He was placed on Lyrica and oxycodone.  He 

developed abdominal pain with fecal impaction prompting emergency room 

evaluation where he was partially manually disimpacted, discharged with bowel 

regiment and recommendation of avoiding narcotics.  With intractable back pain 

he saw orthopedic surgeon Dr peralta placed on baclofen and oxycodone 2 days ago.

 Today he is brought in by his son with confusion and constipation.  In the [facility]

rgency room he is found to have leukocytosis, hyperkalemia, altered mental 

status and a left lower quadrant pain without guarding.  He is referred to the 

hospitalist for admission.





Past Medical History


Cardiac Medical History: Reports: Myocardial Infarction


   Denies: Congestive Heart Failure, Hypertension


Pulmonary Medical History: Reports: Bronchitis, Chronic Obstructive Pulmonary 

Disease (COPD), Pneumonia


   Denies: Asthma, Tuberculosis


Neurological Medical History: 


   Denies: Seizures


Renal/ Medical History: 


   Denies: End Stage Renal Disease


GI Medical History: Reports: Gastroesophageal Reflux Disease


   Denies: Cirrhosis


Musculoskeltal Medical History: Reports: Arthritis


Psychiatric Medical History: 


   Denies: Bipolar Disorder, Depression


Hematology: 


   Denies: Anemia, Bleeding Tendencies





Past Surgical History


Past Surgical History: Reports: Cardiac Catheterization, Cholecystectomy, 

Coronary Stent, Vascular Surgery - Abdominal aortic aneurysm and bilateral 

femoropopliteal graft





Social History


Information Source: Relative, Emergency Med Personnel


Smoking Status: Never Smoker


Frequency of Alcohol Use: None


Drugs: None





- Advance Directive


Resuscitation Status: Full Code





Family History


Family History: CAD, COPD, Hypertension


Parental Family History Reviewed: Yes


Children Family History Reviewed: Yes


Sibling(s) Family History Reviewed.: Yes





Medication/Allergy


Home Medications: 








Aspirin [Ecotrin] 81 mg PO DAILY 09/28/17 


Atenolol [Tenormin 50 mg Tablet] 50 mg PO DAILY 09/28/17 


Clopidogrel Bisulfate [Plavix 75 mg Tablet] 75 mg PO DAILY 09/28/17 


Finasteride [Proscar 5 mg Tablet] 5 mg PO DAILY 09/28/17 


Folic Acid 0.4 mg PO DAILY 09/28/17 


Ibuprofen [Motrin 800 mg Tablet] 800 mg PO Q12 09/28/17 


Magnesium 250 mg PO DAILY 09/28/17 


Methotrexate Sodium [Methotrexate] 4 tab PO BID 09/28/17 


Multivitamin [Multivitamins] 1 cap PO DAILY 09/28/17 


Omeprazole 40 mg PO DAILY 09/28/17 


Potassium 200mg Otc 200 mg PO DAILY 09/28/17 


Prednisone [Deltasone 5 mg Tablet] 5 mg PO DAILY 09/28/17 


Simvastatin [Zocor 40 mg Tablet] 40 mg PO QHS 09/28/17 


Tamsulosin HCl [Flomax 0.4 mg Cap.sr] 0.4 mg PO DAILY 09/28/17 


Docusate Sodium [Colace 100 mg Capsule] 100 mg PO BID 7 Days #14 capsule 

09/22/18 


Hydrocodone/Acetaminophen [Norco 5-325 mg Tablet] 1 tab PO TID PRN 4 Days #12 

tablet 09/22/18 


Pregabalin [Lyrica 25 Mg Capsule] 25 mg PO TID #21 capsule 12/15/18 


Polyethylene Glycol 3350 [Miralax] 17 gm PO DAILY #238 powder 12/17/18 








Allergies/Adverse Reactions: 


                                        





No Known Allergies Allergy (Verified 09/22/18 12:38)


   











Review of Systems


ROS unobtainable: Due to mental status - Delirium





Physical Exam


Vital Signs: 


                                        











Temp Pulse Resp BP Pulse Ox


 


 98.5 F      19   131/85 H  96 


 


 12/20/18 04:04     12/20/18 04:31  12/20/18 04:31  12/20/18 04:31








                                 Intake & Output











 12/18/18 12/19/18 12/20/18





 11:59 11:59 11:59


 


Intake Total   500


 


Balance   500


 


Weight   74.843 kg











General appearance: PRESENT: disheveled, mild distress.  ABSENT: cooperative


Head exam: PRESENT: atraumatic, normocephalic


Eye exam: PRESENT: conjunctiva pink, EOMI, PERRLA.  ABSENT: scleral icterus


Ear exam: PRESENT: normal external ear exam


Mouth exam: PRESENT: moist, tongue midline


Neck exam: ABSENT: carotid bruit, JVD, lymphadenopathy, thyromegaly


Respiratory exam: PRESENT: clear to auscultation hellen.  ABSENT: rales, rhonchi, 

wheezes


Cardiovascular exam: PRESENT: RRR.  ABSENT: diastolic murmur, rubs, systolic 

murmur


GI/Abdominal exam: PRESENT: diminished bowel sounds, distended, hypoactive bowel

 sounds, tenderness.  ABSENT: firm, guarding, rebound, rigid


Rectal exam: PRESENT: deferred


Extremities exam: PRESENT: full ROM.  ABSENT: calf tenderness, clubbing, pedal 

edema


Neurological exam: PRESENT: altered, awake, oriented to person, CN II-XII 

grossly intact.  ABSENT: motor sensory deficit


Psychiatric exam: PRESENT: agitated, anxious, normal mood.  ABSENT: homicidal 

ideation, suicidal ideation


Skin exam: PRESENT: dry, intact, warm.  ABSENT: cyanosis, rash





Results


Laboratory Results: 


                                        





                                 12/20/18 01:50 





                                 12/20/18 01:50 





                                        











  12/20/18 12/20/18 12/20/18





  01:50 01:50 02:07


 


WBC  14.6 H  


 


RBC  4.45  


 


Hgb  14.6  


 


Hct  43.8  


 


MCV  98 H  


 


MCH  32.7  


 


MCHC  33.3  


 


RDW  18.1 H  


 


Plt Count  201  


 


Seg Neutrophils %  Not Reportable  


 


Lymphocytes %  Not Reportable  


 


Monocytes %  Not Reportable  


 


Eosinophils %  Not Reportable  


 


Basophils %  Not Reportable  


 


Absolute Neutrophils  Not Reportable  


 


Absolute Lymphocytes  Not Reportable  


 


Absolute Monocytes  Not Reportable  


 


Absolute Eosinophils  Not Reportable  


 


Absolute Basophils  Not Reportable  


 


Sodium   146.0 H 


 


Potassium   5.4 H 


 


Chloride   109 H 


 


Carbon Dioxide   23 


 


Anion Gap   14 


 


BUN   47 H 


 


Creatinine   1.32 H 


 


Est GFR ( Amer)   > 60 


 


Est GFR (Non-Af Amer)   53 L 


 


Glucose   148 H 


 


Calcium   10.3 H 


 


Total Bilirubin   1.5 H 


 


AST   30 


 


ALT   22 


 


Alkaline Phosphatase   93 


 


Total Protein   7.4 


 


Albumin   4.2 


 


Urine Color    YELLOW


 


Urine Appearance    CLEAR


 


Urine pH    5.0


 


Ur Specific Gravity    1.021


 


Urine Protein    30 H


 


Urine Glucose (UA)    NEGATIVE


 


Urine Ketones    20 H


 


Urine Blood    SMALL H


 


Urine Nitrite    NEGATIVE


 


Ur Leukocyte Esterase    NEGATIVE


 


Urine WBC (Auto)    1


 


Urine RBC (Auto)    5











Impressions: 


                                        





Chest X-Ray  12/20/18 01:41


IMPRESSION: 


 


No significant change.


 








Head CT  12/20/18 01:41


IMPRESSION:


 


No acute intracranial abnormality.


Acute sinusitis.


 


TECHNICAL DOCUMENTATION:


 


Quality ID # [id number]: Final reports with documentation of one or more


dose reduction techniques (e.g., Automated exposure control,


adjustment of the mA and/or kV according to patient size, use of


iterative reconstruction technique)


 


copyright 2011 [employer]- All Rights Reserved


 














Assessment & Plan





- Diagnosis


(1) Altered mental status


Qualifiers: 


   Altered mental status type: unspecified   Qualified Code(s): R41.82 - Altered

 mental status, unspecified   


Is this a current diagnosis for this admission?: Yes   


Plan: 


Metabolic encephalopathy secondary to baclofen and oxycodone, complicated by 

acute on chronic pain.  Hold baclofen, reduce oxycodone,








(2) Hyperkalemia


Is this a current diagnosis for this admission?: Yes   


Plan: 


Secondary to fecal impaction, follow-up chemistry








(3) Back pain


Qualifiers: 


   Back pain location: thoracic back pain   Chronicity: chronic   Back pain 

laterality: midline   Qualified Code(s): M54.6 - Pain in thoracic spine; G89.29 

- Other chronic pain; G89.29 - Other chronic pain   


Is this a current diagnosis for this admission?: Yes   


Plan: 


Pain management consult given acute T5 compression fracture, Plavix held








(4) Fecal impaction


Is this a current diagnosis for this admission?: Yes   


Plan: 


Fleet enema, lactulose, daily bowel regimen initiated.








- Time


Time Spent: 50 to 70 Minutes





- Inpatient Certification


Medical Necessity: Need Close Monitoring Due to Risk of Patient Decompensation

## 2018-12-21 LAB
ABSOLUTE LYMPHOCYTES# (MANUAL): 0.2 10^3/UL (ref 0.5–4.7)
ABSOLUTE MONOCYTES # (MANUAL): 0.3 10^3/UL (ref 0.1–1.4)
ABSOLUTE NEUTROPHILS# (MANUAL): 6 10^3/UL (ref 1.7–8.2)
ADD MANUAL DIFF: YES
ALBUMIN SERPL-MCNC: 2.8 G/DL (ref 3.5–5)
ALP SERPL-CCNC: 76 U/L (ref 38–126)
ALT SERPL-CCNC: 25 U/L (ref 21–72)
ANION GAP SERPL CALC-SCNC: 7 MMOL/L (ref 5–19)
ANISOCYTOSIS BLD QL SMEAR: (no result)
APPEARANCE UR: (no result)
APTT PPP: YELLOW S
AST SERPL-CCNC: 23 U/L (ref 17–59)
BASOPHILS NFR BLD MANUAL: 0 % (ref 0–2)
BILIRUB DIRECT SERPL-MCNC: 0.4 MG/DL (ref 0–0.4)
BILIRUB SERPL-MCNC: 1.1 MG/DL (ref 0.2–1.3)
BILIRUB UR QL STRIP: NEGATIVE
BUN SERPL-MCNC: 25 MG/DL (ref 7–20)
CALCIUM: 8.9 MG/DL (ref 8.4–10.2)
CHLORIDE SERPL-SCNC: 119 MMOL/L (ref 98–107)
CO2 SERPL-SCNC: 18 MMOL/L (ref 22–30)
EOSINOPHIL NFR BLD MANUAL: 0 % (ref 0–6)
ERYTHROCYTE [DISTWIDTH] IN BLOOD BY AUTOMATED COUNT: 17.2 % (ref 11.5–14)
GLUCOSE SERPL-MCNC: 103 MG/DL (ref 75–110)
GLUCOSE UR STRIP-MCNC: NEGATIVE MG/DL
HCT VFR BLD CALC: 35.2 % (ref 37.9–51)
HGB BLD-MCNC: 11.7 G/DL (ref 13.5–17)
KETONES UR STRIP-MCNC: NEGATIVE MG/DL
MCH RBC QN AUTO: 32.2 PG (ref 27–33.4)
MCHC RBC AUTO-ENTMCNC: 33.1 G/DL (ref 32–36)
MCV RBC AUTO: 97 FL (ref 80–97)
MONOCYTES % (MANUAL): 5 % (ref 3–13)
NITRITE UR QL STRIP: NEGATIVE
PAPPENHEIMER BODIES: PRESENT
PH UR STRIP: 6 [PH] (ref 5–9)
PLATELET # BLD: 135 10^3/UL (ref 150–450)
PLATELET COMMENT: ADEQUATE
PLATELET LARGE: PRESENT
POTASSIUM SERPL-SCNC: 3.9 MMOL/L (ref 3.6–5)
PROT SERPL-MCNC: 5.4 G/DL (ref 6.3–8.2)
PROT UR STRIP-MCNC: 30 MG/DL
RBC # BLD AUTO: 3.62 10^6/UL (ref 4.35–5.55)
SEGMENTED NEUTROPHILS % (MAN): 92 % (ref 42–78)
SODIUM SERPL-SCNC: 144.3 MMOL/L (ref 137–145)
SP GR UR STRIP: 1.02
TOTAL CELLS COUNTED BLD: 100
TOXIC GRANULES BLD QL SMEAR: (no result)
UROBILINOGEN UR-MCNC: 2 MG/DL (ref ?–2)
VARIANT LYMPHS NFR BLD MANUAL: 3 % (ref 13–45)
WBC # BLD AUTO: 6.5 10^3/UL (ref 4–10.5)

## 2018-12-21 RX ADMIN — ACETAMINOPHEN PRN MG: 325 TABLET ORAL at 10:10

## 2018-12-21 RX ADMIN — HEPARIN SODIUM SCH UNIT: 5000 INJECTION, SOLUTION INTRAVENOUS; SUBCUTANEOUS at 05:31

## 2018-12-21 RX ADMIN — FINASTERIDE SCH MG: 5 TABLET, FILM COATED ORAL at 09:18

## 2018-12-21 RX ADMIN — DOCUSATE SODIUM SCH: 100 CAPSULE, LIQUID FILLED ORAL at 17:56

## 2018-12-21 RX ADMIN — TAMSULOSIN HYDROCHLORIDE SCH MG: 0.4 CAPSULE ORAL at 09:18

## 2018-12-21 RX ADMIN — ENOXAPARIN SODIUM SCH: 80 INJECTION SUBCUTANEOUS at 21:33

## 2018-12-21 RX ADMIN — OXYCODONE HYDROCHLORIDE SCH MG: 5 TABLET ORAL at 16:55

## 2018-12-21 RX ADMIN — POLYETHYLENE GLYCOL 3350 SCH: 17 POWDER, FOR SOLUTION ORAL at 17:56

## 2018-12-21 RX ADMIN — ACETAMINOPHEN PRN MG: 325 TABLET ORAL at 04:05

## 2018-12-21 RX ADMIN — MAGNESIUM HYDROXIDE SCH ML: 400 SUSPENSION ORAL at 09:20

## 2018-12-21 RX ADMIN — OXYCODONE HYDROCHLORIDE SCH MG: 5 TABLET ORAL at 20:25

## 2018-12-21 RX ADMIN — ASPIRIN SCH MG: 81 TABLET, COATED ORAL at 09:19

## 2018-12-21 RX ADMIN — MAGNESIUM OXIDE TAB 400 MG (241.3 MG ELEMENTAL MG) SCH MG: 400 (241.3 MG) TAB at 09:18

## 2018-12-21 RX ADMIN — POLYETHYLENE GLYCOL 3350 SCH GM: 17 POWDER, FOR SOLUTION ORAL at 11:57

## 2018-12-21 RX ADMIN — OXYCODONE HYDROCHLORIDE SCH MG: 5 TABLET ORAL at 11:57

## 2018-12-21 RX ADMIN — CLOPIDOGREL BISULFATE SCH MG: 75 TABLET, FILM COATED ORAL at 09:19

## 2018-12-21 RX ADMIN — DOCUSATE SODIUM SCH MG: 100 CAPSULE, LIQUID FILLED ORAL at 09:19

## 2018-12-21 RX ADMIN — ATENOLOL SCH MG: 50 TABLET ORAL at 09:36

## 2018-12-21 RX ADMIN — FOLIC ACID SCH MG: 1 TABLET ORAL at 09:18

## 2018-12-21 NOTE — CONSULTATION REPORT E
Consultation Report



NAME: GEOVANNI ISRAEL

MRN:  [id number]               : 1945      AGE: 73Y

DATE: 2018               ROOM:    [address]



TO:   GAYLE CALDERON M.D.



FROM: MED VICTOR M.D.

      Requesting Physician



CHIEF COMPLAINT:

Back pain.



HISTORY OF PRESENT ILLNESS:

This is a 73-year-old male who was admitted on the above date with altered

mental status secondary to pain medications.  He is noted to have a T9

compression fracture which occurred approximately 10 days prior to

admission on an outpatient basis while visiting a physician.  Apparently

he twisted and had severe pain between his shoulders.  He has known

osteoporosis and prior compression fractures at other levels.  He was seen

on an outpatient basis by, what I believe, to be Dr. Remy Hernandez.  MRI was

obtained demonstrating a T9 acute fracture with multiple other compression

fractures which were chronic.  Plan was for kyphoplasty on an outpatient

basis, however, the altered mental status required evaluation and

admission in the emergency room.  Since then consultation has been made

for pain management and treatment of the fracture on an inpatient basis. 

At present time the patient complains of severe pain in the back.  He is

on limited medications due to his altered mental status the other day,

however, this has improved considerably.  He is now conversant and

articulate, although appears in moderate significant distress.  He denies

any numbness, tingling, in the lower extremities.  He has had problems

with fecal impaction and constipation secondary to pain medications.



PAST MEDICAL HISTORY:

Other medical problems include; history of myocardial infarction, COPD,

asthma, and depression.  He also has rheumatoid arthritis.



MEDICATIONS:

As per chart.  He has been on Plavix and aspirin.



PAST SURGICAL HISTORY:

Includes cardiac catheterization, cholecystectomy, placement of coronary

stents, abdominal aortic aneurysm and bilateral femoral popliteal

grafting.



SOCIAL HISTORY:

He is a nonsmoker, nondrinker.  He does not use recreational drugs.  His

family reports that he was quite independent at home prior to the

occurrence of the altered mental status and the intractable pain from the

compression fracture.



ALLERGIES:

None.



REVIEW OF SYSTEMS:

As per chart.



PHYSICAL EXAMINATION:

VITAL SIGNS:  Stable.



GENERAL:  He is awake, in distress.  His speech is articulate though slow

in production.



SKIN:  Dry with bruising in the upper extremities.



HEENT:  Does not reveal any traumatic injury.



NECK:  Without JVD.



CARDIAC:  Has a regular rhythm.



ABDOMEN:  Is mildly obese; nontender, nondistended.



EXTREMITIES:  Demonstrate bruising.  There is no clubbing.  Good capillary

refill is noted in the fingers.  Fungal infections are noted in the toes.



NEUROLOGIC:  Exam reveals normal sensation to light touch and scratch. 

Motor exam was difficult to assess secondary to pain.



LABORATORY DATA:

As per chart, including a platelet count of 132.



IMPRESSION AND PLAN:

1.  Altered mental status, resolving, secondary to medications.  We will

resume medications but in a monitored and much lower dose setting.

2.  MRI of the thoracic spine has been reviewed.  This is amenable to

kyphoplasty treatment.  Blood thinning medications, Plavix and aspirin,

need to be held.  I have talked to Dr. Abbasi regarding this and these

will be stopped per him and Lovenox started.  The plan will be for

kyphoplasty in approximately 6-7 days to allow time for both the aspirin

and the Plavix to clear from the system and provide safety for a surgical

approach.

3.  Other medical problems will be managed per the hospitalist at their

discretion.





DICTATING PHYSICIAN: GAYLE CALDERON M.D.





[id number]                  DT: 2018    [id number]

PHY#: [id number]            DD: 2018    1054

ID:   [id number]           JOB#: [id number]      ACCT: [id number]



cc:GAYLE CALDERON M.D.

>

## 2018-12-21 NOTE — PDOC PROGRESS REPORT
Subjective


Progress Note for:: 12/21/18


Subjective:: 





Definitely more alert and interactive today.


Reason For Visit: 


INTRACTABLE BACK PAIN, FECAL IMPACTION,








Physical Exam


Vital Signs: 


                                        











Temp Pulse Resp BP Pulse Ox


 


 98.6 F   70   21 H  122/58 L  91 L


 


 12/21/18 15:16  12/21/18 16:45  12/21/18 12:13  12/21/18 16:45  12/21/18 15:16








                                 Intake & Output











 12/20/18 12/21/18 12/22/18





 06:59 06:59 06:59


 


Intake Total 500 2000 


 


Output Total  1000 


 


Balance 500 1000 


 


Weight 74.843 kg 80.6 kg 80.6 kg











General appearance: PRESENT: cooperative, mild distress, well-developed


Head exam: PRESENT: atraumatic, normocephalic


Mouth exam: PRESENT: dry mucosa, tongue midline


Respiratory exam: PRESENT: clear to auscultation hellen, symmetrical, unlabored, 

other - Chest brace in place.  ABSENT: prolonged expiratory phas, rales, 

rhonchi, wheezes


Cardiovascular exam: PRESENT: RRR, +S1, +S2


GI/Abdominal exam: PRESENT: normal bowel sounds, soft.  ABSENT: distended, 

tenderness


Extremities exam: ABSENT: pedal edema


Neurological exam: PRESENT: alert, awake, oriented to person, oriented to place,

oriented to situation, CN II-XII grossly intact


Psychiatric exam: PRESENT: flat affect





Results


Laboratory Results: 


                                        





                                 12/21/18 05:40 





                                 12/21/18 05:40 





                                        











  12/21/18 12/21/18 12/21/18





  05:40 05:40 05:40


 


WBC  6.5  


 


RBC  3.62 L  


 


Hgb  11.7 L D  


 


Hct  35.2 L  


 


MCV  97  


 


MCH  32.2  


 


MCHC  33.1  


 


RDW  17.2 H  


 


Plt Count  135 L  


 


Seg Neutrophils %  Not Reportable  


 


Lymphocytes %  Not Reportable  


 


Monocytes %  Not Reportable  


 


Eosinophils %  Not Reportable  


 


Basophils %  Not Reportable  


 


Absolute Neutrophils  Not Reportable  


 


Absolute Lymphocytes  Not Reportable  


 


Absolute Monocytes  Not Reportable  


 


Absolute Eosinophils  Not Reportable  


 


Absolute Basophils  Not Reportable  


 


Sodium   144.3 


 


Potassium   3.9 


 


Chloride   119 H 


 


Carbon Dioxide   18 L 


 


Anion Gap   7 


 


BUN   25 H 


 


Creatinine   0.82 


 


Est GFR ( Amer)   > 60 


 


Est GFR (Non-Af Amer)   > 60 


 


Glucose   103 


 


Calcium   8.9 


 


Magnesium   2.2 


 


Total Bilirubin   1.1 


 


AST   23 


 


ALT   25 


 


Alkaline Phosphatase   76 


 


Ammonia    17.2


 


Total Protein   5.4 L 


 


Albumin   2.8 L 











Impressions: 


                                        





Chest X-Ray  12/20/18 01:41


IMPRESSION: 


 


No significant change.


 








Head CT  12/20/18 01:41


IMPRESSION:


 


No acute intracranial abnormality.


Acute sinusitis.


 


TECHNICAL DOCUMENTATION:


 


Quality ID # 436: Final reports with documentation of one or more


dose reduction techniques (e.g., Automated exposure control,


adjustment of the mA and/or kV according to patient size, use of


iterative reconstruction technique)


 


copyright 2011 [employer]- All Rights Reserved


 














Assessment & Plan





- Diagnosis


(1) Altered mental status


Qualifiers: 


   Altered mental status type: delirium   Qualified Code(s): R41.0 - 

Disorientation, unspecified   


Is this a current diagnosis for this admission?: Yes   


Plan: 


The patient was quite clear today.  I believe the medications are wearing off.  

He has been started on low-dose analgesia for his compression fracture.  We will

need to monitor his mental status.








(2) Hyperkalemia


Is this a current diagnosis for this admission?: Yes   


Plan: 


Potassium is now in the normal range.  He will continue on a low potassium diet.

 I have advanced his diet to soft mechanical so hopefully he will begin eating 

more.








(3) Fecal impaction


Is this a current diagnosis for this admission?: Yes   


Plan: 


He has moved his bowels.  In fact he is on several stool softeners.  His 

appetite is been poor but as he begins eating more he should have more regular 

bowel movements.








(4) Compression fracture of T9 vertebra


Is this a current diagnosis for this admission?: Yes   


Plan: 


The patient has a new compression fracture of T9.  He has multiple other 

compression fractures.  He was seen by Dr. Michael Norton.  We are going to hold

Plavix and aspirin and consider kyphoplasty after approximately 5 days.








- Time


Time Spent with patient: 25-34 minutes


Medications reviewed and adjusted accordingly: Yes

## 2018-12-22 LAB
ANION GAP SERPL CALC-SCNC: 9 MMOL/L (ref 5–19)
BUN SERPL-MCNC: 22 MG/DL (ref 7–20)
CALCIUM: 8.9 MG/DL (ref 8.4–10.2)
CHLORIDE SERPL-SCNC: 113 MMOL/L (ref 98–107)
CO2 SERPL-SCNC: 21 MMOL/L (ref 22–30)
GLUCOSE SERPL-MCNC: 105 MG/DL (ref 75–110)
POTASSIUM SERPL-SCNC: 4.1 MMOL/L (ref 3.6–5)
SODIUM SERPL-SCNC: 143 MMOL/L (ref 137–145)

## 2018-12-22 RX ADMIN — DOCUSATE SODIUM SCH MG: 100 CAPSULE, LIQUID FILLED ORAL at 11:29

## 2018-12-22 RX ADMIN — MAGNESIUM OXIDE TAB 400 MG (241.3 MG ELEMENTAL MG) SCH MG: 400 (241.3 MG) TAB at 11:31

## 2018-12-22 RX ADMIN — POLYETHYLENE GLYCOL 3350 SCH GM: 17 POWDER, FOR SOLUTION ORAL at 11:32

## 2018-12-22 RX ADMIN — PREGABALIN SCH MG: 25 CAPSULE ORAL at 18:02

## 2018-12-22 RX ADMIN — OXYCODONE HYDROCHLORIDE SCH MG: 5 TABLET ORAL at 15:59

## 2018-12-22 RX ADMIN — OXYCODONE HYDROCHLORIDE SCH MG: 5 TABLET ORAL at 03:53

## 2018-12-22 RX ADMIN — FOLIC ACID SCH MG: 1 TABLET ORAL at 11:31

## 2018-12-22 RX ADMIN — DOCUSATE SODIUM SCH MG: 100 CAPSULE, LIQUID FILLED ORAL at 17:57

## 2018-12-22 RX ADMIN — ENOXAPARIN SODIUM SCH MG: 80 INJECTION SUBCUTANEOUS at 21:10

## 2018-12-22 RX ADMIN — OXYCODONE HYDROCHLORIDE SCH MG: 5 TABLET ORAL at 00:29

## 2018-12-22 RX ADMIN — TAMSULOSIN HYDROCHLORIDE SCH MG: 0.4 CAPSULE ORAL at 11:30

## 2018-12-22 RX ADMIN — OXYCODONE HYDROCHLORIDE SCH MG: 5 TABLET ORAL at 07:59

## 2018-12-22 RX ADMIN — OXYCODONE HYDROCHLORIDE SCH MG: 5 TABLET ORAL at 23:59

## 2018-12-22 RX ADMIN — MAGNESIUM HYDROXIDE SCH ML: 400 SUSPENSION ORAL at 11:29

## 2018-12-22 RX ADMIN — ATENOLOL SCH MG: 50 TABLET ORAL at 11:30

## 2018-12-22 RX ADMIN — OXYCODONE HYDROCHLORIDE SCH MG: 5 TABLET ORAL at 12:15

## 2018-12-22 RX ADMIN — FINASTERIDE SCH MG: 5 TABLET, FILM COATED ORAL at 11:29

## 2018-12-22 RX ADMIN — ENOXAPARIN SODIUM SCH MG: 80 INJECTION SUBCUTANEOUS at 11:30

## 2018-12-22 RX ADMIN — OXYCODONE HYDROCHLORIDE SCH MG: 5 TABLET ORAL at 20:17

## 2018-12-22 RX ADMIN — POLYETHYLENE GLYCOL 3350 SCH GM: 17 POWDER, FOR SOLUTION ORAL at 17:57

## 2018-12-22 NOTE — PDOC PROGRESS REPORT
Subjective


Progress Note for:: 12/22/18


Subjective:: 





Still in pain.  Mouth seems dry.


Reason For Visit: 


INTRACTABLE BACK PAIN, FECAL IMPACTION,








Physical Exam


Vital Signs: 


                                        











Temp Pulse Resp BP Pulse Ox


 


 98.3 F   67   19   135/73 H  94 


 


 12/22/18 16:00  12/22/18 16:00  12/22/18 16:00  12/22/18 16:00  12/22/18 16:00








                                 Intake & Output











 12/21/18 12/22/18 12/23/18





 06:59 06:59 06:59


 


Intake Total 2000 750 


 


Output Total 1000 500 


 


Balance 1000 250 


 


Weight 80.6 kg 80.8 kg 











General appearance: PRESENT: cooperative, mild distress, well-developed


Head exam: PRESENT: atraumatic, normocephalic


Mouth exam: PRESENT: dry mucosa


Respiratory exam: PRESENT: clear to auscultation hellen, symmetrical, unlabored.  

ABSENT: rales, rhonchi, wheezes


Cardiovascular exam: PRESENT: RRR, +S1, +S2


GI/Abdominal exam: PRESENT: normal bowel sounds, soft.  ABSENT: distended, 

tenderness


Extremities exam: ABSENT: pedal edema


Neurological exam: PRESENT: awake, oriented to person, oriented to place, 

oriented to situation, CN II-XII grossly intact


Psychiatric exam: PRESENT: appropriate affect.  ABSENT: agitated, anxious


Skin exam: PRESENT: other - Bruising on arms





Results


Laboratory Results: 


                                        





                                 12/21/18 05:40 





                                 12/22/18 04:13 





                                        











  12/21/18 12/22/18





  19:55 04:13


 


Sodium   143.0


 


Potassium   4.1


 


Chloride   113 H


 


Carbon Dioxide   21 L


 


Anion Gap   9


 


BUN   22 H


 


Creatinine   0.93


 


Est GFR ( Amer)   > 60


 


Est GFR (Non-Af Amer)   > 60


 


Glucose   105


 


Calcium   8.9


 


Urine Color  YELLOW 


 


Urine Appearance  SLIGHTLY-CLOUDY 


 


Urine pH  6.0 


 


Ur Specific Gravity  1.023 


 


Urine Protein  30 H 


 


Urine Glucose (UA)  NEGATIVE 


 


Urine Ketones  NEGATIVE 


 


Urine Blood  MODERATE H 


 


Urine Nitrite  NEGATIVE 


 


Ur Leukocyte Esterase  TRACE H 


 


Urine WBC (Auto)  18 


 


Urine RBC (Auto)  49 











Impressions: 


                                        





Chest X-Ray  12/20/18 01:41


IMPRESSION: 


 


No significant change.


 








Head CT  12/20/18 01:41


IMPRESSION:


 


No acute intracranial abnormality.


Acute sinusitis.


 


TECHNICAL DOCUMENTATION:


 


Quality ID # 436: Final reports with documentation of one or more


dose reduction techniques (e.g., Automated exposure control,


adjustment of the mA and/or kV according to patient size, use of


iterative reconstruction technique)


 


copyright 2011 [employer]- All Rights Reserved


 














Assessment & Plan





- Diagnosis


(1) Altered mental status


Qualifiers: 


   Altered mental status type: delirium   Qualified Code(s): R41.0 - 

Disorientation, unspecified   


Is this a current diagnosis for this admission?: Yes   


Plan: 


Acute encephalopathy resolved.  We will monitor closely on analgesic medication 

for adverse effect.








(2) Hyperkalemia


Is this a current diagnosis for this admission?: Yes   


Plan: 


Potassium currently normal.  Continue to monitor.








(3) Fecal impaction


Is this a current diagnosis for this admission?: Yes   


Plan: 


Resolved








(4) Compression fracture of T9 vertebra


Is this a current diagnosis for this admission?: Yes   


Plan: 


The patient is still in pain.  He is wearing his brace.  I am to add 25 mg of 

Lyrica scheduled every 12 hours and allow a 25 mg dose at a 6-hour interval if 

needed.  I would like to be very conservative due to the encephalopathy.  The 

patient stated that he is tried a Lidoderm patch in the past but gave him a 

funny taste in his mouth.  I am wondering if he actually had a fentanyl patch.  

We will see how the current regimen works and make adjustments if needed.








- Time


Time Spent with patient: 15-24 minutes


Medications reviewed and adjusted accordingly: Yes

## 2018-12-23 RX ADMIN — ENOXAPARIN SODIUM SCH MG: 80 INJECTION SUBCUTANEOUS at 09:51

## 2018-12-23 RX ADMIN — PREGABALIN SCH MG: 25 CAPSULE ORAL at 05:16

## 2018-12-23 RX ADMIN — PREGABALIN SCH MG: 25 CAPSULE ORAL at 17:44

## 2018-12-23 RX ADMIN — OXYCODONE HYDROCHLORIDE SCH MG: 5 TABLET ORAL at 12:01

## 2018-12-23 RX ADMIN — TAMSULOSIN HYDROCHLORIDE SCH MG: 0.4 CAPSULE ORAL at 09:51

## 2018-12-23 RX ADMIN — OXYCODONE HYDROCHLORIDE SCH MG: 5 TABLET ORAL at 17:43

## 2018-12-23 RX ADMIN — ATENOLOL SCH MG: 50 TABLET ORAL at 09:52

## 2018-12-23 RX ADMIN — FINASTERIDE SCH MG: 5 TABLET, FILM COATED ORAL at 09:52

## 2018-12-23 RX ADMIN — POLYETHYLENE GLYCOL 3350 SCH: 17 POWDER, FOR SOLUTION ORAL at 09:52

## 2018-12-23 RX ADMIN — DOCUSATE SODIUM SCH MG: 100 CAPSULE, LIQUID FILLED ORAL at 09:51

## 2018-12-23 RX ADMIN — DOCUSATE SODIUM SCH: 100 CAPSULE, LIQUID FILLED ORAL at 17:42

## 2018-12-23 RX ADMIN — OXYCODONE HYDROCHLORIDE SCH MG: 5 TABLET ORAL at 03:52

## 2018-12-23 RX ADMIN — OXYCODONE HYDROCHLORIDE SCH MG: 5 TABLET ORAL at 08:31

## 2018-12-23 RX ADMIN — MAGNESIUM HYDROXIDE SCH ML: 400 SUSPENSION ORAL at 09:52

## 2018-12-23 RX ADMIN — ENOXAPARIN SODIUM SCH: 80 INJECTION SUBCUTANEOUS at 22:28

## 2018-12-23 RX ADMIN — POLYETHYLENE GLYCOL 3350 SCH: 17 POWDER, FOR SOLUTION ORAL at 17:42

## 2018-12-23 RX ADMIN — FOLIC ACID SCH MG: 1 TABLET ORAL at 09:51

## 2018-12-23 RX ADMIN — MAGNESIUM OXIDE TAB 400 MG (241.3 MG ELEMENTAL MG) SCH MG: 400 (241.3 MG) TAB at 09:51

## 2018-12-23 RX ADMIN — OXYCODONE HYDROCHLORIDE SCH MG: 5 TABLET ORAL at 20:48

## 2018-12-23 NOTE — PDOC PROGRESS REPORT
Subjective


Progress Note for:: 12/23/18


Subjective:: 





The patient reports some improvement in pain management.  Unfortunately he 

continues to slide down in the bed and the slides the chest brace upwards.


Reason For Visit: 


INTRACTABLE BACK PAIN, FECAL IMPACTION,








Physical Exam


Vital Signs: 


                                        











Temp Pulse Resp BP Pulse Ox


 


 98.3 F   66   18   139/69 H  90 L


 


 12/23/18 11:03  12/23/18 11:03  12/23/18 11:03  12/23/18 11:03  12/23/18 11:03








                                 Intake & Output











 12/22/18 12/23/18 12/24/18





 06:59 06:59 06:59


 


Intake Total 750  


 


Output Total 500 880 


 


Balance 250 -880 


 


Weight 80.8 kg 80.1 kg 











General appearance: PRESENT: no acute distress, cooperative, well-developed


Respiratory exam: PRESENT: clear to auscultation hellen, symmetrical, unlabored.  

ABSENT: accessory muscle use, prolonged expiratory phas, rales, rhonchi, wheezes


Cardiovascular exam: PRESENT: RRR, +S1, +S2


Vascular exam: PRESENT: pallor


GI/Abdominal exam: PRESENT: normal bowel sounds, soft.  ABSENT: distended, 

guarding, tenderness


Neurological exam: PRESENT: alert, awake, oriented to person, oriented to place,

oriented to time, oriented to situation, CN II-XII grossly intact


Psychiatric exam: PRESENT: appropriate affect, normal mood.  ABSENT: agitated, 

anxious


Focused psych exam: ABSENT: restlessness





Results


Laboratory Results: 


                                        





                                 12/21/18 05:40 





                                 12/22/18 04:13 








Impressions: 


                                        





Chest X-Ray  12/20/18 01:41


IMPRESSION: 


 


No significant change.


 








Head CT  12/20/18 01:41


IMPRESSION:


 


No acute intracranial abnormality.


Acute sinusitis.


 


TECHNICAL DOCUMENTATION:


 


Quality ID # 436: Final reports with documentation of one or more


dose reduction techniques (e.g., Automated exposure control,


adjustment of the mA and/or kV according to patient size, use of


iterative reconstruction technique)


 


copyright 2011 [employer]- All Rights Reserved


 














Assessment & Plan





- Diagnosis


(1) Altered mental status


Qualifiers: 


   Altered mental status type: delirium   Qualified Code(s): R41.0 - 

Disorientation, unspecified   


Is this a current diagnosis for this admission?: Yes   


Plan: 


Resolved.  Monitor for adverse effects of analgesia medications








(2) Hyperkalemia


Is this a current diagnosis for this admission?: Yes   


Plan: 


Resolved.  Monitor serum potassium level.








(3) Fecal impaction


Is this a current diagnosis for this admission?: Yes   


Plan: 


Resolved.  Continue scheduled Colace and MiraLAX.








(4) Compression fracture of T9 vertebra


Is this a current diagnosis for this admission?: Yes   


Plan: 


Patient reports improved pain management.  Awaiting possible kyphoplasty next 

week.  I have asked for the patient to be out of bed for meals








- Time


Time Spent with patient: 15-24 minutes


Medications reviewed and adjusted accordingly: Yes

## 2018-12-24 LAB
ERYTHROCYTE [DISTWIDTH] IN BLOOD BY AUTOMATED COUNT: 17 % (ref 11.5–14)
HCT VFR BLD CALC: 34.5 % (ref 37.9–51)
HGB BLD-MCNC: 11.6 G/DL (ref 13.5–17)
MCH RBC QN AUTO: 32.4 PG (ref 27–33.4)
MCHC RBC AUTO-ENTMCNC: 33.7 G/DL (ref 32–36)
MCV RBC AUTO: 96 FL (ref 80–97)
PLATELET # BLD: 173 10^3/UL (ref 150–450)
RBC # BLD AUTO: 3.58 10^6/UL (ref 4.35–5.55)
WBC # BLD AUTO: 7 10^3/UL (ref 4–10.5)

## 2018-12-24 RX ADMIN — OXYCODONE HYDROCHLORIDE SCH MG: 5 TABLET ORAL at 11:50

## 2018-12-24 RX ADMIN — DOCUSATE SODIUM SCH: 100 CAPSULE, LIQUID FILLED ORAL at 10:53

## 2018-12-24 RX ADMIN — PREGABALIN SCH MG: 25 CAPSULE ORAL at 05:51

## 2018-12-24 RX ADMIN — FINASTERIDE SCH MG: 5 TABLET, FILM COATED ORAL at 10:58

## 2018-12-24 RX ADMIN — POLYETHYLENE GLYCOL 3350 SCH: 17 POWDER, FOR SOLUTION ORAL at 10:53

## 2018-12-24 RX ADMIN — POLYETHYLENE GLYCOL 3350 SCH: 17 POWDER, FOR SOLUTION ORAL at 17:23

## 2018-12-24 RX ADMIN — ATENOLOL SCH MG: 50 TABLET ORAL at 10:57

## 2018-12-24 RX ADMIN — IPRATROPIUM BROMIDE AND ALBUTEROL SULFATE SCH ML: 2.5; .5 SOLUTION RESPIRATORY (INHALATION) at 19:40

## 2018-12-24 RX ADMIN — DOCUSATE SODIUM SCH: 100 CAPSULE, LIQUID FILLED ORAL at 17:23

## 2018-12-24 RX ADMIN — ACETAMINOPHEN PRN MG: 325 TABLET ORAL at 02:53

## 2018-12-24 RX ADMIN — FONDAPARINUX SODIUM SCH MG: 7.5 INJECTION, SOLUTION SUBCUTANEOUS at 21:57

## 2018-12-24 RX ADMIN — TAMSULOSIN HYDROCHLORIDE SCH MG: 0.4 CAPSULE ORAL at 10:57

## 2018-12-24 RX ADMIN — PREGABALIN SCH MG: 25 CAPSULE ORAL at 18:06

## 2018-12-24 RX ADMIN — OXYCODONE HYDROCHLORIDE SCH MG: 5 TABLET ORAL at 02:54

## 2018-12-24 RX ADMIN — FOLIC ACID SCH MG: 1 TABLET ORAL at 10:58

## 2018-12-24 RX ADMIN — OXYCODONE HYDROCHLORIDE SCH MG: 5 TABLET ORAL at 20:59

## 2018-12-24 RX ADMIN — IPRATROPIUM BROMIDE AND ALBUTEROL SULFATE SCH ML: 2.5; .5 SOLUTION RESPIRATORY (INHALATION) at 14:02

## 2018-12-24 RX ADMIN — OXYCODONE HYDROCHLORIDE SCH MG: 5 TABLET ORAL at 08:02

## 2018-12-24 RX ADMIN — OXYCODONE HYDROCHLORIDE SCH: 5 TABLET ORAL at 04:00

## 2018-12-24 RX ADMIN — MAGNESIUM HYDROXIDE SCH ML: 400 SUSPENSION ORAL at 10:57

## 2018-12-24 RX ADMIN — OXYCODONE HYDROCHLORIDE SCH MG: 5 TABLET ORAL at 16:12

## 2018-12-24 RX ADMIN — MAGNESIUM OXIDE TAB 400 MG (241.3 MG ELEMENTAL MG) SCH MG: 400 (241.3 MG) TAB at 10:57

## 2018-12-24 NOTE — PDOC PROGRESS REPORT
Subjective


Progress Note for:: 12/24/18


Subjective:: 





The patient is feeling a bit lethargic today.  He has not been eating or 

drinking much.


Reason For Visit: 


INTRACTABLE BACK PAIN, FECAL IMPACTION,








Physical Exam


Vital Signs: 


                                        











Temp Pulse Resp BP Pulse Ox


 


 98.2 F   64   16   132/69 H  98 


 


 12/24/18 12:07  12/24/18 14:02  12/24/18 14:02  12/24/18 12:07  12/24/18 14:02








                                 Intake & Output











 12/23/18 12/24/18 12/25/18





 06:59 06:59 06:59


 


Intake Total  670 


 


Output Total 880 1200 


 


Balance -880 -530 


 


Weight 80.1 kg 79.1 kg 











General appearance: PRESENT: no acute distress, cooperative, well-developed


Mouth exam: PRESENT: dry mucosa, tongue midline


Respiratory exam: PRESENT: clear to auscultation hellen, symmetrical, unlabored, 

other - Difficult to auscultate with his brace in place..  ABSENT: accessory 

muscle use, rales - Anteriorly, rhonchi, wheezes


Cardiovascular exam: PRESENT: RRR, +S1, +S2


GI/Abdominal exam: PRESENT: normal bowel sounds, soft.  ABSENT: tenderness


Neurological exam: PRESENT: awake, oriented to person, oriented to place, 

oriented to situation.  ABSENT: alert - Slightly groggy





Results


Laboratory Results: 


                                        





                                 12/24/18 04:04 





                                 12/22/18 04:13 





                                        











  12/24/18





  04:04


 


WBC  7.0


 


RBC  3.58 L


 


Hgb  11.6 L


 


Hct  34.5 L


 


MCV  96


 


MCH  32.4


 


MCHC  33.7


 


RDW  17.0 H


 


Plt Count  173











Impressions: 


                                        





Head CT  12/20/18 01:41


IMPRESSION:


 


No acute intracranial abnormality.


Acute sinusitis.


 


TECHNICAL DOCUMENTATION:


 


Quality ID # 436: Final reports with documentation of one or more


dose reduction techniques (e.g., Automated exposure control,


adjustment of the mA and/or kV according to patient size, use of


iterative reconstruction technique)


 


copyright 2011 [employer]- All Rights Reserved


 








Chest X-Ray  12/24/18 00:00


IMPRESSION:  Interval development of infiltrate at left lung base.  Otherwise, 

no acute disease.


 














Assessment & Plan





- Diagnosis


(1) Altered mental status


Qualifiers: 


   Altered mental status type: delirium   Qualified Code(s): R41.0 - 

Disorientation, unspecified   


Is this a current diagnosis for this admission?: Yes   


Plan: 


His status today could be due to his pain medications.  I will decrease the 

scheduled oxycodone.  He also appears dry so I have started some IV fluid.








(2) Hyperkalemia


Is this a current diagnosis for this admission?: Yes   


Plan: 


Potassium is now normal.  Continue to monitor.








(3) Fecal impaction


Is this a current diagnosis for this admission?: Yes   


Plan: 


He did have a bowel movement today.  Continue to monitor and continue stool 

softeners.








(4) Compression fracture of T9 vertebra


Is this a current diagnosis for this admission?: Yes   


Plan: 


Likely to have kyphoplasty in 2-3 days.








- Time


Time Spent with patient: 15-24 minutes


Medications reviewed and adjusted accordingly: Yes

## 2018-12-24 NOTE — RADIOLOGY REPORT (SQ)
EXAM DESCRIPTION:  CHEST SINGLE VIEW



COMPLETED DATE/TIME:  12/24/2018 1:42 pm



REASON FOR STUDY:  productive cough



COMPARISON:  12/20/2018.  9/28/2017.



EXAM PARAMETERS:  NUMBER OF VIEWS: One view.

TECHNIQUE: Single frontal radiographic view of the chest acquired.

RADIATION DOSE: NA

LIMITATIONS: None.



FINDINGS:  LUNGS AND PLEURA: There is evidence of fibrotic scarring in both lung apices with bilatera
l apical pleural thickening.  There has been interval increase in streaky infiltrates at left lung ba
se consistent with left basilar pneumonia.

MEDIASTINUM AND HILAR STRUCTURES: No masses.  Contour normal.

HEART AND VASCULAR STRUCTURES: The heart is normal with atherosclerotic uncoiling of the thoracic aor
ta again noted.

BONES: No acute findings.

HARDWARE: None in the chest.

OTHER: No other significant finding.



IMPRESSION:  Interval development of infiltrate at left lung base.  Otherwise, no acute disease.



TECHNICAL DOCUMENTATION:  JOB ID:  [id number]

[id number]

 2011 [employer]- All Rights Reserved



Reading location - IP/workstation name: [email or web address]

## 2018-12-25 LAB
ANION GAP SERPL CALC-SCNC: 8 MMOL/L (ref 5–19)
BUN SERPL-MCNC: 19 MG/DL (ref 7–20)
CALCIUM: 9.3 MG/DL (ref 8.4–10.2)
CHLORIDE SERPL-SCNC: 107 MMOL/L (ref 98–107)
CO2 SERPL-SCNC: 24 MMOL/L (ref 22–30)
GLUCOSE SERPL-MCNC: 109 MG/DL (ref 75–110)
POTASSIUM SERPL-SCNC: 4.3 MMOL/L (ref 3.6–5)
SODIUM SERPL-SCNC: 139.3 MMOL/L (ref 137–145)

## 2018-12-25 RX ADMIN — OXYCODONE HYDROCHLORIDE SCH MG: 5 TABLET ORAL at 15:18

## 2018-12-25 RX ADMIN — POLYETHYLENE GLYCOL 3350 SCH: 17 POWDER, FOR SOLUTION ORAL at 09:34

## 2018-12-25 RX ADMIN — SODIUM CHLORIDE PRN MLS/HR: 9 INJECTION, SOLUTION INTRAVENOUS at 15:19

## 2018-12-25 RX ADMIN — POLYETHYLENE GLYCOL 3350 SCH: 17 POWDER, FOR SOLUTION ORAL at 17:02

## 2018-12-25 RX ADMIN — SODIUM CHLORIDE PRN MLS/HR: 9 INJECTION, SOLUTION INTRAVENOUS at 23:53

## 2018-12-25 RX ADMIN — TAMSULOSIN HYDROCHLORIDE SCH MG: 0.4 CAPSULE ORAL at 09:32

## 2018-12-25 RX ADMIN — MAGNESIUM HYDROXIDE SCH ML: 400 SUSPENSION ORAL at 09:33

## 2018-12-25 RX ADMIN — DOCUSATE SODIUM SCH: 100 CAPSULE, LIQUID FILLED ORAL at 17:01

## 2018-12-25 RX ADMIN — OXYCODONE HYDROCHLORIDE SCH MG: 5 TABLET ORAL at 20:59

## 2018-12-25 RX ADMIN — BUDESONIDE SCH MG: 0.5 SUSPENSION RESPIRATORY (INHALATION) at 20:29

## 2018-12-25 RX ADMIN — FOLIC ACID SCH MG: 1 TABLET ORAL at 09:33

## 2018-12-25 RX ADMIN — DOCUSATE SODIUM SCH MG: 100 CAPSULE, LIQUID FILLED ORAL at 09:32

## 2018-12-25 RX ADMIN — FINASTERIDE SCH MG: 5 TABLET, FILM COATED ORAL at 09:33

## 2018-12-25 RX ADMIN — IPRATROPIUM BROMIDE AND ALBUTEROL SULFATE SCH ML: 2.5; .5 SOLUTION RESPIRATORY (INHALATION) at 14:50

## 2018-12-25 RX ADMIN — OXYCODONE HYDROCHLORIDE SCH MG: 5 TABLET ORAL at 09:31

## 2018-12-25 RX ADMIN — PREGABALIN SCH MG: 25 CAPSULE ORAL at 06:42

## 2018-12-25 RX ADMIN — OXYCODONE HYDROCHLORIDE SCH MG: 5 TABLET ORAL at 03:41

## 2018-12-25 RX ADMIN — ACETAMINOPHEN PRN MG: 325 TABLET ORAL at 12:49

## 2018-12-25 RX ADMIN — MAGNESIUM OXIDE TAB 400 MG (241.3 MG ELEMENTAL MG) SCH MG: 400 (241.3 MG) TAB at 09:33

## 2018-12-25 RX ADMIN — ATENOLOL SCH MG: 50 TABLET ORAL at 09:33

## 2018-12-25 RX ADMIN — IPRATROPIUM BROMIDE AND ALBUTEROL SULFATE SCH ML: 2.5; .5 SOLUTION RESPIRATORY (INHALATION) at 02:14

## 2018-12-25 RX ADMIN — FONDAPARINUX SODIUM SCH MG: 7.5 INJECTION, SOLUTION SUBCUTANEOUS at 21:00

## 2018-12-25 RX ADMIN — IPRATROPIUM BROMIDE AND ALBUTEROL SULFATE SCH ML: 2.5; .5 SOLUTION RESPIRATORY (INHALATION) at 20:29

## 2018-12-25 RX ADMIN — IPRATROPIUM BROMIDE AND ALBUTEROL SULFATE SCH ML: 2.5; .5 SOLUTION RESPIRATORY (INHALATION) at 08:55

## 2018-12-25 NOTE — PDOC PROGRESS REPORT
Subjective


Progress Note for:: 12/25/18


Subjective:: 





Patient is sleeping but awakens easily.  He states that he was in the chair 

earlier.  It did not seem to relieve any discomfort in his back.


Reason For Visit: 


INTRACTABLE BACK PAIN, FECAL IMPACTION,








Physical Exam


Vital Signs: 


                                        











Temp Pulse Resp BP Pulse Ox


 


 98.4 F   65   14   130/66 H  91 L


 


 12/25/18 08:19  12/25/18 08:55  12/25/18 08:55  12/25/18 08:19  12/25/18 08:55








                                 Intake & Output











 12/24/18 12/25/18 12/26/18





 06:59 06:59 06:59


 


Intake Total 670 970 


 


Output Total 1200 1050 


 


Balance -530 -80 


 


Weight 79.1 kg 81.3 kg 











General appearance: PRESENT: cooperative, other - Somnolent


Head exam: PRESENT: normocephalic


Respiratory exam: PRESENT: clear to auscultation hellen - Anteriorly.  Difficult to

auscultate with his brace on., symmetrical, unlabored.  ABSENT: rhonchi, wheezes


Cardiovascular exam: PRESENT: RRR, +S1, +S2


GI/Abdominal exam: PRESENT: normal bowel sounds, soft.  ABSENT: tenderness


Neurological exam: PRESENT: awake - Quite sleepy.





Results


Laboratory Results: 


                                        





                                 12/24/18 04:04 





                                 12/25/18 05:00 





                                        











  12/25/18





  05:00


 


Sodium  139.3


 


Potassium  4.3


 


Chloride  107


 


Carbon Dioxide  24


 


Anion Gap  8


 


BUN  19


 


Creatinine  1.00


 


Est GFR ( Amer)  > 60


 


Est GFR (Non-Af Amer)  > 60


 


Glucose  109


 


Calcium  9.3


 


Magnesium  2.2











Impressions: 


                                        





Head CT  12/20/18 01:41


IMPRESSION:


 


No acute intracranial abnormality.


Acute sinusitis.


 


TECHNICAL DOCUMENTATION:


 


Quality ID # 436: Final reports with documentation of one or more


dose reduction techniques (e.g., Automated exposure control,


adjustment of the mA and/or kV according to patient size, use of


iterative reconstruction technique)


 


copyright 2011 [employer]- All Rights Reserved


 








Chest X-Ray  12/24/18 00:00


IMPRESSION:  Interval development of infiltrate at left lung base.  Otherwise, 

no acute disease.


 














Assessment & Plan





- Diagnosis


(1) Altered mental status


Qualifiers: 


   Altered mental status type: delirium   Qualified Code(s): R41.0 - 

Disorientation, unspecified   


Is this a current diagnosis for this admission?: Yes   


Plan: 


Other than being tired he has not exhibited any delirium or hallucinations.  We 

are trying to keep his pain medications at a minimum.








(2) Hyperkalemia


Is this a current diagnosis for this admission?: Yes   


Plan: 


Corrected and normal.  Continue to monitor.








(3) Fecal impaction


Is this a current diagnosis for this admission?: Yes   


Plan: 


Resolved








(4) Compression fracture of T9 vertebra


Is this a current diagnosis for this admission?: Yes   


Plan: 


He has been off of the Plavix and aspirin.  Hopefully he will have the 

kyphoplasty tomorrow or the next day.  I will discuss with Dr. Norton 

tomorrow.








- Time


Time Spent with patient: Less than 15 minutes


Medications reviewed and adjusted accordingly: Yes

## 2018-12-26 LAB
ERYTHROCYTE [DISTWIDTH] IN BLOOD BY AUTOMATED COUNT: 16.9 % (ref 11.5–14)
HCT VFR BLD CALC: 33.8 % (ref 37.9–51)
HGB BLD-MCNC: 11.2 G/DL (ref 13.5–17)
MCH RBC QN AUTO: 31.9 PG (ref 27–33.4)
MCHC RBC AUTO-ENTMCNC: 33.1 G/DL (ref 32–36)
MCV RBC AUTO: 97 FL (ref 80–97)
PLATELET # BLD: 230 10^3/UL (ref 150–450)
RBC # BLD AUTO: 3.51 10^6/UL (ref 4.35–5.55)
WBC # BLD AUTO: 8.5 10^3/UL (ref 4–10.5)

## 2018-12-26 RX ADMIN — MAGNESIUM HYDROXIDE SCH ML: 400 SUSPENSION ORAL at 09:38

## 2018-12-26 RX ADMIN — OXYCODONE HYDROCHLORIDE SCH MG: 5 TABLET ORAL at 09:37

## 2018-12-26 RX ADMIN — IPRATROPIUM BROMIDE AND ALBUTEROL SULFATE SCH ML: 2.5; .5 SOLUTION RESPIRATORY (INHALATION) at 14:14

## 2018-12-26 RX ADMIN — DOCUSATE SODIUM SCH: 100 CAPSULE, LIQUID FILLED ORAL at 17:38

## 2018-12-26 RX ADMIN — Medication PRN SPR: at 18:17

## 2018-12-26 RX ADMIN — ACETAMINOPHEN PRN MG: 325 TABLET ORAL at 08:11

## 2018-12-26 RX ADMIN — OXYCODONE HYDROCHLORIDE SCH MG: 5 TABLET ORAL at 14:56

## 2018-12-26 RX ADMIN — MAGNESIUM OXIDE TAB 400 MG (241.3 MG ELEMENTAL MG) SCH MG: 400 (241.3 MG) TAB at 09:36

## 2018-12-26 RX ADMIN — FINASTERIDE SCH MG: 5 TABLET, FILM COATED ORAL at 09:38

## 2018-12-26 RX ADMIN — POLYETHYLENE GLYCOL 3350 SCH GM: 17 POWDER, FOR SOLUTION ORAL at 09:36

## 2018-12-26 RX ADMIN — DOCUSATE SODIUM SCH MG: 100 CAPSULE, LIQUID FILLED ORAL at 09:37

## 2018-12-26 RX ADMIN — IPRATROPIUM BROMIDE AND ALBUTEROL SULFATE SCH ML: 2.5; .5 SOLUTION RESPIRATORY (INHALATION) at 08:31

## 2018-12-26 RX ADMIN — POLYETHYLENE GLYCOL 3350 SCH: 17 POWDER, FOR SOLUTION ORAL at 17:38

## 2018-12-26 RX ADMIN — FONDAPARINUX SODIUM SCH MG: 7.5 INJECTION, SOLUTION SUBCUTANEOUS at 21:06

## 2018-12-26 RX ADMIN — TAMSULOSIN HYDROCHLORIDE SCH MG: 0.4 CAPSULE ORAL at 09:37

## 2018-12-26 RX ADMIN — OXYCODONE HYDROCHLORIDE SCH MG: 5 TABLET ORAL at 02:53

## 2018-12-26 RX ADMIN — OXYCODONE HYDROCHLORIDE SCH MG: 5 TABLET ORAL at 21:06

## 2018-12-26 RX ADMIN — ATENOLOL SCH MG: 50 TABLET ORAL at 09:37

## 2018-12-26 RX ADMIN — BUDESONIDE SCH MG: 0.5 SUSPENSION RESPIRATORY (INHALATION) at 08:31

## 2018-12-26 RX ADMIN — BUDESONIDE SCH MG: 0.5 SUSPENSION RESPIRATORY (INHALATION) at 20:48

## 2018-12-26 RX ADMIN — IPRATROPIUM BROMIDE AND ALBUTEROL SULFATE SCH ML: 2.5; .5 SOLUTION RESPIRATORY (INHALATION) at 01:40

## 2018-12-26 RX ADMIN — IPRATROPIUM BROMIDE AND ALBUTEROL SULFATE SCH ML: 2.5; .5 SOLUTION RESPIRATORY (INHALATION) at 20:48

## 2018-12-26 RX ADMIN — FOLIC ACID SCH MG: 1 TABLET ORAL at 09:38

## 2018-12-26 NOTE — PDOC PROGRESS REPORT
Subjective


Progress Note for:: 12/26/18


Subjective:: 





The patient is resting in bed.  He is more alert today.  I believe the IV fluids

have helped.  The biggest question is when will the surgery occur.


Today he did complain of a sore throat.


Reason For Visit: 


INTRACTABLE BACK PAIN, FECAL IMPACTION,








Physical Exam


Vital Signs: 


                                        











Temp Pulse Resp BP Pulse Ox


 


 98.2 F   62   18   125/67   93 


 


 12/26/18 12:00  12/26/18 12:00  12/26/18 12:00  12/26/18 12:00  12/26/18 12:00








                                 Intake & Output











 12/25/18 12/26/18 12/27/18





 06:59 06:59 06:59


 


Intake Total 970 2049 118


 


Output Total 1050 1425 275


 


Balance -80 624 -157


 


Weight 81.3 kg 81.3 kg 











General appearance: PRESENT: no acute distress


Head exam: PRESENT: normocephalic


Ear exam: PRESENT: normal external ear exam


Mouth exam: PRESENT: dry mucosa


Throat exam: PRESENT: post pharyngeal erythema - But no white patches or 

pustules.


Respiratory exam: PRESENT: clear to auscultation hellen, symmetrical, unlabored.  

ABSENT: rales, wheezes


Cardiovascular exam: PRESENT: RRR, +S1, +S2


GI/Abdominal exam: PRESENT: distended, normal bowel sounds, soft.  ABSENT: 

tenderness


Neurological exam: PRESENT: alert, awake, oriented to person, oriented to place,

oriented to situation


Psychiatric exam: PRESENT: flat affect





Results


Laboratory Results: 


                                        





                                 12/26/18 04:30 





                                 12/25/18 05:00 





                                        











  12/26/18





  04:30


 


WBC  8.5


 


RBC  3.51 L


 


Hgb  11.2 L


 


Hct  33.8 L


 


MCV  97


 


MCH  31.9


 


MCHC  33.1


 


RDW  16.9 H


 


Plt Count  230











Impressions: 


                                        





Head CT  12/20/18 01:41


IMPRESSION:


 


No acute intracranial abnormality.


Acute sinusitis.


 


TECHNICAL DOCUMENTATION:


 


Quality ID # 436: Final reports with documentation of one or more


dose reduction techniques (e.g., Automated exposure control,


adjustment of the mA and/or kV according to patient size, use of


iterative reconstruction technique)


 


copyright 2011 [employer]- All Rights Reserved


 








Chest X-Ray  12/24/18 00:00


IMPRESSION:  Interval development of infiltrate at left lung base.  Otherwise, 

no acute disease.


 














Assessment & Plan





- Diagnosis


(1) Altered mental status


Qualifiers: 


   Altered mental status type: delirium   Qualified Code(s): R41.0 - 

Disorientation, unspecified   


Is this a current diagnosis for this admission?: Yes   


Plan: 


The patient was somewhat somnolent yesterday.  He has been getting IV fluids and

is much better today.  I am trying to keep a positive fluid balance.  He reports

that he has been drinking lots of water.








(2) Hyperkalemia


Is this a current diagnosis for this admission?: Yes   


Plan: 


Serum potassium remains normal.  I will continue to monitor and adjust the 

treatment plan as clinically indicated.








(3) Compression fracture of T9 vertebra


Is this a current diagnosis for this admission?: Yes   


Plan: 


I did speak to Dr. Norton today.  The patient is booked for the OR 7:30 Friday

morning.  Enough time will have passed for the Plavix and aspirin to wear off.  

I believe Dr. Norton will be writing the preop orders.








(4) Pharyngitis


Qualifiers: 


   Pharyngitis/tonsillitis etiology: unspecified etiology   Qualified Code(s): 

J02.9 - Acute pharyngitis, unspecified   


Is this a current diagnosis for this admission?: Yes   


Plan: 


The patient's throat is most likely from dryness.  There is no exudate or 

discoloration to suggest an infectious process.  We will start with Chloraseptic

spray and I feel that this will be very effective and will be all that he needs.








- Time


Time Spent with patient: Less than 15 minutes


Medications reviewed and adjusted accordingly: Yes

## 2018-12-27 RX ADMIN — OXYCODONE HYDROCHLORIDE SCH MG: 5 TABLET ORAL at 14:02

## 2018-12-27 RX ADMIN — OXYCODONE HYDROCHLORIDE SCH MG: 5 TABLET ORAL at 01:58

## 2018-12-27 RX ADMIN — IPRATROPIUM BROMIDE AND ALBUTEROL SULFATE SCH ML: 2.5; .5 SOLUTION RESPIRATORY (INHALATION) at 02:36

## 2018-12-27 RX ADMIN — IPRATROPIUM BROMIDE AND ALBUTEROL SULFATE SCH ML: 2.5; .5 SOLUTION RESPIRATORY (INHALATION) at 08:24

## 2018-12-27 RX ADMIN — OXYCODONE HYDROCHLORIDE SCH MG: 5 TABLET ORAL at 21:47

## 2018-12-27 RX ADMIN — FONDAPARINUX SODIUM SCH: 7.5 INJECTION, SOLUTION SUBCUTANEOUS at 21:44

## 2018-12-27 RX ADMIN — IPRATROPIUM BROMIDE AND ALBUTEROL SULFATE SCH ML: 2.5; .5 SOLUTION RESPIRATORY (INHALATION) at 19:46

## 2018-12-27 RX ADMIN — BUDESONIDE SCH MG: 0.5 SUSPENSION RESPIRATORY (INHALATION) at 19:46

## 2018-12-27 RX ADMIN — Medication PRN SPR: at 14:08

## 2018-12-27 RX ADMIN — POLYETHYLENE GLYCOL 3350 SCH GM: 17 POWDER, FOR SOLUTION ORAL at 09:56

## 2018-12-27 RX ADMIN — DOCUSATE SODIUM SCH: 100 CAPSULE, LIQUID FILLED ORAL at 09:54

## 2018-12-27 RX ADMIN — FOLIC ACID SCH MG: 1 TABLET ORAL at 09:53

## 2018-12-27 RX ADMIN — TAMSULOSIN HYDROCHLORIDE SCH MG: 0.4 CAPSULE ORAL at 09:55

## 2018-12-27 RX ADMIN — OXYCODONE HYDROCHLORIDE SCH MG: 5 TABLET ORAL at 09:53

## 2018-12-27 RX ADMIN — DOCUSATE SODIUM SCH MG: 100 CAPSULE, LIQUID FILLED ORAL at 17:23

## 2018-12-27 RX ADMIN — Medication PRN SPR: at 17:42

## 2018-12-27 RX ADMIN — FINASTERIDE SCH MG: 5 TABLET, FILM COATED ORAL at 09:55

## 2018-12-27 RX ADMIN — ATENOLOL SCH MG: 50 TABLET ORAL at 09:56

## 2018-12-27 RX ADMIN — IPRATROPIUM BROMIDE AND ALBUTEROL SULFATE SCH ML: 2.5; .5 SOLUTION RESPIRATORY (INHALATION) at 14:46

## 2018-12-27 RX ADMIN — MAGNESIUM HYDROXIDE SCH: 400 SUSPENSION ORAL at 09:56

## 2018-12-27 RX ADMIN — OXYCODONE HYDROCHLORIDE SCH MG: 5 TABLET ORAL at 06:02

## 2018-12-27 RX ADMIN — POLYETHYLENE GLYCOL 3350 SCH GM: 17 POWDER, FOR SOLUTION ORAL at 17:23

## 2018-12-27 RX ADMIN — MAGNESIUM OXIDE TAB 400 MG (241.3 MG ELEMENTAL MG) SCH MG: 400 (241.3 MG) TAB at 09:55

## 2018-12-27 RX ADMIN — Medication PRN SPR: at 09:56

## 2018-12-27 RX ADMIN — OXYCODONE HYDROCHLORIDE SCH MG: 5 TABLET ORAL at 17:23

## 2018-12-27 RX ADMIN — BUDESONIDE SCH MG: 0.5 SUSPENSION RESPIRATORY (INHALATION) at 08:24

## 2018-12-27 NOTE — PDOC PROGRESS REPORT
Subjective


Progress Note for:: 12/27/18


Subjective:: 





Patient resting in bed.  States with flareup of back pain, but his current pain 

medication regimen is helping.  Sore throat better.  Denies fever or chills, no 

chest pain or shortness of breath or palpitations.  No loss of urine or bowel 

continence.  Denies lower extremity weakness.


Reason For Visit: 


INTRACTABLE BACK PAIN, FECAL IMPACTION,








Physical Exam


Vital Signs: 


                                        











Temp Pulse Resp BP Pulse Ox


 


 98.4 F   67   16   92/68 L  95 


 


 12/27/18 12:00  12/27/18 14:46  12/27/18 14:46  12/27/18 12:00  12/27/18 16:10








                                 Intake & Output











 12/26/18 12/27/18 12/28/18





 06:59 06:59 06:59


 


Intake Total 2049 818 237


 


Output Total 1425 1225 350


 


Balance 624 -407 -113


 


Weight 81.3 kg 80.5 kg 














General appearance: PRESENT: no acute distress


Head exam: PRESENT: normocephalic


Ear exam: PRESENT: normal external ear exam


Mouth exam: PRESENT: dry mucosa


Throat exam: PRESENT: Minimal erythema, no white patches or pustules.


Respiratory exam: PRESENT: clear to auscultation hellen, symmetrical, unlabored.  

ABSENT: rales, wheezes


Cardiovascular exam: PRESENT: RRR, +S1, +S2


GI/Abdominal exam: PRESENT: distended, normal bowel sounds, soft.  ABSENT: 

tenderness


Neurological exam: PRESENT: alert, awake, oriented to person, oriented to place,

oriented to situation, no lower extremity weakness


Psychiatric exam: PRESENT: Normal affect








Results


Laboratory Results: 


                                        





                                 12/26/18 04:30 





                                 12/25/18 05:00 








Impressions: 


                                        





Head CT  12/20/18 01:41


IMPRESSION:


 


No acute intracranial abnormality.


Acute sinusitis.


 


TECHNICAL DOCUMENTATION:


 


Quality ID # 436: Final reports with documentation of one or more


dose reduction techniques (e.g., Automated exposure control,


adjustment of the mA and/or kV according to patient size, use of


iterative reconstruction technique)


 


copyright 2011 [employer]- All Rights Reserved


 








Chest X-Ray  12/24/18 00:00


IMPRESSION:  Interval development of infiltrate at left lung base.  Otherwise, 

no acute disease.


 














Assessment & Plan





- Diagnosis


(1) Compression fracture of T9 vertebra


Is this a current diagnosis for this admission?: Yes   





(2) COPD (chronic obstructive pulmonary disease)


Is this a current diagnosis for this admission?: Yes   





(3) Coronary artery disease


Is this a current diagnosis for this admission?: Yes   





(4) Pharyngitis


Qualifiers: 


   Pharyngitis/tonsillitis etiology: unspecified etiology   Qualified Code(s): 

J02.9 - Acute pharyngitis, unspecified   


Is this a current diagnosis for this admission?: Yes   


Plan: 


Improved.  Continue Chloraseptic spray.  Doubt infectious etiology.








- Plan Summary


Plan Summary: 





Patient is stable.  Will continue pain management at this time.  Awaiting 

kyphoplasty, planed for a.m.  CAD and COPD stable without flare or exacerbation 

or symptomatic at this time.  Check PT/PTT in a.m.

## 2018-12-28 LAB
ANION GAP SERPL CALC-SCNC: 10 MMOL/L (ref 5–19)
APTT BLD: 42.1 SEC (ref 23.5–35.8)
BUN SERPL-MCNC: 17 MG/DL (ref 7–20)
CALCIUM: 8.8 MG/DL (ref 8.4–10.2)
CHLORIDE SERPL-SCNC: 107 MMOL/L (ref 98–107)
CO2 SERPL-SCNC: 23 MMOL/L (ref 22–30)
ERYTHROCYTE [DISTWIDTH] IN BLOOD BY AUTOMATED COUNT: 16.9 % (ref 11.5–14)
GLUCOSE SERPL-MCNC: 92 MG/DL (ref 75–110)
HCT VFR BLD CALC: 33.7 % (ref 37.9–51)
HGB BLD-MCNC: 11.3 G/DL (ref 13.5–17)
INR PPP: 1.08
MCH RBC QN AUTO: 32.1 PG (ref 27–33.4)
MCHC RBC AUTO-ENTMCNC: 33.5 G/DL (ref 32–36)
MCV RBC AUTO: 96 FL (ref 80–97)
PLATELET # BLD: 254 10^3/UL (ref 150–450)
POTASSIUM SERPL-SCNC: 4.2 MMOL/L (ref 3.6–5)
PROTHROMBIN TIME: 14.6 SEC (ref 11.4–15.4)
RBC # BLD AUTO: 3.52 10^6/UL (ref 4.35–5.55)
SODIUM SERPL-SCNC: 139.5 MMOL/L (ref 137–145)
WBC # BLD AUTO: 6.7 10^3/UL (ref 4–10.5)

## 2018-12-28 PROCEDURE — 0PS43ZZ REPOSITION THORACIC VERTEBRA, PERCUTANEOUS APPROACH: ICD-10-PCS | Performed by: PAIN MEDICINE

## 2018-12-28 PROCEDURE — 0PU43JZ SUPPLEMENT THORACIC VERTEBRA WITH SYNTHETIC SUBSTITUTE, PERCUTANEOUS APPROACH: ICD-10-PCS | Performed by: PAIN MEDICINE

## 2018-12-28 RX ADMIN — OXYCODONE HYDROCHLORIDE SCH MG: 5 TABLET ORAL at 02:08

## 2018-12-28 RX ADMIN — OXYCODONE HYDROCHLORIDE SCH MG: 5 TABLET ORAL at 05:59

## 2018-12-28 RX ADMIN — OXYCODONE HYDROCHLORIDE SCH: 5 TABLET ORAL at 10:41

## 2018-12-28 RX ADMIN — POLYETHYLENE GLYCOL 3350 SCH: 17 POWDER, FOR SOLUTION ORAL at 10:41

## 2018-12-28 RX ADMIN — POLYETHYLENE GLYCOL 3350 SCH: 17 POWDER, FOR SOLUTION ORAL at 17:22

## 2018-12-28 RX ADMIN — IPRATROPIUM BROMIDE AND ALBUTEROL SULFATE SCH ML: 2.5; .5 SOLUTION RESPIRATORY (INHALATION) at 20:56

## 2018-12-28 RX ADMIN — DOCUSATE SODIUM SCH: 100 CAPSULE, LIQUID FILLED ORAL at 10:41

## 2018-12-28 RX ADMIN — FINASTERIDE SCH: 5 TABLET, FILM COATED ORAL at 10:40

## 2018-12-28 RX ADMIN — FOLIC ACID SCH: 1 TABLET ORAL at 10:40

## 2018-12-28 RX ADMIN — SODIUM CHLORIDE PRN MLS/HR: 9 INJECTION, SOLUTION INTRAVENOUS at 10:45

## 2018-12-28 RX ADMIN — MAGNESIUM OXIDE TAB 400 MG (241.3 MG ELEMENTAL MG) SCH: 400 (241.3 MG) TAB at 10:40

## 2018-12-28 RX ADMIN — IPRATROPIUM BROMIDE AND ALBUTEROL SULFATE SCH ML: 2.5; .5 SOLUTION RESPIRATORY (INHALATION) at 02:08

## 2018-12-28 RX ADMIN — BUDESONIDE SCH MG: 0.5 SUSPENSION RESPIRATORY (INHALATION) at 20:56

## 2018-12-28 RX ADMIN — TAMSULOSIN HYDROCHLORIDE SCH: 0.4 CAPSULE ORAL at 10:41

## 2018-12-28 RX ADMIN — FONDAPARINUX SODIUM SCH MG: 7.5 INJECTION, SOLUTION SUBCUTANEOUS at 21:18

## 2018-12-28 RX ADMIN — OXYCODONE HYDROCHLORIDE SCH: 5 TABLET ORAL at 15:22

## 2018-12-28 RX ADMIN — Medication PRN SPR: at 15:21

## 2018-12-28 RX ADMIN — IPRATROPIUM BROMIDE AND ALBUTEROL SULFATE SCH ML: 2.5; .5 SOLUTION RESPIRATORY (INHALATION) at 14:36

## 2018-12-28 RX ADMIN — OXYCODONE HYDROCHLORIDE SCH: 5 TABLET ORAL at 17:22

## 2018-12-28 RX ADMIN — MAGNESIUM HYDROXIDE SCH: 400 SUSPENSION ORAL at 10:40

## 2018-12-28 RX ADMIN — ATENOLOL SCH MG: 50 TABLET ORAL at 10:42

## 2018-12-28 RX ADMIN — CEFAZOLIN SODIUM SCH MLS/HR: 1 SOLUTION INTRAVENOUS at 23:31

## 2018-12-28 RX ADMIN — IPRATROPIUM BROMIDE AND ALBUTEROL SULFATE SCH ML: 2.5; .5 SOLUTION RESPIRATORY (INHALATION) at 08:58

## 2018-12-28 RX ADMIN — BUDESONIDE SCH MG: 0.5 SUSPENSION RESPIRATORY (INHALATION) at 08:58

## 2018-12-28 RX ADMIN — Medication PRN SPR: at 21:16

## 2018-12-28 RX ADMIN — DOCUSATE SODIUM SCH: 100 CAPSULE, LIQUID FILLED ORAL at 17:22

## 2018-12-28 RX ADMIN — OXYCODONE HYDROCHLORIDE SCH MG: 5 TABLET ORAL at 21:16

## 2018-12-28 RX ADMIN — SODIUM CHLORIDE PRN MLS/HR: 9 INJECTION, SOLUTION INTRAVENOUS at 02:59

## 2018-12-28 NOTE — PDOC PROGRESS REPORT
Subjective


Progress Note for:: 12/28/18


Subjective:: 





Patient resting in bed.  Still with periods of flareup of back pain, but his 

current pain medication regimen is helping.  Sore throat much better.  Denies 

fever or chills, no chest pain or shortness of breath or palpitations.  No loss 

of urine or bowel continence.  Denies lower extremity weakness.


Reason For Visit: 


INTRACTABLE BACK PAIN, FECAL IMPACTION,








Physical Exam


Vital Signs: 


                                        











Temp Pulse Resp BP Pulse Ox


 


 97.8 F   69   20   130/66 H  92 


 


 12/28/18 17:00  12/28/18 17:00  12/28/18 17:00  12/28/18 17:00  12/28/18 17:00








                                 Intake & Output











 12/27/18 12/28/18 12/29/18





 06:59 06:59 06:59


 


Intake Total 818 474 971


 


Output Total 1225 950 400


 


Balance -407 -850 571


 


Weight 80.5 kg 76.1 kg 











General appearance: PRESENT: no acute distress


Head exam: PRESENT: normocephalic


Ear exam: PRESENT: normal external ear exam


Mouth exam: PRESENT: dry mucosa


Throat exam: PRESENT: Minimal erythema, no white patches or pustules.


Respiratory exam: PRESENT: clear to auscultation hellen, symmetrical, unlabored.  

ABSENT: rales, wheezes


Cardiovascular exam: PRESENT: RRR, +S1, +S2


GI/Abdominal exam: PRESENT: distended, normal bowel sounds, soft.  ABSENT: 

tenderness


Neurological exam: PRESENT: alert, awake, oriented to person, oriented to place,

oriented to situation, no lower extremity weakness


Psychiatric exam: PRESENT: Normal affect








Results


Laboratory Results: 


                                        





                                 12/28/18 04:15 





                                 12/28/18 04:15 





                                        











  12/28/18 12/28/18





  04:15 04:15


 


WBC  6.7 


 


RBC  3.52 L 


 


Hgb  11.3 L 


 


Hct  33.7 L 


 


MCV  96 


 


MCH  32.1 


 


MCHC  33.5 


 


RDW  16.9 H 


 


Plt Count  254 


 


Sodium   139.5


 


Potassium   4.2


 


Chloride   107


 


Carbon Dioxide   23


 


Anion Gap   10


 


BUN   17


 


Creatinine   0.78


 


Est GFR ( Amer)   > 60


 


Est GFR (Non-Af Amer)   > 60


 


Glucose   92


 


Calcium   8.8











Impressions: 


                                        





Head CT  12/20/18 01:41


IMPRESSION:


 


No acute intracranial abnormality.


Acute sinusitis.


 


TECHNICAL DOCUMENTATION:


 


Quality ID # 436: Final reports with documentation of one or more


dose reduction techniques (e.g., Automated exposure control,


adjustment of the mA and/or kV according to patient size, use of


iterative reconstruction technique)


 


copyright 2011 [employer]- All Rights Reserved


 








Chest X-Ray  12/24/18 00:00


IMPRESSION:  Interval development of infiltrate at left lung base.  Otherwise, 

no acute disease.


 














Assessment & Plan





- Diagnosis


(1) Compression fracture of T9 vertebra


Is this a current diagnosis for this admission?: Yes   





(2) COPD (chronic obstructive pulmonary disease)


Is this a current diagnosis for this admission?: Yes   





(3) Coronary artery disease


Is this a current diagnosis for this admission?: Yes   





(4) Pharyngitis


Qualifiers: 


   Pharyngitis/tonsillitis etiology: unspecified etiology   Qualified Code(s): 

J02.9 - Acute pharyngitis, unspecified   


Is this a current diagnosis for this admission?: Yes   





- Plan Summary


Plan Summary: 





Patient is stable.  Will continue pain management.  Awaiting kyphoplasty, planed

for today today.  CAD and COPD remain stable without flare or exacerbation or 

symptomatic at this time.

## 2018-12-28 NOTE — OPERATIVE REPORT E
Operative Report



NAME: GEOVANNI ISRAEL

MRN:  [id number]          : 1945 AGE:  73Y

DATE OF SURGERY: 2018               ROOM: [id number]



PREOPERATIVE DIAGNOSIS:

T9 COMPRESSION FRACTURE WITH INTRACTABLE PAIN.



POSTOPERATIVE DIAGNOSIS:

T9 COMPRESSION FRACTURE WITH INTRACTABLE PAIN.



OPERATIVE PROCEDURE:

T9 balloon kyphoplasty under fluoroscopic guidance using bilateral

peripedicular approach.



SURGEON:

GAYLE CALDERON M.D.



ANESTHESIA:

MAC.



COMPLICATIONS:

None.



BLOOD LOSS:

Minimal.



INDICATIONS:

Intractable pain correlating to the T9 fracture.



SPECIMENS REMOVED:

None.



DESCRIPTION OF PROCEDURE:

After obtaining informed consent, advising the patient of the risks and

benefits, including serious neurological injury, bleeding, infection,

paralysis, aggravation of pain, failure to alleviate pain, allergic

reaction, and death.  He was taken to the operating room and placed

comfortably in the prone position.  MAC anesthesia was administered.  Once

assessed to be comfortable visually and verbally he was then prepped with

Chlorhexidine.  Appropriate drying time was allowed.  He was then draped. 

C-arms were placed strategically in the appropriate location for AP and

lateral views.  A T9 fracture was identified by counting from the last rib

bearing vertebrae as well as correlating this to MRI imaging.  Beginning

with a right parapedicular approach the skin was anesthetized at the

selected location with 1% lidocaine and bicarb.  The subcutaneous tissues

and periosteal tissues were anesthetized as well.  A small incision was

then made at the selected site.  The Express trocar was then advanced

under fluoroscopic guidance penetrating the parapedicular region. 

Multiple images were taken in the AP and lateral views to assure correct

location.  The vertebral body was penetrated as the medial wall of the

pedicle was passed.  The drill was then placed and advanced into the

anterior region of the vertebral body near the central location as seen in

the AP view.  The drill was removed and the balloon was placed without

difficulty.  This was expanded with ease causing some endplate elevation

of the superior endplate.  This procedure was then repeated as described

on the left with the same results.  Once suitable inflation of the

balloons occurred the balloons were deflated and filling began on the left

side with a methylmethacrylate monomer polymer cement.  A total of 3 mL

was injected into the left.  It should be noted that the cement spread on

both sides quite well nearly equally.  The filler was removed from the

left and a stylet was placed and filling was then initiated on the right

for a total of 0.6 additional mL, giving a total volume of 3.6 mL.  Very

good bilateral filling was noted with some endplate elevation of the

superior endplate and very little retrograde spreading within the

vertebral body and no extravasation or *------* of cement into the disk or

the vertebral canal was noted.  Once filling was felt to be satisfactory

the filler tubes removed.  The stylet was placed in the right trocar. 

Drying time was allowed for the cement to harden.  All instrumentation was

then removed.  The patient was then taken to the PACU for further

postoperative care after sterile dressings were placed.  He remained

neurologically and hemodynamically intact.



DICTATING PHYSICIAN:  GAYLE CALDERON M.D.





[id number]                  DT: 2018    [id number]

PHY#: [id number]            DD: 2018    1825

ID:   [id number]           JOB#: [id number]       ACCT: [id number]



cc:GAYLE CALDERON M.D.

>

## 2018-12-29 VITALS — SYSTOLIC BLOOD PRESSURE: 138 MMHG | DIASTOLIC BLOOD PRESSURE: 73 MMHG

## 2018-12-29 RX ADMIN — SODIUM CHLORIDE PRN MLS/HR: 9 INJECTION, SOLUTION INTRAVENOUS at 06:07

## 2018-12-29 RX ADMIN — FINASTERIDE SCH MG: 5 TABLET, FILM COATED ORAL at 09:05

## 2018-12-29 RX ADMIN — IPRATROPIUM BROMIDE AND ALBUTEROL SULFATE SCH ML: 2.5; .5 SOLUTION RESPIRATORY (INHALATION) at 13:56

## 2018-12-29 RX ADMIN — OXYCODONE HYDROCHLORIDE SCH MG: 5 TABLET ORAL at 14:11

## 2018-12-29 RX ADMIN — OXYCODONE HYDROCHLORIDE SCH MG: 5 TABLET ORAL at 03:00

## 2018-12-29 RX ADMIN — TAMSULOSIN HYDROCHLORIDE SCH MG: 0.4 CAPSULE ORAL at 09:07

## 2018-12-29 RX ADMIN — BUDESONIDE SCH MG: 0.5 SUSPENSION RESPIRATORY (INHALATION) at 07:54

## 2018-12-29 RX ADMIN — MAGNESIUM OXIDE TAB 400 MG (241.3 MG ELEMENTAL MG) SCH MG: 400 (241.3 MG) TAB at 09:05

## 2018-12-29 RX ADMIN — POLYETHYLENE GLYCOL 3350 SCH GM: 17 POWDER, FOR SOLUTION ORAL at 09:07

## 2018-12-29 RX ADMIN — IPRATROPIUM BROMIDE AND ALBUTEROL SULFATE SCH: 2.5; .5 SOLUTION RESPIRATORY (INHALATION) at 02:23

## 2018-12-29 RX ADMIN — OXYCODONE HYDROCHLORIDE SCH MG: 5 TABLET ORAL at 09:07

## 2018-12-29 RX ADMIN — DOCUSATE SODIUM SCH MG: 100 CAPSULE, LIQUID FILLED ORAL at 09:05

## 2018-12-29 RX ADMIN — CEFAZOLIN SODIUM SCH MLS/HR: 1 SOLUTION INTRAVENOUS at 06:06

## 2018-12-29 RX ADMIN — OXYCODONE HYDROCHLORIDE SCH MG: 5 TABLET ORAL at 06:07

## 2018-12-29 RX ADMIN — Medication PRN SPR: at 09:04

## 2018-12-29 RX ADMIN — ATENOLOL SCH MG: 50 TABLET ORAL at 09:05

## 2018-12-29 RX ADMIN — MAGNESIUM HYDROXIDE SCH ML: 400 SUSPENSION ORAL at 09:05

## 2018-12-29 RX ADMIN — CEFAZOLIN SODIUM SCH MLS/HR: 1 SOLUTION INTRAVENOUS at 11:55

## 2018-12-29 RX ADMIN — FOLIC ACID SCH MG: 1 TABLET ORAL at 09:06

## 2018-12-29 RX ADMIN — IPRATROPIUM BROMIDE AND ALBUTEROL SULFATE SCH ML: 2.5; .5 SOLUTION RESPIRATORY (INHALATION) at 07:54

## 2018-12-29 NOTE — PDOC DISCHARGE SUMMARY
General





- Admit/Disc Date/PCP


Admission Date/Primary Care Provider: 


  12/20/18 03:12





  RONY ARMENTA DO





Discharge Date: 12/29/18





- Discharge Diagnosis


(1) Compression fracture of T9 vertebra


Is this a current diagnosis for this admission?: Yes   





(2) COPD (chronic obstructive pulmonary disease)


Is this a current diagnosis for this admission?: Yes   





(3) Coronary artery disease


Is this a current diagnosis for this admission?: Yes   





(4) Pharyngitis


Is this a current diagnosis for this admission?: Yes   





(5) Altered mental status


Is this a current diagnosis for this admission?: Yes   


Summary: 


Resolved








(6) Hyperkalemia


Is this a current diagnosis for this admission?: Yes   


Summary: 


Resolved








- Additional Information


Resuscitation Status: Full Code


Prescriptions: 


Pregabalin [Lyrica 25 mg Capsule] 25 mg PO TID 30 Days #90 capsule


Home Medications: 








Atenolol [Tenormin 50 mg Tablet] 50 mg PO DAILY 12/20/18 


Baclofen [Baclofen 20 mg Tablet] 20 mg PO QIDP PRN 12/20/18 


Finasteride [Proscar] 5 mg PO DAILY 12/20/18 


Multivit-Min/FA/Lycopen/Lutein [Adults 50 Plus Multivitamin Tb] 1 tab PO DAILY 

12/20/18 


Polyethylene Glycol 3350 [Miralax Powder 17 gm/Packet] 1 packet PO DAILY 

12/20/18 


Prednisone [Deltasone 5 mg Tablet] 5 mg PO DAILY 12/20/18 


Simvastatin [Zocor 80 mg Tablet] 80 mg PO QHS 12/20/18 


Tamsulosin HCl [Flomax 0.4 mg Cap.sr] 0.8 mg PO DAILY 12/20/18 


Albuterol Sulfate [Proair HFA Inhalation Aerosol 8.5 gm MDI] 2 puff IH Q6HP PRN 

12/24/18 


Fluticasone/Vilanterol [Breo Ellipta 100-25 Mcg INH] 1 each IH DAILY 12/24/18 


Acetaminophen [Tylenol 325 mg Tablet] 650 mg PO Q6HP PRN  tablet 12/29/18 


Pregabalin [Lyrica 25 mg Capsule] 25 mg PO TID 30 Days #90 capsule 12/29/18 











History of Present Illness


History of Present Illness: 


Patient was admitted after presentation as in HPI below:





"GEOVANNI ISRAEL is a 73 year old male with a past medical history of COPD, 

coronary artery disease on Plavix, BPH and  acute T5 compression fracture With 

intractable pain 5 days ago.  He was placed on Lyrica and oxycodone.  He 

developed abdominal pain with fecal impaction prompting emergency room 

evaluation where he was partially manually disimpacted, discharged with bowel 

regiment and recommendation of avoiding narcotics.  With intractable back pain 

he saw orthopedic surgeon Dr peralta placed on baclofen and oxycodone 2 days ago.

 Today he is brought in by his son with confusion and constipation.  In the 

emergency room he is found to have leukocytosis, hyperkalemia, altered mental 

status and a left lower quadrant pain without guarding.  He is referred to the 

hospitalist for admission."








Hospital Course


Hospital Course: 





Patient was admitted.  His oxycodone was reduced and baclofen discontinued.  He 

was treated with enema for fecal impaction.  He had kyphoplasty done yesterday, 

12/28/18 and he is doing better.  No lower extremity swelling, no neuro 

compromise, pain is controlled.  He feels ready to go home.  His altered mental 

status, which was due to opiates, has completely normalized, he is alert and 

oriented x3.  He is being discharged in stable condition.  He is to follow-up 

with Dr. Pro for the kyphoplasty follow-up within 1 week.  He is also to 

follow-up with his primary care physician in 1 week.





Physical Exam


Vital Signs: 


                                        











Temp Pulse Resp BP Pulse Ox


 


 97.4 F   73   20   116/81   92 


 


 12/29/18 12:20  12/29/18 12:20  12/29/18 12:20  12/29/18 12:20  12/29/18 12:20








                                 Intake & Output











 12/28/18 12/29/18 12/30/18





 06:59 06:59 06:59


 


Intake Total 474 3446 50


 


Output Total 950 800 


 


Balance -476 2646 50


 


Weight 76.1 kg 81.2 kg 











General appearance: PRESENT: no acute distress


Head exam: PRESENT: normocephalic


Mouth exam: PRESENT: dry mucosa


Throat exam: PRESENT: Minimal erythema, no white patches or pustules.


Respiratory exam: PRESENT: clear to auscultation hellen, symmetrical, unlabored.  

ABSENT: rales, wheezes


Cardiovascular exam: PRESENT: RRR, +S1, +S2


GI/Abdominal exam: PRESENT: normal bowel sounds, soft.  ABSENT: tenderness


Neurological exam: PRESENT: alert, awake, oriented to person, oriented to place,

oriented to situation, no lower extremity weakness


Psychiatric exam: PRESENT: Normal affect








Results


Laboratory Results: 


                                        





                                 12/28/18 04:15 





                                 12/28/18 04:15 








Impressions: 


                                        





Head CT  12/20/18 01:41


IMPRESSION:


 


No acute intracranial abnormality.


Acute sinusitis.


 


TECHNICAL DOCUMENTATION:


 


Quality ID # [id number]: Final reports with documentation of one or more


dose reduction techniques (e.g., Automated exposure control,


adjustment of the mA and/or kV according to patient size, use of


iterative reconstruction technique)


 


copyright 2011 [employer]- All Rights Reserved


 








Chest X-Ray  12/24/18 00:00


IMPRESSION:  Interval development of infiltrate at left lung base.  Otherwise, 

no acute disease.


 








Fluoroscopy  12/28/18 00:00


IMPRESSION:  IMAGE(S) OBTAINED DURING PROCEDURE.


 








Thoracic Spine X-Ray  12/28/18 00:00


IMPRESSION:  IMAGE(S) OBTAINED DURING PROCEDURE.


 














Qualifiers





- *


PATIENT BEING DISCHARGED WITH ANY OF THE FOLLOWING DIAGNOSIS: No





Plan


Time Spent: Greater than 30 Minutes

## 2018-12-29 NOTE — RADIOLOGY REPORT (SQ)
EXAM DESCRIPTION:  NO CHG FLUORO; T SPINE AP/LAT



COMPLETED DATE/TIME:  12/28/2018 8:04 pm



REASON FOR STUDY:  KYPHOPLASTY T9 D50.8  OTHER IRON DEFICIENCY ANEMIAS



COMPARISON:  None.



FLUOROSCOPY TIME:  1.9 minutes

35 images saved to PACS.



TECHNIQUE:  Intra-operative images acquired during surgical procedure to evaluate progress.

NUMBER OF IMAGES: 35



LIMITATIONS:  None.



FINDINGS:  Selected images from kyphoplasty performed by Dr. Norton.



IMPRESSION:  IMAGE(S) OBTAINED DURING PROCEDURE.



COMMENT:  Quality :  Final reports for procedures using fluoroscopy that document radiation exp
osure indices, or exposure time and number of fluorographic images (if radiation exposure indices are
 not available)

Please consult full operative report of the attending physician for description of the procedure.



TECHNICAL DOCUMENTATION:  JOB ID:  [id number]

 2011 [employer]- All Rights Reserved



Reading location - IP/workstation name: [email or web address]

## 2018-12-29 NOTE — RADIOLOGY REPORT (SQ)
EXAM DESCRIPTION:  NO CHG FLUORO; T SPINE AP/LAT



COMPLETED DATE/TIME:  12/28/2018 8:04 pm



REASON FOR STUDY:  KYPHOPLASTY T9 D50.8  OTHER IRON DEFICIENCY ANEMIAS



COMPARISON:  None.



FLUOROSCOPY TIME:  1.9 minutes

35 images saved to PACS.



TECHNIQUE:  Intra-operative images acquired during surgical procedure to evaluate progress.

NUMBER OF IMAGES: 35



LIMITATIONS:  None.



FINDINGS:  Selected images from kyphoplasty performed by Dr. Norton.



IMPRESSION:  IMAGE(S) OBTAINED DURING PROCEDURE.



COMMENT:  Quality :  Final reports for procedures using fluoroscopy that document radiation exp
osure indices, or exposure time and number of fluorographic images (if radiation exposure indices are
 not available)

Please consult full operative report of the attending physician for description of the procedure.



TECHNICAL DOCUMENTATION:  JOB ID:  [id number]

 2011 [employer]- All Rights Reserved



Reading location - IP/workstation name: [email or web address]

## 2019-01-18 ENCOUNTER — HOSPITAL ENCOUNTER (EMERGENCY)
Dept: HOSPITAL 62 - ER | Age: 74
LOS: 1 days | Discharge: ANOTHER HOSPITAL | End: 2019-01-19
Payer: MEDICARE

## 2019-01-18 DIAGNOSIS — I25.2: ICD-10-CM

## 2019-01-18 DIAGNOSIS — R10.9: ICD-10-CM

## 2019-01-18 DIAGNOSIS — Z90.49: ICD-10-CM

## 2019-01-18 DIAGNOSIS — Z87.442: ICD-10-CM

## 2019-01-18 DIAGNOSIS — R10.30: ICD-10-CM

## 2019-01-18 DIAGNOSIS — E86.0: ICD-10-CM

## 2019-01-18 DIAGNOSIS — N39.0: ICD-10-CM

## 2019-01-18 DIAGNOSIS — N13.2: Primary | ICD-10-CM

## 2019-01-18 DIAGNOSIS — J44.9: ICD-10-CM

## 2019-01-18 DIAGNOSIS — J18.1: ICD-10-CM

## 2019-01-18 DIAGNOSIS — N17.9: ICD-10-CM

## 2019-01-18 DIAGNOSIS — R53.1: ICD-10-CM

## 2019-01-18 LAB
ABSOLUTE LYMPHOCYTES# (MANUAL): 0.1 10^3/UL (ref 0.5–4.7)
ABSOLUTE MONOCYTES # (MANUAL): 0.3 10^3/UL (ref 0.1–1.4)
ABSOLUTE NEUTROPHILS# (MANUAL): 7.8 10^3/UL (ref 1.7–8.2)
ADD MANUAL DIFF: YES
ALBUMIN SERPL-MCNC: 3.7 G/DL (ref 3.5–5)
ALP SERPL-CCNC: 96 U/L (ref 38–126)
ALT SERPL-CCNC: 31 U/L (ref 21–72)
ANION GAP SERPL CALC-SCNC: 11 MMOL/L (ref 5–19)
AST SERPL-CCNC: 34 U/L (ref 17–59)
BASE EXCESS BLDV CALC-SCNC: 0.4 MMOL/L
BASOPHILS NFR BLD MANUAL: 0 % (ref 0–2)
BILIRUB DIRECT SERPL-MCNC: 0.6 MG/DL (ref 0–0.4)
BILIRUB SERPL-MCNC: 1.2 MG/DL (ref 0.2–1.3)
BUN SERPL-MCNC: 37 MG/DL (ref 7–20)
CALCIUM: 9.7 MG/DL (ref 8.4–10.2)
CHLORIDE SERPL-SCNC: 107 MMOL/L (ref 98–107)
CO2 SERPL-SCNC: 22 MMOL/L (ref 22–30)
EOSINOPHIL NFR BLD MANUAL: 1 % (ref 0–6)
ERYTHROCYTE [DISTWIDTH] IN BLOOD BY AUTOMATED COUNT: 20.1 % (ref 11.5–14)
GLUCOSE SERPL-MCNC: 106 MG/DL (ref 75–110)
HCO3 BLDV-SCNC: 26.3 MMOL/L (ref 20–32)
HCT VFR BLD CALC: 39.5 % (ref 37.9–51)
HGB BLD-MCNC: 13.3 G/DL (ref 13.5–17)
INR PPP: 1.1
MCH RBC QN AUTO: 32.1 PG (ref 27–33.4)
MCHC RBC AUTO-ENTMCNC: 33.8 G/DL (ref 32–36)
MCV RBC AUTO: 95 FL (ref 80–97)
MONOCYTES % (MANUAL): 4 % (ref 3–13)
PCO2 BLDV: 47.1 MMHG (ref 35–63)
PH BLDV: 7.36 [PH] (ref 7.3–7.42)
PLATELET # BLD: 127 10^3/UL (ref 150–450)
PLATELET COMMENT: (no result)
POTASSIUM SERPL-SCNC: 4 MMOL/L (ref 3.6–5)
PROT SERPL-MCNC: 6.5 G/DL (ref 6.3–8.2)
PROTHROMBIN TIME: 14.8 SEC (ref 11.4–15.4)
RBC # BLD AUTO: 4.16 10^6/UL (ref 4.35–5.55)
SEGMENTED NEUTROPHILS % (MAN): 94 % (ref 42–78)
SODIUM SERPL-SCNC: 139.5 MMOL/L (ref 137–145)
TOTAL CELLS COUNTED BLD: 100
VARIANT LYMPHS NFR BLD MANUAL: 1 % (ref 13–45)
WBC # BLD AUTO: 8.3 10^3/UL (ref 4–10.5)

## 2019-01-18 PROCEDURE — 71045 X-RAY EXAM CHEST 1 VIEW: CPT

## 2019-01-18 PROCEDURE — 87040 BLOOD CULTURE FOR BACTERIA: CPT

## 2019-01-18 PROCEDURE — 82803 BLOOD GASES ANY COMBINATION: CPT

## 2019-01-18 PROCEDURE — 80053 COMPREHEN METABOLIC PANEL: CPT

## 2019-01-18 PROCEDURE — 87086 URINE CULTURE/COLONY COUNT: CPT

## 2019-01-18 PROCEDURE — 83605 ASSAY OF LACTIC ACID: CPT

## 2019-01-18 PROCEDURE — 81001 URINALYSIS AUTO W/SCOPE: CPT

## 2019-01-18 PROCEDURE — 85025 COMPLETE CBC W/AUTO DIFF WBC: CPT

## 2019-01-18 PROCEDURE — 76380 CAT SCAN FOLLOW-UP STUDY: CPT

## 2019-01-18 PROCEDURE — 85610 PROTHROMBIN TIME: CPT

## 2019-01-18 PROCEDURE — 93005 ELECTROCARDIOGRAM TRACING: CPT

## 2019-01-18 PROCEDURE — 87186 SC STD MICRODIL/AGAR DIL: CPT

## 2019-01-18 PROCEDURE — 93010 ELECTROCARDIOGRAM REPORT: CPT

## 2019-01-18 PROCEDURE — 87088 URINE BACTERIA CULTURE: CPT

## 2019-01-18 PROCEDURE — 36415 COLL VENOUS BLD VENIPUNCTURE: CPT

## 2019-01-18 NOTE — RADIOLOGY REPORT (SQ)
XR CHEST 1 VIEW



HISTORY: Sepsis



COMPARISON: 12/24/2018



FINDINGS:



The heart size is normal. There are unchanged fibrotic changes at

the lung apices. No consolidation is seen. No pleural effusions

or pneumothorax. No acute bony findings.



IMPRESSION:



1.  No evidence of acute cardiopulmonary disease.

2.  Chronic scarring at the lung apices.

## 2019-01-19 VITALS — DIASTOLIC BLOOD PRESSURE: 74 MMHG | SYSTOLIC BLOOD PRESSURE: 116 MMHG

## 2019-01-19 LAB
APPEARANCE UR: (no result)
APTT PPP: (no result) S
BILIRUB UR QL STRIP: NEGATIVE
GLUCOSE UR STRIP-MCNC: NEGATIVE MG/DL
KETONES UR STRIP-MCNC: NEGATIVE MG/DL
NITRITE UR QL STRIP: NEGATIVE
PH UR STRIP: 5 [PH] (ref 5–9)
PROT UR STRIP-MCNC: 100 MG/DL
SP GR UR STRIP: 1.02
UROBILINOGEN UR-MCNC: 4 MG/DL (ref ?–2)

## 2019-01-19 NOTE — RADIOLOGY REPORT (SQ)
EXAM DESCRIPTION:

CT ABDOMEN WITHOUT IV CONTRAST



COMPLETED DATE/TME:  01/19/2019 02:31



CLINICAL HISTORY:

73 years Male, UTI with hematuria, known renal stones



Comparison: None.



Technique:  No contrast.  Coronal and sagittal reformat.  This

exam was performed according to our departmental

dose-optimization program, which includes automated exposure

control, adjustment of the mA and/or kV according to patient size

and/or use of iterative reconstruction technique.[facility]: Dose Right

[facility]: CareDose   [facility]: Dose Right    [facility]: Teradose 4D    [facility]:

[employer]



LIMITATIONS:

None



Findings: 

0.9 x 0.7 x 0.6 cm left mid ureteral stone of 1034 Houns[place]

units at the level of the L4 inferior endplate with mild/moderate

left hydronephrosis/hydroureter. Additional bilateral

nephrolithiasis and/or calcified atherosclerosis measures up to

1.1 cm on the left.



Infrarenal abdominal aortic aneurysm measures 3.0 x 2.6 cm

transverse diameters. Follow-up recommended in three years.

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 Right femoral

arterial stent material. Surgical clips associated with the left

femoral artery. 1.8 cm aneurysmal left femoral artery. Small

wedge-shaped patchy opacity of the right lower lobe at the

posterior sulcus. Lobular contour of the liver could indicate

cirrhosis.

Cholecystectomy.  Coronary arterial calcification/stent.

Atherosclerotic vascular disease.  Old granulomatous disease.

Moderate severe T12 anterior vertebral compression deformity.

Minimal L5 vertebral height loss.  

Unenhanced lower thorax, abdominopelvic structures, and

musculoskeleton appear otherwise grossly unremarkable.



Impression:



1.  A 0.9 cm left mid ureteral stone with low-grade obstruction.

2.  Infrarenal abdominal aortic aneurysm measures 3.0 x 2.6 cm

transverse diameters. Follow-up recommended in three years.

3.  Bilateral femoral arterial aneurysms measure up to 2.2 cm on

the right.

4.  Small right lower lobar atelectasis/pneumonia.

5.  Chronic moderate to severe T12 and mild L5 vertebral

compression deformities.

## 2019-01-19 NOTE — ER DOCUMENT REPORT
ED General





- General


Information source: Patient


TRAVEL OUTSIDE OF THE [place] IN LAST 30 DAYS: No





<CHELLY GEE - Last Filed: 01/19/19 00:41>





<AYAKA PARSON - Last Filed: 01/19/19 07:13>





- General


Chief Complaint: Abdominal Pain


Stated Complaint: WEAKNESS


Time Seen by Provider: 01/19/19 00:07


Notes: 


Patient is a 73 year old male with CAD, COPD presents to the emergency 

department accompanied by son complaining of lower abdominal pain, general 

malaise and weakness onset 1 week ago. Patient states he was recently admitted 

to the hospital on Dec. 20th, 2018 for an acute T5 compression fracture and had 

a subsequent Kyphoplasty. Son at bedside states after discharge from the 

hospital the patient did "bad" for the 1st week and "pretty good" the following 

week. He states for the last 4-5 days, the patient has had decreased appetite, 

general malaise and weakness.  Patient denies any fevers or allergies. 





Patient's PCP is Dr. Omalley. 


 (CHELLY GEE)





- Related Data


Allergies/Adverse Reactions: 


                                        





No Known Allergies Allergy (Verified 09/22/18 12:38)


   











Past Medical History





- General


Information source: Patient, Relative





- Social History


Smoking Status: Former Smoker


Cigarette use (# per day): No


Chew tobacco use (# tins/day): No


Smoking Education Provided: No


Frequency of alcohol use: None


Family History: CAD, COPD, Hypertension


Patient has suicidal ideation: No


Patient has homicidal ideation: No





- Past Medical History


Cardiac Medical History: Reports: Hx Heart Attack


Pulmonary Medical History: Reports: Hx Bronchitis, Hx COPD, Hx Pneumonia


Renal/ Medical History: Reports: Hx Kidney Stones


GI Medical History: Reports: Hx Gastroesophageal Reflux Disease


Musculoskeletal Medical History: Reports Hx Arthritis


Past Surgical History: Reports: Hx Cardiac Catheterization, Hx Cholecystectomy, 

Hx Coronary Stent, Hx Orthopedic Surgery - Kyphoplasty-12/2018, Hx Vascular 

Surgery - Abdominal aortic aneurysm and bilateral femoropopliteal graft





- Immunizations


Hx Pneumococcal Vaccination: 01/01/15





<CHELLY GEE - Last Filed: 01/19/19 00:41>





Review of Systems





- Review of Systems


Constitutional: See HPI, Malaise, Weakness


EENT: No symptoms reported


Cardiovascular: No symptoms reported


Respiratory: No symptoms reported


Gastrointestinal: See HPI, Abdominal pain


Genitourinary: No symptoms reported


Male Genitourinary: No symptoms reported


Musculoskeletal: No symptoms reported


Skin: No symptoms reported


Hematologic/Lymphatic: No symptoms reported


Neurological/Psychological: No symptoms reported


-: Yes All other systems reviewed and negative





<GERARDCHELLY GANN - Last Filed: 01/19/19 00:41>





Physical Exam





<CHELLY GEE - Last Filed: 01/19/19 00:41>





- Vital signs


Vitals: 


                                        











Resp BP


 


 15   105/76 


 


 01/18/19 21:24  01/18/19 21:24














- Notes


Notes: 


GENERAL: Alert, interacts well, a few 100 mL of urine in a urinal at bedside 

which is quite dark in appearance. No acute distress.


HEAD: Normocephalic, atraumatic.


EYES: Pupils equal, round, and reactive to light. Extraocular movements intact.


ENT: Oral mucosa moist, tongue midline. 


NECK: Full range of motion. Supple. Trachea midline.


LUNGS: Clear to auscultation bilaterally, no wheezes, rales, or rhonchi. No 

respiratory distress.


HEART: Regular rate and rhythm. No murmurs, gallops, or rubs. Well healed 

incision consistent with history of triple bypass.


ABDOMEN: Soft, lower abdominal tenderness to palpation. Non-distended. Bowel 

sounds present in all 4 quadrants.


EXTREMITIES: Moves all 4 extremities spontaneously. No edema, radial and 

dorsalis pedis pulses 2/4 bilaterally. No cyanosis.


NEUROLOGICAL: Alert and oriented x3. Normal speech. 


PSYCH: Normal affect, normal mood.


SKIN: Warm, dry, normal turgor. No rashes or lesions noted.





 (GERARDCHELLY)





Course





- Laboratory


Result Diagrams: 


                                 01/18/19 22:20





                                 01/18/19 22:20





<CHELLY GEE - Last Filed: 01/19/19 00:41>





- Laboratory


Result Diagrams: 


                                 01/18/19 22:20





                                 01/18/19 22:20





- Diagnostic Test


Radiology reviewed: Reports reviewed - CT scan shows a 9 x 7 x 6 mm left mid 

ureteral stone at the L4 inferior endplate with mild to moderate left 

hydronephrosis and hydroureter.  There is also a small right lower lobar 

atelectasis or infiltrate.  There is an infrarenal abdominal aortic aneurysm 

measuring 3.0 x 2.6 cm.  There are bilateral femoral artery aneurysms measuring 

up to 2.2 cm on the right.  There is chronic moderate to severe T12 and mild L5 

vertebral compression deformities.





- EKG Interpretation by Me


EKG shows normal: Sinus rhythm, Axis, Intervals, QRS Complexes, ST-T Waves


Rate: Normal - 61


Rhythm: NSR





- Consults


  ** Dr. Keita


Time consulted: 02:28


Consulted provider: other - Requests a CT scan abdomen pelvis to exclude an 

obstructive stone or hydronephrosis prior to admitting to this facility.





  ** Dr. Morrison


Time consulted: 05:38


Consulted provider: other - Will consult on the patient when he is admitted to 

[facility].





- Transfer of Care


Care transferred to following provider: Dr. Weir





<AYAKA PARSON - Last Filed: 01/19/19 07:13>





- Re-evaluation


Re-evalutation: 





01/19/19 03:46


Patient has been shivering for the last hour or more and piling on blankets.  

Difficult to tell if this is due to his temperature going up or due to the cool 

IV fluids.  He was given Tylenol about 30 minutes ago.  At this time the patient

is vomiting and still shivering.  We have been unable to get the CT scan 

requested by the admitting hospitalist due to the patient's shivering.





01/19/19 06:50


After I spoke with Dr. Morrison, I asked the transfer center to page the hospitalist

on call.  I never heard from the hospitalist, however now over an hour later, I 

am told by her  that Dr. Morrison will be the accepting and that Jose in 

the transfer center confirmed that he spoke with all parties involved and that 

the accepting would be the urologist, not the hospitalist.





01/19/19 07:11


I was just informed that transport would not be available until about 12:00 noon

today.


He will be placed on IV maintenance fluid, as he is to remain n.p.o. 

(AYAKA PARSON)





- Vital Signs


Vital signs: 


                                        











Temp Pulse Resp BP Pulse Ox


 


 99.4 F      18   110/79   98 


 


 01/19/19 05:49     01/19/19 00:01  01/19/19 00:00  01/19/19 00:01














- Laboratory


Laboratory results interpreted by me: 


                                        











  01/18/19 01/18/19 01/19/19





  22:20 22:20 00:25


 


RBC  4.16 L  


 


Hgb  13.3 L  


 


RDW  20.1 H  


 


Plt Count  127 L  


 


Seg Neuts % (Manual)  94 H  


 


Lymphocytes % (Manual)  1 L  


 


Abs Lymphs (Manual)  0.1 L  


 


BUN   37 H 


 


Creatinine   1.66 H 


 


Est GFR ( Amer)   49 L 


 


Est GFR (Non-Af Amer)   41 L 


 


Direct Bilirubin   0.6 H 


 


Urine Protein    100 H


 


Urine Blood    SMALL H


 


Urine Urobilinogen    4.0 H


 


Ur Leukocyte Esterase    LARGE H














- Transfer of Care


Notes: 





01/19/19 07:13


Patient is on maintenance IV fluids and is n.p.o.  He is being transported to 

[facility], but transportation will not be available until about 12:00 noon 

today.


 (AYAKA PARSON)





Discharge





<CHELLY GEE - Last Filed: 01/19/19 00:41>





<AYAKA PARSON - Last Filed: 01/19/19 07:13>





- Discharge


Clinical Impression: 


 Dehydration, Ureteral stone with hydronephrosis





Urinary tract infection


Qualifiers:


 Urinary tract infection type: site unspecified Hematuria presence: with 

hematuria Qualified Code(s): N39.0 - Urinary tract infection, site not specified





Acute renal failure


Qualifiers:


 Acute renal failure type: unspecified Qualified Code(s): N17.9 - Acute kidney 

failure, unspecified





Pneumonia


Qualifiers:


 Pneumonia type: due to unspecified organism Laterality: right Lung location: 

lower lobe of lung Qualified Code(s): J18.1 - Lobar pneumonia, unspecified 

organism





Condition: Stable


Disposition: [facility]


Referrals: 


KATELIN OMALLEY DO [Primary Care Provider] - Follow up as needed


Scribe Attestation: 





01/19/19 03:17


I personally performed the services described in the documentation, reviewed and

edited the documentation which was dictated to the scribe in my presence, and it

accurately records my words and actions. (AYAKA PARSON)





Scribe Documentation





- Scribe


Written by Scribe:: Hadley Rodriguez, 1/19/2018 00:40


acting as scribe for :: Shar





<CHELLY GEE - Last Filed: 01/19/19 00:41>

## 2019-02-22 ENCOUNTER — HOSPITAL ENCOUNTER (EMERGENCY)
Dept: HOSPITAL 62 - ER | Age: 74
Discharge: HOME | End: 2019-02-22
Payer: MEDICARE

## 2019-02-22 VITALS — DIASTOLIC BLOOD PRESSURE: 94 MMHG | SYSTOLIC BLOOD PRESSURE: 138 MMHG

## 2019-02-22 DIAGNOSIS — K62.89: ICD-10-CM

## 2019-02-22 DIAGNOSIS — K56.41: Primary | ICD-10-CM

## 2019-02-22 DIAGNOSIS — Z88.6: ICD-10-CM

## 2019-02-22 DIAGNOSIS — Z90.49: ICD-10-CM

## 2019-02-22 DIAGNOSIS — I25.2: ICD-10-CM

## 2019-02-22 DIAGNOSIS — R10.9: ICD-10-CM

## 2019-02-22 DIAGNOSIS — Z87.442: ICD-10-CM

## 2019-02-22 LAB
ABSOLUTE LYMPHOCYTES# (MANUAL): 0.3 10^3/UL (ref 0.5–4.7)
ABSOLUTE MONOCYTES # (MANUAL): 1.1 10^3/UL (ref 0.1–1.4)
ABSOLUTE NEUTROPHILS# (MANUAL): 14.7 10^3/UL (ref 1.7–8.2)
ADD MANUAL DIFF: YES
ALBUMIN SERPL-MCNC: 3.2 G/DL (ref 3.5–5)
ALP SERPL-CCNC: 98 U/L (ref 38–126)
ALT SERPL-CCNC: 19 U/L (ref 21–72)
ANION GAP SERPL CALC-SCNC: 7 MMOL/L (ref 5–19)
ANISOCYTOSIS BLD QL SMEAR: (no result)
APPEARANCE UR: (no result)
APTT BLD: 44.9 SEC (ref 23.5–35.8)
APTT PPP: (no result) S
AST SERPL-CCNC: 23 U/L (ref 17–59)
BASOPHILS NFR BLD MANUAL: 0 % (ref 0–2)
BILIRUB DIRECT SERPL-MCNC: 1.2 MG/DL (ref 0–0.4)
BILIRUB SERPL-MCNC: 3.4 MG/DL (ref 0.2–1.3)
BILIRUB UR QL STRIP: NEGATIVE
BUN SERPL-MCNC: 13 MG/DL (ref 7–20)
CALCIUM: 9.2 MG/DL (ref 8.4–10.2)
CHLORIDE SERPL-SCNC: 100 MMOL/L (ref 98–107)
CO2 SERPL-SCNC: 30 MMOL/L (ref 22–30)
EOSINOPHIL NFR BLD MANUAL: 0 % (ref 0–6)
ERYTHROCYTE [DISTWIDTH] IN BLOOD BY AUTOMATED COUNT: 18.8 % (ref 11.5–14)
GLUCOSE SERPL-MCNC: 107 MG/DL (ref 75–110)
GLUCOSE UR STRIP-MCNC: NEGATIVE MG/DL
HCT VFR BLD CALC: 34.1 % (ref 37.9–51)
HGB BLD-MCNC: 11.4 G/DL (ref 13.5–17)
INR PPP: 2.01
KETONES UR STRIP-MCNC: NEGATIVE MG/DL
MCH RBC QN AUTO: 30.8 PG (ref 27–33.4)
MCHC RBC AUTO-ENTMCNC: 33.5 G/DL (ref 32–36)
MCV RBC AUTO: 92 FL (ref 80–97)
MONOCYTES % (MANUAL): 7 % (ref 3–13)
NITRITE UR QL STRIP: NEGATIVE
PH UR STRIP: 7 [PH] (ref 5–9)
PLATELET # BLD: 215 10^3/UL (ref 150–450)
PLATELET COMMENT: ADEQUATE
POLYCHROMASIA BLD QL SMEAR: SLIGHT
POTASSIUM SERPL-SCNC: 4.1 MMOL/L (ref 3.6–5)
PROT SERPL-MCNC: 6.4 G/DL (ref 6.3–8.2)
PROT UR STRIP-MCNC: 100 MG/DL
PROTHROMBIN TIME: 23.7 SEC (ref 11.4–15.4)
RBC # BLD AUTO: 3.7 10^6/UL (ref 4.35–5.55)
SEGMENTED NEUTROPHILS % (MAN): 91 % (ref 42–78)
SODIUM SERPL-SCNC: 137 MMOL/L (ref 137–145)
SP GR UR STRIP: 1.02
TOTAL CELLS COUNTED BLD: 100
URATE CRY #/AREA URNS HPF: (no result) /HPF
UROBILINOGEN UR-MCNC: 4 MG/DL (ref ?–2)
VARIANT LYMPHS NFR BLD MANUAL: 2 % (ref 13–45)
WBC # BLD AUTO: 16.1 10^3/UL (ref 4–10.5)

## 2019-02-22 PROCEDURE — 81001 URINALYSIS AUTO W/SCOPE: CPT

## 2019-02-22 PROCEDURE — 85610 PROTHROMBIN TIME: CPT

## 2019-02-22 PROCEDURE — 74018 RADEX ABDOMEN 1 VIEW: CPT

## 2019-02-22 PROCEDURE — 80053 COMPREHEN METABOLIC PANEL: CPT

## 2019-02-22 PROCEDURE — 96374 THER/PROPH/DIAG INJ IV PUSH: CPT

## 2019-02-22 PROCEDURE — 85730 THROMBOPLASTIN TIME PARTIAL: CPT

## 2019-02-22 PROCEDURE — 74177 CT ABD & PELVIS W/CONTRAST: CPT

## 2019-02-22 PROCEDURE — 85025 COMPLETE CBC W/AUTO DIFF WBC: CPT

## 2019-02-22 PROCEDURE — 36415 COLL VENOUS BLD VENIPUNCTURE: CPT

## 2019-02-22 PROCEDURE — 99285 EMERGENCY DEPT VISIT HI MDM: CPT

## 2019-02-22 NOTE — ER DOCUMENT REPORT
ED General





- General


Chief Complaint: Constipation


Stated Complaint: RECTAL PAIN


Time Seen by Provider: 02/22/19 01:44


Primary Care Provider: 


KATELIN PICHARDO DO [Primary Care Provider] - Follow up as needed


Mode of Arrival: Medic


Information source: Relative


TRAVEL OUTSIDE OF THE [place] IN LAST 30 DAYS: No





- HPI


Patient complains to provider of: Abdominal pain, rectal pain, constipation


Onset: Other - Past couple of days


Onset/Duration: Persistent


Quality of pain: Sharp


Severity: Severe


Associated symptoms: denies: Chills, Fever


Exacerbated by: Movement, Other - Palpation


Relieved by: Denies


Similar symptoms previously: No


Recently seen / treated by doctor: No


Notes: 





Patient is a 73-year-old  male who comes in tonight via ambulance for 

severe abdominal pain and constipation and rectal pain.  He was just discharged 

yesterday from [facility].  He was originally transferred over there

because of complicated kidney stone.  During his stay there he was stented for 

his kidney stone.  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 

and his left arm.  He was put on anticoagulation in the hospital there.  Toward 

the end of his admission he was complaining of abdominal pain and rectal pain 

and his son feels like they just disregarded it.  He does not have a fever or 

chills.  He is not vomiting.





- Related Data


Allergies/Adverse Reactions: 


                                        





codeine Allergy (Verified 02/22/19 01:25)


   











Past Medical History





- General


Information source: Patient





- Social History


Smoking Status: Unknown if Ever Smoked


Family History: Reviewed & Not Pertinent, CAD, COPD, Hypertension


Patient has suicidal ideation: No


Patient has homicidal ideation: No





- Past Medical History


Cardiac Medical History: Reports: Hx Heart Attack


   Denies: Hx Congestive Heart Failure, Hx Hypertension


Pulmonary Medical History: Reports: Hx Bronchitis, Hx COPD, Hx Pneumonia


   Denies: Hx Asthma, Hx Tuberculosis


Neurological Medical History: Denies: Hx Seizures


Renal/ Medical History: Reports: Hx Kidney Stones.  Denies: Hx Benign Prostati

c Hyperplasia, Hx End Stage Renal Disease, Hx Peritoneal Dialysis


GI Medical History: Reports: Hx Gastroesophageal Reflux Disease.  Denies: Hx 

Cirrhosis, Hx Ulcer


Musculoskeletal Medical History: Reports Hx Arthritis, Denies Hx Multiple 

Sclerosis


Psychiatric Medical History: 


   Denies: Hx Bipolar Disorder, Hx Depression, Hx Schizophrenia


Past Surgical History: Reports: Hx Cardiac Catheterization, Hx Cholecystectomy, 

Hx Coronary Stent, Hx Orthopedic Surgery - Kyphoplasty-12/2018, Hx Vascular 

Surgery - Abdominal aortic aneurysm and bilateral femoropopliteal graft





- Immunizations


Hx Pneumococcal Vaccination: 01/01/15





Review of Systems





- Review of Systems


Notes: 





Constitutional:  No fevers. No chills.





EENT: No eye redness. No eye pain. No ear pain. No sore throat.





Cardiovascular:  No chest pain. No palpitations.





Respiratory: No cough. No shortness of breath. No respiratory distress.





Gastrointestinal: Abdominal pain and constipation.  No nausea vomiting or 

diarrhea





Genitourinary: Atraumatic. No lesions. No pain. No discharge.





Musculoskeletal: Atraumatic. No swelling. No deformities.





Skin: Positive for extensive bruising





Lymphatic: No swollen lymph nodes.





Neurologic: No headache. No syncope.





Psychiatric: No suicidal or homicidal ideation.





Physical Exam





- Vital signs


Vitals: 


                                        











Temp Pulse Resp BP Pulse Ox


 


 97.7 F   76   22 H  131/80 H  94 


 


 02/22/19 01:13  02/22/19 01:13  02/22/19 01:13  02/22/19 01:13  02/22/19 01:13














- Notes


Notes: 





General: Pale, ill-appearing





Cardiac: Well-perfused. Regular rate and rhythm. No murmurs, rubs, or gallops. 





Pulmonary: No respiratory distress. No cyanosis. Bilateral lung fiels are clear 

to auscultation.





Abdominal: Abdomen is diffusely tender to palpate.  Guarding is present.  No 

rebound tenderness is present.  Bowel sounds are present in all 4 quadrants.  

Rectal exam reveals good rectal tone.  There is a large amount of soft stool in 

the rectum.  Guaiac is positive





HEENT: Head is atraumatic. Conjunctivae not reddened. No tearing. PERRL. EOMI. 

Orbits atraumatic. No periorbital swelling or erythema. Oropharynx is without 

erythema, swelling, or exudates.





Neck: Supple. No adenopathy. No meningismus.





Dermatologic: Extensive ecchymosis down the left side of the back and flank.  

Also left buttock





Chest: Atraumatic. No chest wall tenderness to palpation.





Musculoskeletal: Moves all extremities well. No range of motion deficits. no 

muscular or joint tenderness. No paraspinal muscle tenderness. no midline spinal

tenderness or step-off.





Genitourinary: Examination deferred





Neurologic: No gross neurologic deficits.





Psychiatric: Normal mood. 











Course





- Re-evaluation


Re-evalutation: 





02/22/19 06:30


Patient's hemoglobin is actually quite good.  It is consistent with his previous

trends at this facility.  The CT scan shows the stent and the existing kidney 

stone.  It also shows some fecal impaction and constipation.  I personally 

digitally partially disimpacted the patient.  I was able to get out quite a bit 

of thick stool consistency of keshav.  Unfortunately he was experiencing 

discomfort so at his request I stopped.  An enema was attempted which was not 

successful.  We gave the patient a break and then we came back.  Digital 

disimpaction was attempted again.  This time the patient insisted that we not 

continue with any kind of intervention.  We gave the patient multiple chances to

change his mind and he remains very clear that he does not want any further 

disimpaction or enemas here.  I had a discussion with his son.  It sounds like 

the patient has home health and physical therapy all ordered up since his 

discharge from [facility].  He also has a really good primary care doctor who

is very attentive.  We discussed getting him back on stool softeners, Maalox, 

magnesium citrate among other remedies to help with his constipation.  His 

urinalysis shows blood which is consistent with his kidney stone and stent.  No 

signs of infection there.  Patient's vital signs are quite stable.  We will 

discharge patient home and have him follow-up with his primary care doctor today

or tomorrow.  Perhaps more could be ordered to be done for the patient's 

constipation as an outpatient in his home.  We will go ahead and discharge him 

home at his request.





- Vital Signs


Vital signs: 


                                        











Temp Pulse Resp BP Pulse Ox


 


 97.7 F   76   15   116/80   96 


 


 02/22/19 01:13  02/22/19 01:13  02/22/19 06:00  02/22/19 06:00  02/22/19 06:00














- Laboratory


Result Diagrams: 


                                 02/22/19 02:40





                                 02/22/19 02:40


Laboratory results interpreted by me: 


                                        











  02/22/19 02/22/19 02/22/19





  02:40 02:40 02:40


 


WBC  16.1 H  


 


RBC  3.70 L  


 


Hgb  11.4 L  


 


Hct  34.1 L  


 


RDW  18.8 H  


 


Seg Neuts % (Manual)  91 H  


 


Lymphocytes % (Manual)  2 L  


 


Abs Neuts (Manual)  14.7 H  


 


Abs Lymphs (Manual)  0.3 L  


 


PT    23.7 H


 


APTT    44.9 H


 


Total Bilirubin   3.4 H 


 


Direct Bilirubin   1.2 H 


 


ALT   19 L 


 


Albumin   3.2 L 


 


Urine Protein   


 


Urine Blood   


 


Urine Urobilinogen   


 


Ur Leukocyte Esterase   














  02/22/19





  05:00


 


WBC 


 


RBC 


 


Hgb 


 


Hct 


 


RDW 


 


Seg Neuts % (Manual) 


 


Lymphocytes % (Manual) 


 


Abs Neuts (Manual) 


 


Abs Lymphs (Manual) 


 


PT 


 


APTT 


 


Total Bilirubin 


 


Direct Bilirubin 


 


ALT 


 


Albumin 


 


Urine Protein  100 H


 


Urine Blood  LARGE H


 


Urine Urobilinogen  4.0 H


 


Ur Leukocyte Esterase  MODERATE H














- Diagnostic Test


Radiology reviewed: Reports reviewed





Discharge





- Discharge


Clinical Impression: 


 Fecal impaction





Constipation


Qualifiers:


 Constipation type: unspecified constipation type Qualified Code(s): K59.00 - 

Constipation, unspecified





Disposition: HOME, SELF-CARE


Instructions:  Constipation ([facility]), Fecal Impaction ([facility]), Laxative ([facility])


Additional Instructions: 


Please start taking the MiraLAX which was prescribed.  He would also benefit 

from other constipation medications such as stool softeners, Maalox, magnesium 

citrate, glycerin suppositories, etc.  The narcotic pain medication is the cause

of your constipation.  Unfortunately until your pain is improved without 

narcotic medication, you will continue to have difficulties moving your bowels. 

Please follow-up with your primary care doctor today or tomorrow to see if there

are any other services which can be ordered at home by her primary care 

physician.


Prescriptions: 


Polyethylene Glycol 3350 [Miralax] 119 gm PO DAILY #1 bottle


Referrals: 


KATELIN PICHARDO DO [Primary Care Provider] - Follow up tomorrow

## 2019-02-22 NOTE — RADIOLOGY REPORT (SQ)
EXAM DESCRIPTION: 



CT ABDOMEN PELVIS WITH IV CONTRAST



COMPLETED DATE/TME:  02/22/2019 03:11



CLINICAL HISTORY: 



73 years, Male, ABDOMINAL PAIN, BLOOD IN STOOLS



COMPARISON:

1/19/2019 CT



TECHNIQUE:

No  Images stored on PACS.

 

All CT scanners at this facility use dose modulation, iterative

reconstruction, and/or weight based dosing when appropriate to

reduce radiation dose to as low as reasonably achievable (ALARA).





[facility]: Dose Right [facility]: CareDose   [facility]: Dose Right    [facility]:

Teradose 4D    [facility]: Smart Technologies



LIMITATIONS:

None.



FINDINGS:



Limited evaluation of the lung bases shows reticulonodular

changes within each lung base consistent with pneumonitis.

Osseous structures are grossly intact. However, there is a T12

compression fracture deformity, similar to the prior. The liver,

spleen, adrenal glands, pancreas are unremarkable. Status post

cholecystectomy. Stable atheromatous change of the abdominal

aorta. Rather extensive mural thrombus formation of the distal

abdominal aorta, which has maximal diameter 2.5 cm AP by 2.5 cm

transverse. There is a left ureteral stent in place.

Nonobstructing 7.6 mm left renal calculus. There is an

approximately 6 mm calculus in the proximal to mid left ureter.

Large amount of stool in the colon and rectal vault with fecal

impaction. Severe atheromatous change of the femoral arteries

bilaterally with postsurgical change, as before.





IMPRESSION:



Left ureteral stent in place. Nonobstructing 7.6 mm left renal

calculus. 6 mm calculus within the proximal to mid left ureter.



Fecal impaction. Large amount of stool in the colon.



Bibasilar pneumonitis



Severe atheromatous changes. Stable mild infrarenal abdominal

aortic aneurysm with rather extensive mural thrombus formation.



AAA Size: Follow-up Recommendation (1):

2.6 - 2.9 cm   Every 5 years (2)

3.0 - 3.4 cm   Every 3 years

3.5 - 3.9 cm   Every 12 months

4.0 - 4.4 cm   Every 12 months, vasc consult rec

4.5 - 5.4 cm   Every 6 months, vasc consult rec

>=5.5 cm   Referral to vascular surgeon recommended

______________________________________________________

(1)Based upon the Society for Vascular Surgery Guidelines:

 J Vasc Surg. 2009 Oct;50(4 Suppl):S2-49 

(2)For aortas of max dain of 2.6-2.9 cm that meet criteria for AAA



(>= 1.5 x proximal normal segment)

 

TECHNICAL DOCUMENTATION:



Quality ID # 436: Final reports with documentation of one or more

dose reduction techniques (e.g., Automated exposure control,

adjustment of the mA and/or kV according to patient size, use of

iterative reconstruction technique)



copyright 2011 [employer]- All Rights Reserved

## 2019-02-22 NOTE — RADIOLOGY REPORT (SQ)
EXAM DESCRIPTION: 



XR ABDOMEN 1 VIEW (KUB)



COMPLETED DATE/TME:  02/22/2019 02:16



CLINICAL HISTORY: 



73 years, Male, constipation



COMPARISON:

None.



NUMBER OF VIEWS:

1



TECHNIQUE:

AP abdomen



LIMITATIONS:

None.



FINDINGS:



Evaluation for free air limited on a supine view. Osteopenia with

vascular calcifications. Surgical clips in the right upper

quadrant and left inguinal region. Endovascular stent graft

projects in the right femoral region. Left ureteral stent in

place. Approximately 6.9 mm calculus projects over the course of

the proximal left ureter. Large amount of stool and bowel gas.

Questionable left renal calculus measuring 7.7 mm.



IMPRESSION:



6.9 mm proximal left ureteral calculus. Left ureteral stent in

place. Possible left renal calculus. Large amount of stool and

bowel gas

 



copyright 2011 [employer]- All Rights Reserved

## 2019-02-23 ENCOUNTER — HOSPITAL ENCOUNTER (INPATIENT)
Dept: HOSPITAL 62 - ER | Age: 74
LOS: 19 days | Discharge: ANOTHER HOSPITAL | DRG: 871 | End: 2019-03-14
Attending: INTERNAL MEDICINE | Admitting: INTERNAL MEDICINE
Payer: MEDICARE

## 2019-02-23 DIAGNOSIS — J96.01: ICD-10-CM

## 2019-02-23 DIAGNOSIS — Z82.61: ICD-10-CM

## 2019-02-23 DIAGNOSIS — R19.5: ICD-10-CM

## 2019-02-23 DIAGNOSIS — Z79.51: ICD-10-CM
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
## 2019-02-23 DIAGNOSIS — I25.2: ICD-10-CM

## 2019-02-23 DIAGNOSIS — J69.0: ICD-10-CM

## 2019-02-23 DIAGNOSIS — Z87.891: ICD-10-CM

## 2019-02-23 DIAGNOSIS — Z95.820: ICD-10-CM

## 2019-02-23 DIAGNOSIS — Z82.49: ICD-10-CM

## 2019-02-23 DIAGNOSIS — N30.01: ICD-10-CM

## 2019-02-23 DIAGNOSIS — Z87.440: ICD-10-CM

## 2019-02-23 DIAGNOSIS — R65.21: ICD-10-CM

## 2019-02-23 DIAGNOSIS — I73.9: ICD-10-CM

## 2019-02-23 DIAGNOSIS — Z83.6: ICD-10-CM

## 2019-02-23 DIAGNOSIS — E87.0: ICD-10-CM

## 2019-02-23 DIAGNOSIS — M06.9: ICD-10-CM

## 2019-02-23 DIAGNOSIS — H91.90: ICD-10-CM

## 2019-02-23 DIAGNOSIS — N40.1: ICD-10-CM

## 2019-02-23 DIAGNOSIS — M48.54XA: ICD-10-CM

## 2019-02-23 DIAGNOSIS — M85.88: ICD-10-CM

## 2019-02-23 DIAGNOSIS — A41.51: Primary | ICD-10-CM

## 2019-02-23 DIAGNOSIS — N20.2: ICD-10-CM

## 2019-02-23 DIAGNOSIS — D61.811: ICD-10-CM

## 2019-02-23 DIAGNOSIS — Z88.6: ICD-10-CM

## 2019-02-23 DIAGNOSIS — I48.0: ICD-10-CM

## 2019-02-23 DIAGNOSIS — Z95.5: ICD-10-CM

## 2019-02-23 DIAGNOSIS — E87.6: ICD-10-CM

## 2019-02-23 DIAGNOSIS — G93.41: ICD-10-CM

## 2019-02-23 DIAGNOSIS — K21.9: ICD-10-CM

## 2019-02-23 DIAGNOSIS — D69.6: ICD-10-CM

## 2019-02-23 DIAGNOSIS — J44.9: ICD-10-CM

## 2019-02-23 LAB
ABSOLUTE LYMPHOCYTES# (MANUAL): 0.9 10^3/UL (ref 0.5–4.7)
ABSOLUTE MONOCYTES # (MANUAL): 1.4 10^3/UL (ref 0.1–1.4)
ABSOLUTE NEUTROPHILS# (MANUAL): 15.5 10^3/UL (ref 1.7–8.2)
ADD MANUAL DIFF: YES
ALBUMIN SERPL-MCNC: 3.7 G/DL (ref 3.5–5)
ALP SERPL-CCNC: 114 U/L (ref 38–126)
ALT SERPL-CCNC: 26 U/L (ref 21–72)
ANION GAP SERPL CALC-SCNC: 13 MMOL/L (ref 5–19)
ANISOCYTOSIS BLD QL SMEAR: (no result)
APPEARANCE UR: (no result)
APTT BLD: 35 SEC (ref 23.5–35.8)
APTT PPP: (no result) S
AST SERPL-CCNC: 28 U/L (ref 17–59)
BARBITURATES UR QL SCN: NEGATIVE
BASOPHILS NFR BLD MANUAL: 0 % (ref 0–2)
BILIRUB DIRECT SERPL-MCNC: 1.4 MG/DL (ref 0–0.4)
BILIRUB SERPL-MCNC: 3.8 MG/DL (ref 0.2–1.3)
BILIRUB UR QL STRIP: NEGATIVE
BUN SERPL-MCNC: 12 MG/DL (ref 7–20)
CALCIUM: 9.9 MG/DL (ref 8.4–10.2)
CHLORIDE SERPL-SCNC: 104 MMOL/L (ref 98–107)
CO2 SERPL-SCNC: 25 MMOL/L (ref 22–30)
EOSINOPHIL NFR BLD MANUAL: 1 % (ref 0–6)
ERYTHROCYTE [DISTWIDTH] IN BLOOD BY AUTOMATED COUNT: 18.7 % (ref 11.5–14)
GLUCOSE SERPL-MCNC: 105 MG/DL (ref 75–110)
GLUCOSE UR STRIP-MCNC: NEGATIVE MG/DL
HCT VFR BLD CALC: 39.1 % (ref 37.9–51)
HGB BLD-MCNC: 13 G/DL (ref 13.5–17)
INR PPP: 1.66
KETONES UR STRIP-MCNC: (no result) MG/DL
MCH RBC QN AUTO: 30.7 PG (ref 27–33.4)
MCHC RBC AUTO-ENTMCNC: 33.2 G/DL (ref 32–36)
MCV RBC AUTO: 92 FL (ref 80–97)
METHADONE UR QL SCN: NEGATIVE
MONOCYTES % (MANUAL): 8 % (ref 3–13)
NITRITE UR QL STRIP: NEGATIVE
PCP UR QL SCN: NEGATIVE
PH UR STRIP: 7 [PH] (ref 5–9)
PLATELET # BLD: 263 10^3/UL (ref 150–450)
PLATELET CLUMP BLD QL SMEAR: PRESENT
PLATELET COMMENT: ADEQUATE
POLYCHROMASIA BLD QL SMEAR: (no result)
POTASSIUM SERPL-SCNC: 4.5 MMOL/L (ref 3.6–5)
PROT SERPL-MCNC: 7.3 G/DL (ref 6.3–8.2)
PROT UR STRIP-MCNC: 100 MG/DL
PROTHROMBIN TIME: 20.4 SEC (ref 11.4–15.4)
RBC # BLD AUTO: 4.23 10^6/UL (ref 4.35–5.55)
SEGMENTED NEUTROPHILS % (MAN): 86 % (ref 42–78)
SODIUM SERPL-SCNC: 141.6 MMOL/L (ref 137–145)
SP GR UR STRIP: 1.02
TOTAL CELLS COUNTED BLD: 100
TOXIC GRANULES BLD QL SMEAR: (no result)
URINE AMPHETAMINES SCREEN: NEGATIVE
URINE BENZODIAZEPINES SCREEN: NEGATIVE
URINE COCAINE SCREEN: NEGATIVE
URINE MARIJUANA (THC) SCREEN: NEGATIVE
UROBILINOGEN UR-MCNC: 4 MG/DL (ref ?–2)
VARIANT LYMPHS NFR BLD MANUAL: 5 % (ref 13–45)
WBC # BLD AUTO: 18 10^3/UL (ref 4–10.5)
WBC TOXIC VACUOLES BLD QL SMEAR: PRESENT

## 2019-02-23 PROCEDURE — 93005 ELECTROCARDIOGRAM TRACING: CPT

## 2019-02-23 PROCEDURE — 84443 ASSAY THYROID STIM HORMONE: CPT

## 2019-02-23 PROCEDURE — 36569 INSJ PICC 5 YR+ W/O IMAGING: CPT

## 2019-02-23 PROCEDURE — 82803 BLOOD GASES ANY COMBINATION: CPT

## 2019-02-23 PROCEDURE — 85025 COMPLETE CBC W/AUTO DIFF WBC: CPT

## 2019-02-23 PROCEDURE — 74018 RADEX ABDOMEN 1 VIEW: CPT

## 2019-02-23 PROCEDURE — 94668 MNPJ CHEST WALL SBSQ: CPT

## 2019-02-23 PROCEDURE — 82040 ASSAY OF SERUM ALBUMIN: CPT

## 2019-02-23 PROCEDURE — 80069 RENAL FUNCTION PANEL: CPT

## 2019-02-23 PROCEDURE — 83735 ASSAY OF MAGNESIUM: CPT

## 2019-02-23 PROCEDURE — 94640 AIRWAY INHALATION TREATMENT: CPT

## 2019-02-23 PROCEDURE — 87088 URINE BACTERIA CULTURE: CPT

## 2019-02-23 PROCEDURE — 85384 FIBRINOGEN ACTIVITY: CPT

## 2019-02-23 PROCEDURE — 80053 COMPREHEN METABOLIC PANEL: CPT

## 2019-02-23 PROCEDURE — 82272 OCCULT BLD FECES 1-3 TESTS: CPT

## 2019-02-23 PROCEDURE — 82607 VITAMIN B-12: CPT

## 2019-02-23 PROCEDURE — 87086 URINE CULTURE/COLONY COUNT: CPT

## 2019-02-23 PROCEDURE — 77001 FLUOROGUIDE FOR VEIN DEVICE: CPT

## 2019-02-23 PROCEDURE — 82962 GLUCOSE BLOOD TEST: CPT

## 2019-02-23 PROCEDURE — 82140 ASSAY OF AMMONIA: CPT

## 2019-02-23 PROCEDURE — 76937 US GUIDE VASCULAR ACCESS: CPT

## 2019-02-23 PROCEDURE — 83605 ASSAY OF LACTIC ACID: CPT

## 2019-02-23 PROCEDURE — 96375 TX/PRO/DX INJ NEW DRUG ADDON: CPT

## 2019-02-23 PROCEDURE — 99291 CRITICAL CARE FIRST HOUR: CPT

## 2019-02-23 PROCEDURE — 85027 COMPLETE CBC AUTOMATED: CPT

## 2019-02-23 PROCEDURE — 74177 CT ABD & PELVIS W/CONTRAST: CPT

## 2019-02-23 PROCEDURE — 94660 CPAP INITIATION&MGMT: CPT

## 2019-02-23 PROCEDURE — 81001 URINALYSIS AUTO W/SCOPE: CPT

## 2019-02-23 PROCEDURE — 74176 CT ABD & PELVIS W/O CONTRAST: CPT

## 2019-02-23 PROCEDURE — B4155 EF INCOMPLETE/MODULAR: HCPCS

## 2019-02-23 PROCEDURE — 70450 CT HEAD/BRAIN W/O DYE: CPT

## 2019-02-23 PROCEDURE — 71250 CT THORAX DX C-: CPT

## 2019-02-23 PROCEDURE — 36415 COLL VENOUS BLD VENIPUNCTURE: CPT

## 2019-02-23 PROCEDURE — S0164 INJECTION PANTROPRAZOLE: HCPCS

## 2019-02-23 PROCEDURE — 84484 ASSAY OF TROPONIN QUANT: CPT

## 2019-02-23 PROCEDURE — 80048 BASIC METABOLIC PNL TOTAL CA: CPT

## 2019-02-23 PROCEDURE — 82550 ASSAY OF CK (CPK): CPT

## 2019-02-23 PROCEDURE — S0028 INJECTION, FAMOTIDINE, 20 MG: HCPCS

## 2019-02-23 PROCEDURE — 36600 WITHDRAWAL OF ARTERIAL BLOOD: CPT

## 2019-02-23 PROCEDURE — 96365 THER/PROPH/DIAG IV INF INIT: CPT

## 2019-02-23 PROCEDURE — 96361 HYDRATE IV INFUSION ADD-ON: CPT

## 2019-02-23 PROCEDURE — 80202 ASSAY OF VANCOMYCIN: CPT

## 2019-02-23 PROCEDURE — 80307 DRUG TEST PRSMV CHEM ANLYZR: CPT

## 2019-02-23 PROCEDURE — 85730 THROMBOPLASTIN TIME PARTIAL: CPT

## 2019-02-23 PROCEDURE — 87186 SC STD MICRODIL/AGAR DIL: CPT

## 2019-02-23 PROCEDURE — 94799 UNLISTED PULMONARY SVC/PX: CPT

## 2019-02-23 PROCEDURE — 87040 BLOOD CULTURE FOR BACTERIA: CPT

## 2019-02-23 PROCEDURE — 85610 PROTHROMBIN TIME: CPT

## 2019-02-23 PROCEDURE — 93010 ELECTROCARDIOGRAM REPORT: CPT

## 2019-02-23 PROCEDURE — 71045 X-RAY EXAM CHEST 1 VIEW: CPT

## 2019-02-23 PROCEDURE — C1758 CATHETER, URETERAL: HCPCS

## 2019-02-23 RX ADMIN — SODIUM CHLORIDE PRN MLS/HR: 9 INJECTION, SOLUTION INTRAVENOUS at 17:32

## 2019-02-23 RX ADMIN — PIPERACILLIN AND TAZOBACTAM SCH GM: 3; .375 INJECTION, POWDER, LYOPHILIZED, FOR SOLUTION INTRAVENOUS; PARENTERAL at 18:32

## 2019-02-23 RX ADMIN — MORPHINE SULFATE SCH MG: 10 INJECTION INTRAMUSCULAR; INTRAVENOUS; SUBCUTANEOUS at 20:17

## 2019-02-23 RX ADMIN — FAMOTIDINE SCH MG: 10 INJECTION INTRAVENOUS at 23:39

## 2019-02-23 RX ADMIN — MORPHINE SULFATE SCH MG: 10 INJECTION INTRAMUSCULAR; INTRAVENOUS; SUBCUTANEOUS at 15:51

## 2019-02-23 RX ADMIN — WARFARIN SODIUM SCH MG: 1 TABLET ORAL at 23:40

## 2019-02-23 NOTE — ER DOCUMENT REPORT
ED General





- General


Mode of Arrival: Medic


Information source: Relative - Son


TRAVEL OUTSIDE OF THE [place] IN LAST 30 DAYS: No





<MO MEAD - Last Filed: 02/23/19 15:43>





<ZACK VERA - Last Filed: 02/23/19 18:19>





- General


Chief Complaint: Altered Mental Status


Stated Complaint: POSSIBLE SEPSIS


Time Seen by Provider: 02/23/19 09:20


Notes: 





Patient is a 73-year-old male who presents the emergency department with chief 

complaint of altered mental status.  Patient's son reports patient is normally 

alert and oriented.  He was seen in this emergency department yesterday for 

abdominal pain and discharged with a diagnosis of constipation.  Patient's son 

reports patient has been recently admitted to [facility] for recurrent 

urinary tract infections with kidney stones.  At approximately 8:00 last night 

patient began acting confused and altered.  Patient's son states this has 

progressed through the entire night.  He states he has not taken any of his 

medications since Thursday.  He did have a normal bowel movement this morning at

approximately 6 AM.  Patient's son denies any recent fever or chills.  Patient 

finished a course of Keflex on Thursday.





 (MO MEAD)





- Related Data


Allergies/Adverse Reactions: 


                                        





codeine Allergy (Verified 02/22/19 01:25)


   











Past Medical History





- General


Information source: Patient





- Social History


Smoking Status: Unknown if Ever Smoked


Family History: Reviewed & Not Pertinent, CAD, COPD, Hypertension


Patient has suicidal ideation: No


Patient has homicidal ideation: No





- Past Medical History


Cardiac Medical History: Reports: Hx Heart Attack


   Denies: Hx Congestive Heart Failure, Hx Hypertension


Pulmonary Medical History: Reports: Hx Bronchitis, Hx COPD, Hx Pneumonia


   Denies: Hx Asthma, Hx Tuberculosis


Neurological Medical History: Denies: Hx Seizures


Renal/ Medical History: Reports: Hx Kidney Stones.  Denies: Hx Benign 

Prostatic Hyperplasia, Hx End Stage Renal Disease, Hx Peritoneal Dialysis


GI Medical History: Reports: Hx Gastroesophageal Reflux Disease.  Denies: Hx 

Cirrhosis, Hx Ulcer


Musculoskeletal Medical History: Reports Hx Arthritis, Denies Hx Multiple 

Sclerosis


Psychiatric Medical History: 


   Denies: Hx Bipolar Disorder, Hx Depression, Hx Schizophrenia


Past Surgical History: Reports: Hx Cardiac Catheterization, Hx Cholecystectomy, 

Hx Coronary Stent, Hx Orthopedic Surgery - Kyphoplasty-12/2018, Hx Vascular 

Surgery - Abdominal aortic aneurysm and bilateral femoropopliteal graft





- Immunizations


Hx Pneumococcal Vaccination: 01/01/15





<MO MEAD - Last Filed: 02/23/19 15:43>





Review of Systems





- Review of Systems


Constitutional: denies: Chills, Fever


EENT: No symptoms reported


Cardiovascular: No symptoms reported


Respiratory: Cough


Gastrointestinal: denies: Diarrhea, Nausea, Vomiting


Genitourinary: Hematuria


Male Genitourinary: No symptoms reported


Musculoskeletal: No symptoms reported


Skin: No symptoms reported


Hematologic/Lymphatic: No symptoms reported


Neurological/Psychological: Confusion, Hallucinations





<MO MEAD - Last Filed: 02/23/19 15:43>





Physical Exam





<MO MEAD - Last Filed: 02/23/19 15:43>





- Vital signs


Vitals: 





                                        











Resp


 


 23 H


 


 02/23/19 08:19














- Notes


Notes: 





PHYSICAL EXAMINATION:





GENERAL: Disheveled.





HEAD: Atraumatic, normocephalic.





EYES: Pupils equal round and reactive to light, extraocular movements intact, sc

estrellita anicteric, conjunctiva are normal.





ENT: Nares patent, oropharynx clear without exudates.  Moist mucous membranes.





NECK: Normal range of motion, supple without lymphadenopathy





LUNGS: Breath sounds clear to auscultation bilaterally and equal.  No wheezes 

rales or rhonchi.





HEART: Regular rate and rhythm without murmurs





ABDOMEN: Soft, nontender, nondistended abdomen.  No guarding, no rebound.  No 

masses appreciated.





Musculoskeletal: Normal range of motion, no pitting or edema.  No cyanosis.





NEUROLOGICAL: Cranial nerves grossly intact.  Normal sensory, motor exams 





PSYCH: Normal mood, normal affect.





SKIN: Warm, Dry, normal turgor, no rashes or lesions noted.  Ecchymosis noted to

left upper arm.  Ecchymosis noted to left flank from axilla to hip. 

(MO MEAD)





Course





- Laboratory


Result Diagrams: 


                                 02/23/19 08:55





                                 02/23/19 08:55





<MO MEAD - Last Filed: 02/23/19 15:43>





- Laboratory


Result Diagrams: 


                                 02/23/19 08:55





                                 02/23/19 08:55





- Diagnostic Test


Radiology reviewed: Image reviewed, Reports reviewed





<ZACK VERA - Last Filed: 02/23/19 18:19>





- Re-evaluation


Re-evalutation: 





Patient is altered, unable to appropriately answer questions.  Patient does open

up his eyes and speak out however he is not making any sense.  Patient's son who

is at bedside states that patient is normally alert and oriented. 





Labs as recorded.  Patient does have leukocytosis with white blood count of 18. 

Lactic acid is elevated at 3.1.  Total and indirect bilirubin are also elevated 

slightly increased from yesterday.  Urinalysis with small leukocyte esterase, 

does not appear to be infected in comparison with recent urinalysis is.  Patient

did recently finish Keflex with his last dose on Thursday.  Head CT and chest x-

ray are both negative for any acute findings.





02/23/19 13:47


Call placed to [facility] urology for consult versus patient transfer per the

request of our hospitalist Dr. Martin as patient was recently discharged from 

their facility after having a ureteral stent placed and we do not have urology 

on-call here today.





02/23/19 13:57


Spoke with Dr. Lopes, urologist with [facility].  He reviewed 

patient's case as well as CT scan from yesterday.  Dr. Lopes does not feel that 

patient needs to be transferred to [facility] as he does not need any urology

intervention.





02/23/19 14:20


Called and spoke with Dr. Martin regarding patient admission.  He will contact 

the patient's son and update me.





02/23/19 14:44


Dr. Martin spoke with patient's son and states that patient's son wants patient

transferred to [facility].





02/23/19 14:52


Contact info for Son is Hemal Bowden, [phone].  Called and left a message 

for him.





02/23/19 15:36


Patient's son Hemal Bowden called back and states that he personally contacted 

[facility] and spoke with Dr. Lopes.  He states that to Dr. Lopes told him 

that they will not to do any intervention with the ureteral stent and that he 

does not feel this infection is related to any urological source.  Patient's son

states that he now wants the patient to stay here at [facility] if we are capable of

treating him.





02/23/19 15:37


Called and spoke with hospitalist Dr. Martin who agrees to admit the patient to

his service. (MO MEAD)





- Vital Signs


Vital signs: 





                                        











Temp Pulse Resp BP Pulse Ox


 


 97.9 F   93   16   132/86 H  96 


 


 02/23/19 17:19  02/23/19 17:19  02/23/19 17:19  02/23/19 17:19  02/23/19 17:19














- Laboratory


Laboratory results interpreted by me: 





                                        











  02/23/19 02/23/19 02/23/19





  08:55 08:55 08:55


 


WBC   18.0 H 


 


RBC   4.23 L 


 


Hgb   13.0 L 


 


RDW   18.7 H 


 


Seg Neuts % (Manual)   86 H 


 


Lymphocytes % (Manual)   5 L 


 


Abs Neuts (Manual)   15.5 H 


 


Lactic Acid  3.1 H  


 


Total Bilirubin    3.8 H


 


Direct Bilirubin    1.4 H


 


Ammonia   


 


Urine Protein   


 


Urine Ketones   


 


Urine Blood   


 


Urine Urobilinogen   


 


Ur Leukocyte Esterase   














  02/23/19 02/23/19 02/23/19





  10:10 12:00 12:00


 


WBC   


 


RBC   


 


Hgb   


 


RDW   


 


Seg Neuts % (Manual)   


 


Lymphocytes % (Manual)   


 


Abs Neuts (Manual)   


 


Lactic Acid    2.2 H


 


Total Bilirubin   


 


Direct Bilirubin   


 


Ammonia   < 8.7 L 


 


Urine Protein  100 H  


 


Urine Ketones  TRACE H  


 


Urine Blood  LARGE H  


 


Urine Urobilinogen  4.0 H  


 


Ur Leukocyte Esterase  SMALL H  














Critical Care Note





- Critical Care Note


Total time excluding time spent on procedures (mins): 35 - Multiple re-

evaluations of patient.





<MO EMAD - Last Filed: 02/23/19 15:43>





- Critical Care Note


Total time excluding time spent on procedures (mins): 60





<ZACK VERA - Last Filed: 02/23/19 18:19>





Discharge





- Discharge


Admitting Provider: Hospitalist


Unit Admitted: IMCU





<MO MEAD - Last Filed: 02/23/19 15:43>





<ZACK VERA - Last Filed: 02/23/19 18:19>





- Discharge


Clinical Impression: 


Altered mental status


Qualifiers:


 Altered mental status type: unspecified Qualified Code(s): R41.82 - Altered 

mental status, unspecified





Sepsis


Qualifiers:


 Sepsis type: sepsis due to unspecified organism Qualified Code(s): A41.9 - 

Sepsis, unspecified organism





Leukocytosis


Qualifiers:


 Leukocytosis type: unspecified Qualified Code(s): D72.829 - Elevated white 

blood cell count, unspecified





Condition: Fair


Disposition: ADMITTED AS INPATIENT

## 2019-02-23 NOTE — RADIOLOGY REPORT (SQ)
EXAM DESCRIPTION:  CT HEAD WITHOUT



COMPLETED DATE/TIME:  2/23/2019 12:15 pm



REASON FOR STUDY:  confusion



COMPARISON:  None.



TECHNIQUE:  Axial images acquired through the brain without intravenous contrast.  Images reviewed wi
th bone, brain and subdural windows.  Additional sagittal and coronal reconstructions were generated.
 Images stored on PACS.

All CT scanners at this facility use dose modulation, iterative reconstruction, and/or weight based d
osing when appropriate to reduce radiation dose to as low as reasonably achievable (ALARA).

[facility]: Dose Right  [facility]: CareDose    [facility]: Dose Right    [facility]: Teradose 4D    [facility]: Smart Technologies



RADIATION DOSE:  CT Rad equipment meets quality standard of care and radiation dose reduction techniq
ues were employed. CTDIvol: 53.2 mGy. DLP: 1017 mGy-cm.mGy.



LIMITATIONS:  Motion.



FINDINGS:  VENTRICLES: Prominent.

CEREBRUM: No masses.  No hemorrhage.  No midline shift.  Areas of low density in the white matter mos
t likely due to chronic micro-vascular ischemic change.  No evidence for acute infarction.

CEREBELLUM: No masses.  No hemorrhage.  No alteration of density.  No evidence for acute infarction.

EXTRAAXIAL SPACES: Age-related involutional change.  No fluid collections.  No masses.

ORBITS AND GLOBE: No intra- or extraconal masses.  Normal contour of globe without masses.

CALVARIUM: No fracture.

PARANASAL SINUSES: No fluid or mucosal thickening.

SOFT TISSUES: No mass or hematoma.

OTHER: No other significant finding.



IMPRESSION:  CHRONIC CHANGES OF ATROPHY AND MICROVASCULAR ISCHEMIA.  NO ACUTE PROCESS.

EVIDENCE OF ACUTE STROKE: NO.



TECHNICAL DOCUMENTATION:  JOB ID:  [id number]

Quality ID # 436: Final reports with documentation of one or more dose reduction techniques (e.g., Au
tomated exposure control, adjustment of the mA and/or kV according to patient size, use of iterative 
reconstruction technique)

 2011 [employer]- All Rights Reserved



Reading location - IP/workstation name: [email or web address]

## 2019-02-23 NOTE — RADIOLOGY REPORT (SQ)
EXAM DESCRIPTION:  CHEST SINGLE VIEW



COMPLETED DATE/TIME:  2/23/2019 10:48 am



REASON FOR STUDY:  productive cough, ams



COMPARISON:  1/18/2019



NUMBER OF VIEWS:  One view.



TECHNIQUE:  Single frontal radiographic view of the chest acquired.



LIMITATIONS:  None.



FINDINGS:  LUNGS AND PLEURA: Flattening of the hemidiaphragms.  Scarring in the lung apices, right gr
eater than left.  Left basilar scarring.  No infiltrate.

MEDIASTINUM AND HILAR STRUCTURES: No masses.  Contour normal.

HEART AND VASCULAR STRUCTURES: Heart normal in size.  Normal vasculature.

BONES: No acute findings.

HARDWARE: None in the chest.

OTHER: No other significant finding.



IMPRESSION:  COPD.  NO ACUTE RADIOGRAPHIC FINDING IN THE CHEST.



TECHNICAL DOCUMENTATION:  JOB ID:  [id number]

 2011 [employer]- All Rights Reserved



Reading location - IP/workstation name: [email or web address]

## 2019-02-23 NOTE — PDOC H&P
History of Present Illness


Admission Date/PCP: 


  





  KATELIN PICHARDO DO





Patient complains of: Altered mental status


History of Present Illness: 


GEOVANNI ISRAEL is a 73 year old male with history of atrial fibrillation on 

warfarin, COPD, coronary artery disease status post multiple stent assessments, 

bilateral lower leg bypasses for peripheral vascular disease, abdominal aortic 

aneurysm repair status post stent placement, rheumatoid arthritis, abdominal 

hernia repair was brought to the emergency room by his son with complaints of 

change in mental status.  Patient was here in the emergency room yesterday for 

severe abdominal pain and after doing the CT abdominal pelvis with contrast 

patient was referred back home.  This morning patient woke up around 4 AM 

confused and agitated in severe pain so son decided to bring him to the hospital

again for further evaluation.  On arrival patient's blood pressure is 78/50 

patient is tachycardic tachypneic and the workup shows elevated lactic acid 

levels and found to be in altered mental status and in excruciating pain.  

Medical a consult was called for admission.





I spoke to the ER physician Dr. Ludivina Gibson requested her to call [facility]

because patient has history of stent placement in mid January for kidney stones 

followed by one admission for a urinary tract infection.  She did talk to the 

urologist in [facility] on the CT scan that was done yesterday was reviewed 

and the impression from them is no further urological intervention is needed at 

this point.  Dr. Mendez contacted me again to admit the patient, I spoke to the 

son extensively about his dad's condition and he requested me to transfer the 

patient to [facility].  The information is passed to Dr. Ludivina Gibson, she 

placed a call to the son to discuss the case again.  In the meantime son got in 

touch with the urologist in [facility] discussed the care with the urologist 

on-call day and notify the ER physician that he is okay to keep his dad here at 

[facility] for further management.  So I agreed to admit the 

patient here and requested for renal stone protocol CT scan again today because 

patient is in excruciating pain and Salazar's catheter shows bloody urine.  

Patient full code.  Patient's son understood that there is no urologist 

available in this hospital still wants to keep him here for IV fluids and 

antibiotic therapy if situations changes he is requesting  to get in touch with 

[facility] again for possible transfer.  Unable to get any information the 

patient.  Patient is in excruciating pain, screaming and requesting for pain 

medication.





Past Medical History


Cardiac Medical History: Reports: Atrial Fibrillation, Coronary Artery Disease, 

Myocardial Infarction, Peripheral Vascular Disease


   Denies: Congestive Heart Failure, Hypertension


Pulmonary Medical History: Reports: Bronchitis, Chronic Obstructive Pulmonary 

Disease (COPD), Pneumonia


   Denies: Asthma, Tuberculosis


Neurological Medical History: 


   Denies: Seizures


Renal/ Medical History: 


   Denies: End Stage Renal Disease


GI Medical History: Reports: Gastroesophageal Reflux Disease


   Denies: Cirrhosis


Musculoskeltal Medical History: Reports: Arthritis


Psychiatric Medical History: 


   Denies: Bipolar Disorder, Depression


Hematology: 


   Denies: Anemia, Bleeding Tendencies





Past Surgical History


Past Surgical History: Reports: Cardiac Catheterization, Cholecystectomy, 

Coronary Stent, Orthopedic Surgery - Kyphoplasty-12/2018, Vascular Surgery - 

Abdominal aortic aneurysm and bilateral femoropopliteal graft





Social History


Smoking Status: Former Smoker


Frequency of Alcohol Use: None


Hx Recreational Drug Use: No


Drugs: None





- Advance Directive


Resuscitation Status: Full Code





Family History


Family History: Reviewed & Not Pertinent, CAD, COPD, Hypertension


Parental Family History Reviewed: Yes - sister -rheumatoid arthritis


Children Family History Reviewed: Yes


Sibling(s) Family History Reviewed.: Yes





Medication/Allergy


Allergies/Adverse Reactions: 


                                        





codeine Allergy (Verified 02/22/19 01:25)


   











Review of Systems


Constitutional: ABSENT: chills, fever(s), headache(s), weight loss


Eyes: ABSENT: visual disturbances


Ears: ABSENT: hearing changes


Cardiovascular: ABSENT: chest pain, dyspnea on exertion, edema, orthropnea, 

palpitations


Respiratory: ABSENT: cough, hemoptysis


Gastrointestinal: PRESENT: abdominal pain


Musculoskeletal: ABSENT: joint swelling


Neurological: PRESENT: other - Patient is confused and agitated unable to co

operate.


Psychiatric: PRESENT: anxiety





Physical Exam


Vital Signs: 


                                        











Temp Pulse Resp BP Pulse Ox


 


 98 F      20   124/86 H  100 


 


 02/23/19 08:22     02/23/19 15:01  02/23/19 15:00  02/23/19 12:01








                                 Intake & Output











 02/22/19 02/23/19 02/24/19





 06:59 06:59 06:59


 


Intake Total   1500


 


Balance   1500











General appearance: PRESENT: other - Patient is in severe distress requesting 

for pain medications.


Head exam: PRESENT: atraumatic


Eye exam: PRESENT: PERRLA


Teeth exam: PRESENT: poor dentation


Neck exam: ABSENT: carotid bruit, JVD, lymphadenopathy, thyromegaly


Respiratory exam: PRESENT: decreased breath sounds, tachypnea


Cardiovascular exam: PRESENT: tachycardia


Pulses: PRESENT: other - Poor peripheral pulses.


GI/Abdominal exam: PRESENT: normal bowel sounds, other - Complaining of severe 

tenderness in the left costophrenic angle on gentle palpation.


Gentrourinary exam: PRESENT: indwelling catheter, other - Salazar's catheter in 

place blood stained urine present.


Neurological exam: PRESENT: altered, reflexes normal, CN II-XII grossly intact. 

ABSENT: oriented to person, oriented to time, oriented to situation


Psychiatric exam: PRESENT: agitated, anxious





Results


Laboratory Results: 


                                        





                                 02/23/19 08:55 





                                 02/23/19 08:55 





                                        











  02/23/19 02/23/19 02/23/19





  08:55 08:55 08:55


 


WBC   18.0 H 


 


RBC   4.23 L 


 


Hgb   13.0 L 


 


Hct   39.1 


 


MCV   92 


 


MCH   30.7 


 


MCHC   33.2 


 


RDW   18.7 H 


 


Plt Count   263 


 


Seg Neutrophils %   Not Reportable 


 


Lymphocytes %   Not Reportable 


 


Monocytes %   Not Reportable 


 


Eosinophils %   Not Reportable 


 


Basophils %   Not Reportable 


 


Absolute Neutrophils   Not Reportable 


 


Absolute Lymphocytes   Not Reportable 


 


Absolute Monocytes   Not Reportable 


 


Absolute Eosinophils   Not Reportable 


 


Absolute Basophils   Not Reportable 


 


Sodium    141.6


 


Potassium    4.5


 


Chloride    104


 


Carbon Dioxide    25


 


Anion Gap    13


 


BUN    12


 


Creatinine    0.78


 


Est GFR ( Amer)    > 60


 


Est GFR (Non-Af Amer)    > 60


 


Glucose    105


 


Lactic Acid  3.1 H  


 


Calcium    9.9


 


Total Bilirubin    3.8 H


 


AST    28


 


ALT    26


 


Alkaline Phosphatase    114


 


Ammonia   


 


Total Protein    7.3


 


Albumin    3.7


 


Urine Color   


 


Urine Appearance   


 


Urine pH   


 


Ur Specific Gravity   


 


Urine Protein   


 


Urine Glucose (UA)   


 


Urine Ketones   


 


Urine Blood   


 


Urine Nitrite   


 


Ur Leukocyte Esterase   


 


Urine WBC (Auto)   


 


Urine RBC (Auto)   














  02/23/19 02/23/19 02/23/19





  10:10 12:00 12:00


 


WBC   


 


RBC   


 


Hgb   


 


Hct   


 


MCV   


 


MCH   


 


MCHC   


 


RDW   


 


Plt Count   


 


Seg Neutrophils %   


 


Lymphocytes %   


 


Monocytes %   


 


Eosinophils %   


 


Basophils %   


 


Absolute Neutrophils   


 


Absolute Lymphocytes   


 


Absolute Monocytes   


 


Absolute Eosinophils   


 


Absolute Basophils   


 


Sodium   


 


Potassium   


 


Chloride   


 


Carbon Dioxide   


 


Anion Gap   


 


BUN   


 


Creatinine   


 


Est GFR ( Amer)   


 


Est GFR (Non-Af Amer)   


 


Glucose   


 


Lactic Acid    2.2 H


 


Calcium   


 


Total Bilirubin   


 


AST   


 


ALT   


 


Alkaline Phosphatase   


 


Ammonia   < 8.7 L 


 


Total Protein   


 


Albumin   


 


Urine Color  PETEY  


 


Urine Appearance  CLOUDY  


 


Urine pH  7.0  


 


Ur Specific Gravity  1.018  


 


Urine Protein  100 H  


 


Urine Glucose (UA)  NEGATIVE  


 


Urine Ketones  TRACE H  


 


Urine Blood  LARGE H  


 


Urine Nitrite  NEGATIVE  


 


Ur Leukocyte Esterase  SMALL H  


 


Urine WBC (Auto)  5  


 


Urine RBC (Auto)  >182  








                                        











  02/23/19





  12:00


 


Troponin I  0.021











Impressions: 


                                        





Chest X-Ray  02/23/19 09:50


IMPRESSION:  COPD.  NO ACUTE RADIOGRAPHIC FINDING IN THE CHEST.


 








Head CT  02/23/19 11:35


IMPRESSION:  CHRONIC CHANGES OF ATROPHY AND MICROVASCULAR ISCHEMIA.  NO ACUTE 

PROCESS.


EVIDENCE OF ACUTE STROKE: NO.


 














Assessment & Plan





- Diagnosis


(1) Altered mental status


Qualifiers: 


   Altered mental status type: unspecified   Qualified Code(s): R41.82 - Altered

mental status, unspecified   


Is this a current diagnosis for this admission?: Yes   


Plan: 


2/23/2019 patient is going to be admitted for altered mental status most likely 

secondary to sepsis.  Is going to be placed in IMCU.  Started on IV fluids n

ormal saline at 100 cc/h, to repeat the lactic acid levels, started on IV Zosyn 

and IV vancomycin.  We are going to adjust the doses as per pharmacy 

recommendations.  Blood cultures urine culture was sent.  Aspiration fall 

precautions are requested.  Neurochecks are requested for every 4 hours for the 

next 24 hours.  Swallowing evaluation will be done today.  CT head was negative 

for acute changes.  To start on IV Ativan 1 mg every 8 hours as needed for 

agitation.








(2) Sepsis


Qualifiers: 


   Sepsis type: sepsis due to unspecified organism   Qualified Code(s): A41.9 - 

Sepsis, unspecified organism   


Is this a current diagnosis for this admission?: Yes   


Plan: 


2/23/2019 patient is in septic stock when he came in blood pressure was 78/50 

with IV fluids latest blood pressure is 124/60 initially on presentation is 

tachycardic heart rate is 106 tachypneic respiratory of 24, lactic acid level is

3.1 with altered mental status.  And is to continue IV fluids normal saline at 

100 cc/h started on IV vancomycin and Zosyn, repeat the lactic acid level this 

evening daily labs.  Blood cultures urine cultures are pending.  Chest x-ray 

does not show any pneumonia.  Urinalysis is cloudy with leukocyte esterase 

positive, WBC more than 5 RBCs 182.  Most likely cause of sepsis is urinary 

tract infection.








(3) Leukocytosis


Qualifiers: 


   Leukocytosis type: unspecified   Qualified Code(s): D72.829 - Elevated white 

blood cell count, unspecified   


Is this a current diagnosis for this admission?: Yes   


Plan: 


2/23/2019 admission WBC count is 10,000 most likely secondary to sepsis.








(4) COPD (chronic obstructive pulmonary disease)


Is this a current diagnosis for this admission?: Yes   


Plan: 


2/23/2019 patient has history of COPD not on home oxygen he is ex-smoker quit 

smoking 15 years ago.  He uses Brio and albuterol inhaler at home started him on

Xopenex nebulizations every 6 hours as needed.








(5) Coronary artery disease


Is this a current diagnosis for this admission?: Yes   


Plan: 


2/23/2019-patient has history of coronary artery disease status post stent 

placement multiple times.  Plan to do the serial cardiac enzymes and troponins. 

Initial troponin is less than 0.021.  Patient is on aspirin Plavix and 

simvastatin at home those medications are going to be resumed.








(6) Rheumatoid arthritis


Is this a current diagnosis for this admission?: Yes   


Plan: 


2/23/2019 patient has history of rheumatoid arthritis on methotrexate and 

prednisone at home those medications are restarted again.








(7) Paroxysmal atrial fibrillation


Is this a current diagnosis for this admission?: Yes   


Plan: 


2/23/2019-patient has history of paroxysmal atrial fibrillation on warfarin 1 mg

p.o. daily, Plavix 75 mg p.o. daily, aspirin 81 mg p.o. daily these medications 

he is taking at home plan is to resume those medications in the hospital.








(8) History of AAA (abdominal aortic aneurysm) repair


Is this a current diagnosis for this admission?: Yes   


Plan: 


2/23/2019 patient has history of abdominal aortic aneurysm status post graft 

placement in 2010 as per the patient's son.








(9) BPH (benign prostatic hyperplasia)


Is this a current diagnosis for this admission?: Yes   


Plan: 


2/23/2019 patient has history of BPH on finasteride and Flomax at home those 

medications are restarted during this hospital stay.








- Time


Time Spent: 50 to 70 Minutes


Medications reviewed and adjusted accordingly: Yes


Anticipated discharge: Home

## 2019-02-23 NOTE — EKG REPORT
SEVERITY:- OTHERWISE NORMAL ECG -

SINUS RHYTHM

BORDERLINE LEFT AXIS DEVIATION

:

Confirmed by: Tom Wei MD 23-Feb-2019 21:59:30

## 2019-02-23 NOTE — RADIOLOGY REPORT (SQ)
EXAM DESCRIPTION:  CT ABD/PELVIS NO ORAL OR IV



COMPLETED DATE/TIME:  2/23/2019 5:02 pm



REASON FOR STUDY:  kidney stone



COMPARISON:  2/22/2019 and earlier



TECHNIQUE:  CT scan of the abdomen and pelvis performed without intravenous or oral contrast. Images 
reviewed with lung, soft tissue, and bone windows. Reconstructed coronal and sagittal MPR images revi
ewed. All images stored on PACS.

All CT scanners at this facility use dose modulation, iterative reconstruction, and/or weight based d
osing when appropriate to reduce radiation dose to as low as reasonably achievable (ALARA).

[facility]: Dose Right  [facility]: CareDose    [facility]: Dose Right    [facility]: Teradose 4D    [facility]: Smart Dialective



RADIATION DOSE:  CT Rad equipment meets quality standard of care and radiation dose reduction techniq
ues were employed. CTDIvol: 6.9 mGy. DLP: 371 mGy-cm.mGy.



LIMITATIONS:  None.



FINDINGS:  LOWER CHEST: Unchanged tiny nodules of the right lower lobe which appear to be in a tree-i
n-bud distribution.  Tree-in-bud nodules of the left lower lobe are less apparent on this exam but ma
y be due to motion artifact.  Unchanged pulmonary nodule of the left lower lobe when compared imaging
 dating back to 9/22/2018.

NON-CONTRASTED LIVER, SPLEEN, ADRENALS: Evaluation limited by lack of IV contrast. No identified sign
ificant masses.

PANCREAS: No masses. No peripancreatic inflammatory changes.

GALLBLADDER: Surgically absent.

RIGHT KIDNEY AND URETER: No suspicious masses. Assessment limited by lack of IV contrast.   No signif
icant calcifications.   No hydronephrosis or hydroureter.

LEFT KIDNEY AND URETER:  Left ureteral stent remains in place with the proximal tip terminating in th
e proximal ureter and the distal tip terminating within the urinary bladder.  Unchanged positioning o
f the 7 mm stone within the mid left ureter.  New 4 mm stone within the left renal pelvis (axial imag
e 30, S abdominal image 58).  Unchanged 1.2 cm stone within the superior calyx of the left kidney.  N
o suspicious masses. Assessment limited by lack of IV contrast.   No significant calcifications.   No
 hydronephrosis or hydroureter.

AORTA AND RETROPERITONEUM: Unchanged infrarenal abdominal aortic aneurysm measuring.  Moderate calcif
ied plaque of the visualized aortoiliac system unchanged bilateral femoral artery aneurysm measuring 
3.1 cm on the right and 2.4 cm on the left with adjacent metallic clips bilaterally.

BOWEL AND PERITONEAL CAVITY: No dilated loops of bowel.  Scattered diverticula colon.  Moderate stool
 burden.  No intraperitoneal free air free fluid.  Metallic clips adjacent to the right hepatic flexu
re.

APPENDIX: Not visualized.

PELVIS, BLADDER, AND ABDOMINAL WALL:Salazar catheter in place within the urinary bladder.  Multiple foc
i of air within the urinary bladder, presumed to be secondary to the indwelling catheter.  No pelvic 
free fluid.

BONES: Unchanged severe T12 compression fracture.  Diffuse osteopenia.  No sinister bone lesion.

OTHER: No other significant finding.



IMPRESSION:

1. Unchanged nonobstructing 7 mm stone within the mid left ureter.  New 4 mm stone within the left re
nal pelvis.  Unchanged 1.2 cm stone within the superior calyx of the left kidney.  No hydronephrosis.


2. Unchanged positioning of the left ureteral stent.

3. No additional changes when compared to 2/22/2019.



COMMENT:  Quality ID # [id number]: Final reports with documentation of one or more dose reduction techniques
 (e.g., Automated exposure control, adjustment of the mA and/or kV according to patient size, use of 
iterative reconstruction technique)



TECHNICAL DOCUMENTATION:  JOB ID:  [id number]

 2011 [employer]- All Rights Reserved



Reading location - IP/workstation name: [email or web address]

## 2019-02-24 LAB
ABSOLUTE LYMPHOCYTES# (MANUAL): 0.7 10^3/UL (ref 0.5–4.7)
ABSOLUTE MONOCYTES # (MANUAL): 0.7 10^3/UL (ref 0.1–1.4)
ABSOLUTE NEUTROPHILS# (MANUAL): 12.8 10^3/UL (ref 1.7–8.2)
ADD MANUAL DIFF: YES
ALBUMIN SERPL-MCNC: 3.1 G/DL (ref 3.5–5)
ALP SERPL-CCNC: 91 U/L (ref 38–126)
ALT SERPL-CCNC: 19 U/L (ref 21–72)
ANION GAP SERPL CALC-SCNC: 9 MMOL/L (ref 5–19)
ANISOCYTOSIS BLD QL SMEAR: (no result)
AST SERPL-CCNC: 22 U/L (ref 17–59)
BASOPHILS NFR BLD MANUAL: 0 % (ref 0–2)
BILIRUB DIRECT SERPL-MCNC: 1.5 MG/DL (ref 0–0.4)
BILIRUB SERPL-MCNC: 3.4 MG/DL (ref 0.2–1.3)
BUN SERPL-MCNC: 12 MG/DL (ref 7–20)
CALCIUM: 8.8 MG/DL (ref 8.4–10.2)
CHLORIDE SERPL-SCNC: 111 MMOL/L (ref 98–107)
CO2 SERPL-SCNC: 23 MMOL/L (ref 22–30)
EOSINOPHIL NFR BLD MANUAL: 2 % (ref 0–6)
ERYTHROCYTE [DISTWIDTH] IN BLOOD BY AUTOMATED COUNT: 20.2 % (ref 11.5–14)
GLUCOSE SERPL-MCNC: 93 MG/DL (ref 75–110)
HCT VFR BLD CALC: 33.9 % (ref 37.9–51)
HGB BLD-MCNC: 11 G/DL (ref 13.5–17)
INR PPP: 1.63
MCH RBC QN AUTO: 30.7 PG (ref 27–33.4)
MCHC RBC AUTO-ENTMCNC: 32.5 G/DL (ref 32–36)
MCV RBC AUTO: 94 FL (ref 80–97)
MONOCYTES % (MANUAL): 5 % (ref 3–13)
PLATELET # BLD: 218 10^3/UL (ref 150–450)
PLATELET COMMENT: ADEQUATE
POLYCHROMASIA BLD QL SMEAR: SLIGHT
POTASSIUM SERPL-SCNC: 3.7 MMOL/L (ref 3.6–5)
PROT SERPL-MCNC: 6.2 G/DL (ref 6.3–8.2)
PROTHROMBIN TIME: 20.1 SEC (ref 11.4–15.4)
RBC # BLD AUTO: 3.59 10^6/UL (ref 4.35–5.55)
SEGMENTED NEUTROPHILS % (MAN): 88 % (ref 42–78)
SODIUM SERPL-SCNC: 143.3 MMOL/L (ref 137–145)
TOTAL CELLS COUNTED BLD: 100
VARIANT LYMPHS NFR BLD MANUAL: 5 % (ref 13–45)
WBC # BLD AUTO: 14.5 10^3/UL (ref 4–10.5)

## 2019-02-24 RX ADMIN — MORPHINE SULFATE SCH: 10 INJECTION INTRAMUSCULAR; INTRAVENOUS; SUBCUTANEOUS at 09:39

## 2019-02-24 RX ADMIN — PIPERACILLIN AND TAZOBACTAM SCH MLS/HR: 3; .375 INJECTION, POWDER, LYOPHILIZED, FOR SOLUTION INTRAVENOUS; PARENTERAL at 17:13

## 2019-02-24 RX ADMIN — MORPHINE SULFATE SCH MG: 10 INJECTION INTRAMUSCULAR; INTRAVENOUS; SUBCUTANEOUS at 11:52

## 2019-02-24 RX ADMIN — WARFARIN SODIUM SCH: 1 TABLET ORAL at 21:18

## 2019-02-24 RX ADMIN — FINASTERIDE SCH: 5 TABLET, FILM COATED ORAL at 09:42

## 2019-02-24 RX ADMIN — MORPHINE SULFATE SCH MG: 10 INJECTION INTRAMUSCULAR; INTRAVENOUS; SUBCUTANEOUS at 20:11

## 2019-02-24 RX ADMIN — MORPHINE SULFATE SCH MG: 10 INJECTION INTRAMUSCULAR; INTRAVENOUS; SUBCUTANEOUS at 00:31

## 2019-02-24 RX ADMIN — PIPERACILLIN AND TAZOBACTAM SCH GM: 3; .375 INJECTION, POWDER, LYOPHILIZED, FOR SOLUTION INTRAVENOUS; PARENTERAL at 07:47

## 2019-02-24 RX ADMIN — PIPERACILLIN AND TAZOBACTAM SCH: 3; .375 INJECTION, POWDER, LYOPHILIZED, FOR SOLUTION INTRAVENOUS; PARENTERAL at 21:19

## 2019-02-24 RX ADMIN — VANCOMYCIN HYDROCHLORIDE SCH MLS/HR: 1 INJECTION, POWDER, LYOPHILIZED, FOR SOLUTION INTRAVENOUS at 22:41

## 2019-02-24 RX ADMIN — FAMOTIDINE SCH MG: 10 INJECTION INTRAVENOUS at 21:20

## 2019-02-24 RX ADMIN — SODIUM CHLORIDE PRN MLS/HR: 9 INJECTION, SOLUTION INTRAVENOUS at 17:13

## 2019-02-24 RX ADMIN — PIPERACILLIN AND TAZOBACTAM SCH GM: 3; .375 INJECTION, POWDER, LYOPHILIZED, FOR SOLUTION INTRAVENOUS; PARENTERAL at 02:17

## 2019-02-24 RX ADMIN — WARFARIN SODIUM SCH: 1 TABLET ORAL at 01:23

## 2019-02-24 RX ADMIN — VANCOMYCIN HYDROCHLORIDE SCH MLS/HR: 1 INJECTION, POWDER, LYOPHILIZED, FOR SOLUTION INTRAVENOUS at 11:56

## 2019-02-24 RX ADMIN — PIPERACILLIN AND TAZOBACTAM SCH MLS/HR: 3; .375 INJECTION, POWDER, LYOPHILIZED, FOR SOLUTION INTRAVENOUS; PARENTERAL at 21:18

## 2019-02-24 RX ADMIN — PREDNISONE SCH: 10 TABLET ORAL at 09:41

## 2019-02-24 RX ADMIN — SODIUM CHLORIDE PRN MLS/HR: 9 INJECTION, SOLUTION INTRAVENOUS at 07:47

## 2019-02-24 RX ADMIN — ASPIRIN SCH: 81 TABLET, CHEWABLE ORAL at 09:41

## 2019-02-24 RX ADMIN — FAMOTIDINE SCH MG: 10 INJECTION INTRAVENOUS at 09:46

## 2019-02-24 RX ADMIN — MORPHINE SULFATE SCH MG: 10 INJECTION INTRAMUSCULAR; INTRAVENOUS; SUBCUTANEOUS at 04:05

## 2019-02-24 RX ADMIN — MORPHINE SULFATE SCH MG: 10 INJECTION INTRAMUSCULAR; INTRAVENOUS; SUBCUTANEOUS at 17:12

## 2019-02-24 NOTE — PDOC PROGRESS REPORT
Subjective


Progress Note for:: 02/24/19


Subjective:: 





73-year-old male admitted with sepsis and altered mental status.  Unable to 

swallow anything.  Salazar's catheter shows blood stained urine.  Lactic acid is 

2.2 last night.  Plan is to increase the IV fluids to 150 cc/h do the bladder 

irrigation and repeated lactic acid levels today.  Today's labs are pending.  

Patient states he is in the hospital other than that unable to continue 

communication.  Keep on saying he wants to urinate ,explained to him that he has

a catheter in place.  Overall prognosis poor condition is critical.  Tried to 

call the son to give an update and unable to leave the message.


Reason For Visit: 


ALTERED MENTAL STATUS,SEPSIS,LEUKOCYTOSIS








Physical Exam


Vital Signs: 


                                        











Temp Pulse Resp BP Pulse Ox


 


 97.6 F   91   16   123/87 H  92 


 


 02/24/19 08:01  02/24/19 08:01  02/24/19 08:01  02/24/19 08:01  02/24/19 08:01








                                 Intake & Output











 02/23/19 02/24/19 02/25/19





 06:59 06:59 06:59


 


Intake Total  2500 


 


Output Total  0 


 


Balance  2500 


 


Weight  66.2 kg 











General appearance: PRESENT: other - Moderate distress.


Head exam: PRESENT: atraumatic


Eye exam: PRESENT: PERRLA


Neck exam: ABSENT: carotid bruit, JVD, lymphadenopathy, thyromegaly


Respiratory exam: PRESENT: clear to auscultation hellen.  ABSENT: rales, rhonchi, 

wheezes


Cardiovascular exam: PRESENT: tachycardia


GI/Abdominal exam: PRESENT: normal bowel sounds, soft.  ABSENT: distended, 

guarding, mass, organolmegaly, rebound, tenderness


Gentrourinary exam: PRESENT: indwelling catheter, other - Salazar's catheter in 

place blood stained urine present present.


Extremities exam: PRESENT: full ROM.  ABSENT: calf tenderness, clubbing, pedal 

edema


Neurological exam: PRESENT: other - Patient is agitated, in altered mental 

status.  No no focal neurological deficits.  Patient is unable to follow the 

verbal commands.


Psychiatric exam: PRESENT: agitated, anxious





Results


Laboratory Results: 


                                        











  02/23/19 02/23/19 02/23/19





  08:55 08:55 08:55


 


WBC   18.0 H 


 


RBC   4.23 L 


 


Hgb   13.0 L 


 


Hct   39.1 


 


MCV   92 


 


MCH   30.7 


 


MCHC   33.2 


 


RDW   18.7 H 


 


Plt Count   263 


 


Seg Neutrophils %   Not Reportable 


 


Lymphocytes %   Not Reportable 


 


Monocytes %   Not Reportable 


 


Eosinophils %   Not Reportable 


 


Basophils %   Not Reportable 


 


Absolute Neutrophils   Not Reportable 


 


Absolute Lymphocytes   Not Reportable 


 


Absolute Monocytes   Not Reportable 


 


Absolute Eosinophils   Not Reportable 


 


Absolute Basophils   Not Reportable 


 


Sodium    141.6


 


Potassium    4.5


 


Chloride    104


 


Carbon Dioxide    25


 


Anion Gap    13


 


BUN    12


 


Creatinine    0.78


 


Est GFR ( Amer)    > 60


 


Est GFR (Non-Af Amer)    > 60


 


Glucose    105


 


Lactic Acid  3.1 H  


 


Calcium    9.9


 


Total Bilirubin    3.8 H


 


AST    28


 


ALT    26


 


Alkaline Phosphatase    114


 


Ammonia   


 


Total Protein    7.3


 


Albumin    3.7


 


Urine Color   


 


Urine Appearance   


 


Urine pH   


 


Ur Specific Gravity   


 


Urine Protein   


 


Urine Glucose (UA)   


 


Urine Ketones   


 


Urine Blood   


 


Urine Nitrite   


 


Ur Leukocyte Esterase   


 


Urine WBC (Auto)   


 


Urine RBC (Auto)   














  02/23/19 02/23/19 02/23/19





  10:10 12:00 12:00


 


WBC   


 


RBC   


 


Hgb   


 


Hct   


 


MCV   


 


MCH   


 


MCHC   


 


RDW   


 


Plt Count   


 


Seg Neutrophils %   


 


Lymphocytes %   


 


Monocytes %   


 


Eosinophils %   


 


Basophils %   


 


Absolute Neutrophils   


 


Absolute Lymphocytes   


 


Absolute Monocytes   


 


Absolute Eosinophils   


 


Absolute Basophils   


 


Sodium   


 


Potassium   


 


Chloride   


 


Carbon Dioxide   


 


Anion Gap   


 


BUN   


 


Creatinine   


 


Est GFR ( Amer)   


 


Est GFR (Non-Af Amer)   


 


Glucose   


 


Lactic Acid    2.2 H


 


Calcium   


 


Total Bilirubin   


 


AST   


 


ALT   


 


Alkaline Phosphatase   


 


Ammonia   < 8.7 L 


 


Total Protein   


 


Albumin   


 


Urine Color  PETEY  


 


Urine Appearance  CLOUDY  


 


Urine pH  7.0  


 


Ur Specific Gravity  1.018  


 


Urine Protein  100 H  


 


Urine Glucose (UA)  NEGATIVE  


 


Urine Ketones  TRACE H  


 


Urine Blood  LARGE H  


 


Urine Nitrite  NEGATIVE  


 


Ur Leukocyte Esterase  SMALL H  


 


Urine WBC (Auto)  5  


 


Urine RBC (Auto)  >182  














  02/23/19





  18:19


 


WBC 


 


RBC 


 


Hgb 


 


Hct 


 


MCV 


 


MCH 


 


MCHC 


 


RDW 


 


Plt Count 


 


Seg Neutrophils % 


 


Lymphocytes % 


 


Monocytes % 


 


Eosinophils % 


 


Basophils % 


 


Absolute Neutrophils 


 


Absolute Lymphocytes 


 


Absolute Monocytes 


 


Absolute Eosinophils 


 


Absolute Basophils 


 


Sodium 


 


Potassium 


 


Chloride 


 


Carbon Dioxide 


 


Anion Gap 


 


BUN 


 


Creatinine 


 


Est GFR ( Amer) 


 


Est GFR (Non-Af Amer) 


 


Glucose 


 


Lactic Acid  2.2 H


 


Calcium 


 


Total Bilirubin 


 


AST 


 


ALT 


 


Alkaline Phosphatase 


 


Ammonia 


 


Total Protein 


 


Albumin 


 


Urine Color 


 


Urine Appearance 


 


Urine pH 


 


Ur Specific Gravity 


 


Urine Protein 


 


Urine Glucose (UA) 


 


Urine Ketones 


 


Urine Blood 


 


Urine Nitrite 


 


Ur Leukocyte Esterase 


 


Urine WBC (Auto) 


 


Urine RBC (Auto) 








                                        











  02/23/19 02/23/19 02/23/19





  12:00 18:19 18:19


 


Creatine Kinase   23 L 


 


Troponin I  0.021   0.021














  02/24/19 02/24/19





  00:33 00:33


 


Creatine Kinase  35 L 


 


Troponin I   0.022











Impressions: 


                                        





Abdomen/Pelvis CT  02/23/19 00:00


IMPRESSION:


1. Unchanged nonobstructing 7 mm stone within the mid left ureter.  New 4 mm 

stone within the left renal pelvis.  Unchanged 1.2 cm stone within the superior 

calyx of the left kidney.  No hydronephrosis.


2. Unchanged positioning of the left ureteral stent.


3. No additional changes when compared to 2/22/2019.


 








Chest X-Ray  02/23/19 09:50


IMPRESSION:  COPD.  NO ACUTE RADIOGRAPHIC FINDING IN THE CHEST.


 








Head CT  02/23/19 11:35


IMPRESSION:  CHRONIC CHANGES OF ATROPHY AND MICROVASCULAR ISCHEMIA.  NO ACUTE 

PROCESS.


EVIDENCE OF ACUTE STROKE: NO.


 














Assessment & Plan





- Diagnosis


(1) Altered mental status


Qualifiers: 


   Altered mental status type: unspecified   Qualified Code(s): R41.82 - Altered

mental status, unspecified   


Is this a current diagnosis for this admission?: Yes   


Plan: 


2/23/2019 patient is going to be admitted for altered mental status most likely 

secondary to sepsis.  Is going to be placed in IMCU.  Started on IV fluids 

normal saline at 100 cc/h, to repeat the lactic acid levels, started on IV Zosyn

and IV vancomycin.  We are going to adjust the doses as per pharmacy 

recommendations.  Blood cultures urine culture was sent.  Aspiration fall 

precautions are requested.  Neurochecks are requested for every 4 hours for the 

next 24 hours.  Swallowing evaluation will be done today.  CT head was negative 

for acute changes.  To start on IV Ativan 1 mg every 8 hours as needed for 

agitation.





2/24/2019-altered mental status/acute and coagulopathy most likely secondary to 

sepsis.  Presently he is on IV Zosyn and vancomycin.  Urine cultures blood 

cultures are pending.  CT head was negative.  He failed swallowing evaluation.  

We are going to request for PT OT consult and discharge planner.  Requested for 

neuro checks.  Aspiration fall  Seizure precautions are requested.  Overall 

prognosis poor condition is critical.








(2) Sepsis


Qualifiers: 


   Sepsis type: sepsis due to unspecified organism   Qualified Code(s): A41.9 - 

Sepsis, unspecified organism   


Is this a current diagnosis for this admission?: Yes   


Plan: 


2/23/2019 patient is in septic stock when he came in blood pressure was 78/50 

with IV fluids latest blood pressure is 124/60 initially on presentation is 

tachycardic heart rate is 106 tachypneic respiratory of 24, lactic acid level is

3.1 with altered mental status.  And is to continue IV fluids normal saline at 

100 cc/h started on IV vancomycin and Zosyn, repeat the lactic acid level this 

evening daily labs.  Blood cultures urine cultures are pending.  Chest x-ray 

does not show any pneumonia.  Urinalysis is cloudy with leukocyte esterase 

positive, WBC more than 5 RBCs 182.  Most likely cause of sepsis is urinary 

tract infection.





2/24/2019-patient's respiratory rate is 31, blood pressure is 1[birth year]24/1981 

improved.  Latest lactic acid is 0.2.  Urine cultures blood cultures are 

pending.  Patient is in altered mental status.  In my opinion patient is in 

septic shock.  Patient is full code.  Plan is to continue IV fluids, IV Zosyn 

and IV vancomycin.








(3) Leukocytosis


Qualifiers: 


   Leukocytosis type: unspecified   Qualified Code(s): D72.829 - Elevated white 

blood cell count, unspecified   


Is this a current diagnosis for this admission?: Yes   


Plan: 


2/23/2019 admission WBC count is 10,000 most likely secondary to sepsis.





2/24/2019-yesterday's WBC count is 18,000 today's labs are pending leukocytosis 

most likely secondary to sepsis.








(4) COPD (chronic obstructive pulmonary disease)


Is this a current diagnosis for this admission?: Yes   


Plan: 


2/23/2019 patient has history of COPD not on home oxygen he is ex-smoker quit 

smoking 15 years ago.  He uses Brio and albuterol inhaler at home started him on

Xopenex nebulizations every 6 hours as needed.





2/24/2019-patient has history of COPD he is on nebulizer treatments.  He was 

pulse ox are in the 90s.








(5) Coronary artery disease


Is this a current diagnosis for this admission?: Yes   


Plan: 


2/23/2019-patient has history of coronary artery disease status post stent 

placement multiple times.  Plan to do the serial cardiac enzymes and troponins. 

Initial troponin is less than 0.021.  Patient is on aspirin Plavix and 

simvastatin at home those medications are going to be resumed.





2/24/2019-patient has history of coronary artery disease previous history of 

multiple stents placement.  At home he is on aspirin, Plavix, simvastatin.  We 

resumed his home medications.  But patient is unable to swallow failed 

swallowing evaluation because of  altered mental status.








(6) Rheumatoid arthritis


Is this a current diagnosis for this admission?: Yes   


Plan: 


2/23/2019 patient has history of rheumatoid arthritis on methotrexate and 

prednisone at home those medications are restarted again.





2/24/2019-patient has history of rheumatoid arthritis on methotrexate and p.o. 

prednisone at home.  Those medications are resumed but the patient is unable to 

swallow the pills at this moment.








(7) Paroxysmal atrial fibrillation


Is this a current diagnosis for this admission?: Yes   


Plan: 


2/23/2019-patient has history of paroxysmal atrial fibrillation on warfarin 1 mg

p.o. daily, Plavix 75 mg p.o. daily, aspirin 81 mg p.o. daily these medications 

he is taking at home plan is to resume those medications in the hospital.





2/24/2019-patient is giving the history of his dad having paroxysmal atrial 

fibrillation, patient in sinus rhythm here.  We resumed his warfarin yesterday 

but is unable to take any oral medications at this point.








(8) History of AAA (abdominal aortic aneurysm) repair


Is this a current diagnosis for this admission?: Yes   


Plan: 


2/23/2019 patient has history of abdominal aortic aneurysm status post graft 

placement in 2010 as per the patient's son.





2/24/2019 patient has history of aortic aneurysm status post graft placement in 

2010.  CT scan of the abdomen does not show any abnormalities.








(9) BPH (benign prostatic hyperplasia)


Is this a current diagnosis for this admission?: Yes   


Plan: 


2/23/2019 patient has history of BPH on finasteride and Flomax at home those 

medications are restarted during this hospital stay.





2/24/2019 patient has history of BPH on finasteride and Flomax at home those 

medications are resumed but patient is unable to swallow any medications at this

moment patient has a Salazar's catheter draining blood stained urine.  Overall 

prognosis and condition again critical.

## 2019-02-25 LAB
ABSOLUTE LYMPHOCYTES# (MANUAL): 0.2 10^3/UL (ref 0.5–4.7)
ABSOLUTE MONOCYTES # (MANUAL): 1.4 10^3/UL (ref 0.1–1.4)
ABSOLUTE NEUTROPHILS# (MANUAL): 15.7 10^3/UL (ref 1.7–8.2)
ADD MANUAL DIFF: YES
ALBUMIN SERPL-MCNC: 2.8 G/DL (ref 3.5–5)
ALP SERPL-CCNC: 84 U/L (ref 38–126)
ALT SERPL-CCNC: 26 U/L (ref 21–72)
ANION GAP SERPL CALC-SCNC: 9 MMOL/L (ref 5–19)
ANISOCYTOSIS BLD QL SMEAR: (no result)
AST SERPL-CCNC: 20 U/L (ref 17–59)
BASOPHILS NFR BLD MANUAL: 2 % (ref 0–2)
BILIRUB DIRECT SERPL-MCNC: 1.2 MG/DL (ref 0–0.4)
BILIRUB SERPL-MCNC: 3.1 MG/DL (ref 0.2–1.3)
BUN SERPL-MCNC: 9 MG/DL (ref 7–20)
CALCIUM: 8.9 MG/DL (ref 8.4–10.2)
CHLORIDE SERPL-SCNC: 115 MMOL/L (ref 98–107)
CO2 SERPL-SCNC: 21 MMOL/L (ref 22–30)
EOSINOPHIL NFR BLD MANUAL: 1 % (ref 0–6)
ERYTHROCYTE [DISTWIDTH] IN BLOOD BY AUTOMATED COUNT: 19.8 % (ref 11.5–14)
GLUCOSE SERPL-MCNC: 94 MG/DL (ref 75–110)
HCT VFR BLD CALC: 33.2 % (ref 37.9–51)
HGB BLD-MCNC: 10.8 G/DL (ref 13.5–17)
INR PPP: 1.71
MCH RBC QN AUTO: 30.8 PG (ref 27–33.4)
MCHC RBC AUTO-ENTMCNC: 32.6 G/DL (ref 32–36)
MCV RBC AUTO: 94 FL (ref 80–97)
MONOCYTES % (MANUAL): 8 % (ref 3–13)
OVALOCYTES BLD QL SMEAR: (no result)
PLATELET # BLD: 207 10^3/UL (ref 150–450)
PLATELET COMMENT: ADEQUATE
POIKILOCYTOSIS BLD QL SMEAR: (no result)
POTASSIUM SERPL-SCNC: 3.3 MMOL/L (ref 3.6–5)
PROT SERPL-MCNC: 6.1 G/DL (ref 6.3–8.2)
PROTHROMBIN TIME: 20.9 SEC (ref 11.4–15.4)
RBC # BLD AUTO: 3.51 10^6/UL (ref 4.35–5.55)
SEGMENTED NEUTROPHILS % (MAN): 88 % (ref 42–78)
SODIUM SERPL-SCNC: 144.9 MMOL/L (ref 137–145)
TOTAL CELLS COUNTED BLD: 100
VANCOMYCIN,TROUGH: 16.4 UG/ML (ref 5–20)
VARIANT LYMPHS NFR BLD MANUAL: 1 % (ref 13–45)
WBC # BLD AUTO: 17.8 10^3/UL (ref 4–10.5)

## 2019-02-25 RX ADMIN — MORPHINE SULFATE SCH MG: 10 INJECTION INTRAMUSCULAR; INTRAVENOUS; SUBCUTANEOUS at 07:54

## 2019-02-25 RX ADMIN — SIMVASTATIN SCH MG: 40 TABLET, FILM COATED ORAL at 21:40

## 2019-02-25 RX ADMIN — MORPHINE SULFATE SCH MG: 10 INJECTION INTRAMUSCULAR; INTRAVENOUS; SUBCUTANEOUS at 20:15

## 2019-02-25 RX ADMIN — ASPIRIN SCH MG: 81 TABLET, CHEWABLE ORAL at 10:43

## 2019-02-25 RX ADMIN — WARFARIN SODIUM SCH MG: 1 TABLET ORAL at 21:40

## 2019-02-25 RX ADMIN — MORPHINE SULFATE SCH MG: 10 INJECTION INTRAMUSCULAR; INTRAVENOUS; SUBCUTANEOUS at 00:16

## 2019-02-25 RX ADMIN — PIPERACILLIN AND TAZOBACTAM SCH MLS/HR: 3; .375 INJECTION, POWDER, LYOPHILIZED, FOR SOLUTION INTRAVENOUS; PARENTERAL at 16:10

## 2019-02-25 RX ADMIN — FAMOTIDINE SCH MG: 10 INJECTION INTRAVENOUS at 10:46

## 2019-02-25 RX ADMIN — MORPHINE SULFATE SCH MG: 10 INJECTION INTRAMUSCULAR; INTRAVENOUS; SUBCUTANEOUS at 12:17

## 2019-02-25 RX ADMIN — FAMOTIDINE SCH MG: 10 INJECTION INTRAVENOUS at 21:40

## 2019-02-25 RX ADMIN — SOTALOL HYDROCHLORIDE SCH MG: 80 TABLET ORAL at 21:40

## 2019-02-25 RX ADMIN — PREDNISONE SCH MG: 10 TABLET ORAL at 10:43

## 2019-02-25 RX ADMIN — PIPERACILLIN AND TAZOBACTAM SCH MLS/HR: 3; .375 INJECTION, POWDER, LYOPHILIZED, FOR SOLUTION INTRAVENOUS; PARENTERAL at 10:39

## 2019-02-25 RX ADMIN — TAMSULOSIN HYDROCHLORIDE SCH MG: 0.4 CAPSULE ORAL at 21:41

## 2019-02-25 RX ADMIN — VANCOMYCIN HYDROCHLORIDE SCH MLS/HR: 1 INJECTION, POWDER, LYOPHILIZED, FOR SOLUTION INTRAVENOUS at 10:46

## 2019-02-25 RX ADMIN — SODIUM CHLORIDE PRN MLS/HR: 9 INJECTION, SOLUTION INTRAVENOUS at 02:35

## 2019-02-25 RX ADMIN — PIPERACILLIN AND TAZOBACTAM SCH MLS/HR: 3; .375 INJECTION, POWDER, LYOPHILIZED, FOR SOLUTION INTRAVENOUS; PARENTERAL at 20:15

## 2019-02-25 RX ADMIN — MORPHINE SULFATE SCH MG: 10 INJECTION INTRAMUSCULAR; INTRAVENOUS; SUBCUTANEOUS at 03:54

## 2019-02-25 RX ADMIN — SOTALOL HYDROCHLORIDE SCH MG: 80 TABLET ORAL at 10:43

## 2019-02-25 RX ADMIN — CLOPIDOGREL BISULFATE SCH MG: 75 TABLET, FILM COATED ORAL at 10:43

## 2019-02-25 RX ADMIN — FINASTERIDE SCH MG: 5 TABLET, FILM COATED ORAL at 10:43

## 2019-02-25 RX ADMIN — SODIUM CHLORIDE PRN MLS/HR: 9 INJECTION, SOLUTION INTRAVENOUS at 10:53

## 2019-02-25 RX ADMIN — MORPHINE SULFATE SCH MG: 10 INJECTION INTRAMUSCULAR; INTRAVENOUS; SUBCUTANEOUS at 16:11

## 2019-02-25 RX ADMIN — PIPERACILLIN AND TAZOBACTAM SCH MLS/HR: 3; .375 INJECTION, POWDER, LYOPHILIZED, FOR SOLUTION INTRAVENOUS; PARENTERAL at 03:53

## 2019-02-25 NOTE — PDOC PROGRESS REPORT
Subjective


Progress Note for:: 02/25/19


Subjective:: 





73-year-old male admitted with sepsis and altered mental status.  Unable to 

swallow anything.  Salazar's catheter shows blood stained urine.  Lactic acid is 

2.2 last night.  Plan is to increase the IV fluids to 150 cc/h do the bladder 

irrigation and repeated lactic acid levels today.  Today's labs are pending.  

Patient states he is in the hospital other than that unable to continue 

communication.  Keep on saying he wants to urinate ,explained to him that he has

a catheter in place.  Overall prognosis poor condition is critical.  Tried to 

call the son to give an update and unable to leave the message.





12/25/2019-patient is more alert more awake today.  Able to give his birthday, 

he did know where he is at.  Therapy is working with the patient today.  Patient

shows significant improvement.  We are going to try to give p.o. medications 

this morning.  No acute events in last 24 hours.  Patient is afebrile.


Reason For Visit: 


ALTERED MENTAL STATUS,SEPSIS,LEUKOCYTOSIS








Physical Exam


Vital Signs: 


                                        











Temp Pulse Resp BP Pulse Ox


 


 98.4 F   93   20   138/71 H  92 


 


 02/25/19 07:05  02/25/19 07:05  02/25/19 07:05  02/25/19 07:05  02/25/19 07:05








                                 Intake & Output











 02/24/19 02/25/19 02/26/19





 06:59 06:59 06:59


 


Intake Total 2500 5500 


 


Output Total 0 1300 


 


Balance 2500 4200 


 


Weight 66.2 kg 62 kg 











General appearance: PRESENT: no acute distress


Head exam: PRESENT: atraumatic


Eye exam: PRESENT: PERRLA


Mouth exam: PRESENT: moist, tongue midline


Neck exam: ABSENT: carotid bruit, JVD, lymphadenopathy, thyromegaly


Cardiovascular exam: PRESENT: RRR.  ABSENT: diastolic murmur, rubs, systolic 

murmur


GI/Abdominal exam: PRESENT: normal bowel sounds, soft.  ABSENT: distended, guard

ing, mass, organolmegaly, rebound, tenderness


Gentrourinary exam: PRESENT: indwelling catheter, other - Patient is on 

continuous bladder irrigation still have blood in the Salazar's catheter.


Extremities exam: PRESENT: other


Neurological exam: PRESENT: alert, awake, oriented to person, oriented to place,

oriented to time, oriented to situation, CN II-XII grossly intact.  ABSENT: 

motor sensory deficit


Psychiatric exam: PRESENT: appropriate affect, normal mood.  ABSENT: homicidal 

ideation, suicidal ideation





Results


Laboratory Results: 


                                        





                                 02/24/19 08:13 





                                 02/24/19 08:13 





                                        











  02/24/19 02/24/19 02/24/19





  08:13 08:13 08:13


 


WBC    14.5 H


 


RBC    3.59 L


 


Hgb    11.0 L


 


Hct    33.9 L


 


MCV    94


 


MCH    30.7


 


MCHC    32.5


 


RDW    20.2 H


 


Plt Count    218


 


Sodium  143.3  


 


Potassium  3.7  


 


Chloride  111 H  


 


Carbon Dioxide  23  


 


Anion Gap  9  


 


BUN  12  


 


Creatinine  0.71  


 


Est GFR ( Amer)  > 60  


 


Est GFR (Non-Af Amer)  > 60  


 


Glucose  93  


 


Lactic Acid   


 


Calcium  8.8  


 


Magnesium  1.8  


 


Total Bilirubin  3.4 H  


 


AST  22  


 


ALT  19 L  


 


Alkaline Phosphatase  91  


 


Total Protein  6.2 L  


 


Albumin  3.1 L  


 


TSH   1.67 














  02/24/19 02/25/19





  11:20 06:07


 


WBC  


 


RBC  


 


Hgb  


 


Hct  


 


MCV  


 


MCH  


 


MCHC  


 


RDW  


 


Plt Count  


 


Sodium  


 


Potassium  


 


Chloride  


 


Carbon Dioxide  


 


Anion Gap  


 


BUN  


 


Creatinine  


 


Est GFR ( Amer)  


 


Est GFR (Non-Af Amer)  


 


Glucose  


 


Lactic Acid  1.1 


 


Calcium  


 


Magnesium   1.7


 


Total Bilirubin  


 


AST  


 


ALT  


 


Alkaline Phosphatase  


 


Total Protein  


 


Albumin  


 


TSH  








                                        











  02/23/19 02/23/19 02/23/19





  12:00 18:19 18:19


 


Creatine Kinase   23 L 


 


Troponin I  0.021   0.021














  02/24/19 02/24/19 02/24/19





  00:33 00:33 08:13


 


Creatine Kinase  35 L   24 L


 


Troponin I   0.022 














  02/24/19





  08:13


 


Creatine Kinase 


 


Troponin I  < 0.012











Impressions: 


                                        





Abdomen/Pelvis CT  02/23/19 00:00


IMPRESSION:


1. Unchanged nonobstructing 7 mm stone within the mid left ureter.  New 4 mm s

tone within the left renal pelvis.  Unchanged 1.2 cm stone within the superior 

calyx of the left kidney.  No hydronephrosis.


2. Unchanged positioning of the left ureteral stent.


3. No additional changes when compared to 2/22/2019.


 








Chest X-Ray  02/23/19 09:50


IMPRESSION:  COPD.  NO ACUTE RADIOGRAPHIC FINDING IN THE CHEST.


 








Head CT  02/23/19 11:35


IMPRESSION:  CHRONIC CHANGES OF ATROPHY AND MICROVASCULAR ISCHEMIA.  NO ACUTE 

PROCESS.


EVIDENCE OF ACUTE STROKE: NO.


 














Assessment & Plan





- Diagnosis


(1) Altered mental status


Qualifiers: 


   Altered mental status type: unspecified   Qualified Code(s): R41.82 - Altered

mental status, unspecified   


Is this a current diagnosis for this admission?: Yes   


Plan: 


2/23/2019 patient is going to be admitted for altered mental status most likely 

secondary to sepsis.  Is going to be placed in IMCU.  Started on IV fluids 

normal saline at 100 cc/h, to repeat the lactic acid levels, started on IV Zosyn

and IV vancomycin.  We are going to adjust the doses as per pharmacy 

recommendations.  Blood cultures urine culture was sent.  Aspiration fall 

precautions are requested.  Neurochecks are requested for every 4 hours for the 

next 24 hours.  Swallowing evaluation will be done today.  CT head was negative 

for acute changes.  To start on IV Ativan 1 mg every 8 hours as needed for 

agitation.





2/24/2019-altered mental status/acute encephalopathy most likely secondary to 

sepsis.  Presently he is on IV Zosyn and vancomycin.  Urine cultures blood 

cultures are pending.  CT head was negative.  He failed swallowing evaluation.  

We are going to request for PT OT consult and discharge planner.  Requested for 

neuro checks.  Aspiration fall  Seizure precautions are requested.  Overall 

prognosis poor condition is critical.





2/25/2019 altered mental status/acute encephalopathy most likely secondary to 

sepsis.  Blood cultures are negative so far.  Patient is presently on IV Zosyn 

and vancomycin.  It is much more alert and oriented.  Be going to try to give 

his morning p.o. medications.  Therapy is working with the patient.  Aspiration 

fall seizure precautions are requested.








(2) Sepsis


Qualifiers: 


   Sepsis type: sepsis due to unspecified organism   Qualified Code(s): A41.9 - 

Sepsis, unspecified organism   


Is this a current diagnosis for this admission?: Yes   


Plan: 


2/23/2019 patient is in septic stock when he came in blood pressure was 78/50 

with IV fluids latest blood pressure is 124/60 initially on presentation is 

tachycardic heart rate is 106 tachypneic respiratory of 24, lactic acid level is

3.1 with altered mental status.  And is to continue IV fluids normal saline at 

100 cc/h started on IV vancomycin and Zosyn, repeat the lactic acid level this 

evening daily labs.  Blood cultures urine cultures are pending.  Chest x-ray 

does not show any pneumonia.  Urinalysis is cloudy with leukocyte esterase 

positive, WBC more than 5 RBCs 182.  Most likely cause of sepsis is urinary 

tract infection.





2/24/2019-patient's respiratory rate is 31, blood pressure is 124/79 improved.  

Latest lactic acid is 0.2.  Urine cultures blood cultures are pending.  Patient 

is in altered mental status.  In my opinion patient is in septic shock.  Patient

is full code.  Plan is to continue IV fluids, IV Zosyn and IV vancomycin.





2/25/2019-patient's T-max is 98.4 pulse rate is 93.  Blood pressure today is 

138/71.  Plan is to continue the IV antibiotic therapy and to decrease the IV 

infusion of the fluids.  Cultures are pending.  Presently on IV Zosyn and 

vancomycin plan is to continue those antibiotics.








(3) Leukocytosis


Qualifiers: 


   Leukocytosis type: unspecified   Qualified Code(s): D72.829 - Elevated white 

blood cell count, unspecified   


Is this a current diagnosis for this admission?: Yes   


Plan: 


2/23/2019 admission WBC count is 10,000 most likely secondary to sepsis.





2/24/2019-yesterday's WBC count is 18,000 today's labs are pending leukocytosis 

most likely secondary to sepsis.





2/25/2019-today's labs are pending.  Admission WBC is 18,000 yesterday WBC came 

down to 14,500.  Plan is to continue the present management.  Leukocytosis is 

resolving.








(4) COPD (chronic obstructive pulmonary disease)


Is this a current diagnosis for this admission?: Yes   


Plan: 


2/23/2019 patient has history of COPD not on home oxygen he is ex-smoker quit 

smoking 15 years ago.  He uses Brio and albuterol inhaler at home started him on

Xopenex nebulizations every 6 hours as needed.





2/24/2019-patient has history of COPD he is on nebulizer treatments.  He was 

pulse ox are in the 90s.





2/25/2019 patient has history of COPD his pulse ox is 82% on room air. he Is 

getting Xopenex nebulizer treatments.  Plan is to continue the present manage

ment.








(5) Coronary artery disease


Is this a current diagnosis for this admission?: Yes   


Plan: 


2/23/2019-patient has history of coronary artery disease status post stent 

placement multiple times.  Plan to do the serial cardiac enzymes and troponins. 

Initial troponin is less than 0.021.  Patient is on aspirin Plavix and 

simvastatin at home those medications are going to be resumed.





2/24/2019-patient has history of coronary artery disease previous history of 

multiple stents placement.  At home he is on aspirin, Plavix, simvastatin.  We 

resumed his home medications.  But patient is unable to swallow failed 

swallowing evaluation because of  altered mental status.





2/25/2019-patient has history of coronary artery disease with multiple stent 

placement.  At home he is on aspirin, Plavix, simvastatin plan is to continue 

with his home medications.  Patient failed his swallowing evaluation yesterday 

is much more alert and oriented today hopefully he able to take his oral 

medications.








(6) Rheumatoid arthritis


Is this a current diagnosis for this admission?: Yes   


Plan: 


2/23/2019 patient has history of rheumatoid arthritis on methotrexate and 

prednisone at home those medications are restarted again.





2/24/2019-patient has history of rheumatoid arthritis on methotrexate and p.o. 

prednisone at home.  Those medications are resumed but the patient is unable to 

swallow the pills at this moment.





2/25/2019-patient has history of rheumatoid arthritis on methotrexate and 

prednisone at home occasions once able to swallow the medications.








(7) Paroxysmal atrial fibrillation


Is this a current diagnosis for this admission?: Yes   


Plan: 


2/23/2019-patient has history of paroxysmal atrial fibrillation on warfarin 1 mg

p.o. daily, Plavix 75 mg p.o. daily, aspirin 81 mg p.o. daily these medications 

he is taking at home plan is to resume those medications in the hospital.





2/24/2019-patient is giving the history of his dad having paroxysmal atrial 

fibrillation, patient in sinus rhythm here.  We resumed his warfarin yesterday 

but is unable to take any oral medications at this point.





2/25/2019-patient is has a history of paroxysmal atrial fibrillation he is in 

sinus rhythm he is on warfarin at home.  We are going to check his PT/INR on 

daily basis.








(8) History of AAA (abdominal aortic aneurysm) repair


Is this a current diagnosis for this admission?: Yes   


Plan: 


2/23/2019 patient has history of abdominal aortic aneurysm status post graft 

placement in 2010 as per the patient's son.





2/24/2019 patient has history of aortic aneurysm status post graft placement in 

2010.  CT scan of the abdomen does not show any abnormalities.














(9) BPH (benign prostatic hyperplasia)


Is this a current diagnosis for this admission?: Yes   


Plan: 


2/23/2019 patient has history of BPH on finasteride and Flomax at home those 

medications are restarted during this hospital stay.





2/24/2019 patient has history of BPH on finasteride and Flomax at home those 

medications are resumed but patient is unable to swallow any medications at this

moment patient has a Salazar's catheter draining blood stained urine.  Overall 

prognosis and condition again critical.





2/25/2019 patient has history of BPH and he is on finasteride and Flomax at home

plan is to resume those medications.








(10) UTI (urinary tract infection)


Is this a current diagnosis for this admission?: Yes   


Plan: 


2/25/2019-patient has a urinary stent placement at [facility].  He came in 

with septic shock.  Most likely source of infection in the urine.  Urine 

analysis initially shows cloudy urine with increased WBC and RBC.  Urine culture

is pending.  Presently on IV vancomycin and Zosyn plan is to continue those 

medications.








- Time


Time Spent with patient: 15-24 minutes


Medications reviewed and adjusted accordingly: Yes


Anticipated discharge: SNF

## 2019-02-26 LAB
ADD MANUAL DIFF: NO
ALBUMIN SERPL-MCNC: 2.2 G/DL (ref 3.5–5)
ALP SERPL-CCNC: 61 U/L (ref 38–126)
ALT SERPL-CCNC: 13 U/L (ref 21–72)
ANION GAP SERPL CALC-SCNC: 4 MMOL/L (ref 5–19)
AST SERPL-CCNC: 16 U/L (ref 17–59)
BASOPHILS # BLD AUTO: 0 10^3/UL (ref 0–0.2)
BASOPHILS NFR BLD AUTO: 0.3 % (ref 0–2)
BILIRUB DIRECT SERPL-MCNC: 0.9 MG/DL (ref 0–0.4)
BILIRUB SERPL-MCNC: 2 MG/DL (ref 0.2–1.3)
BUN SERPL-MCNC: 10 MG/DL (ref 7–20)
CALCIUM: 8.5 MG/DL (ref 8.4–10.2)
CHLORIDE SERPL-SCNC: 116 MMOL/L (ref 98–107)
CO2 SERPL-SCNC: 24 MMOL/L (ref 22–30)
EOSINOPHIL # BLD AUTO: 0.1 10^3/UL (ref 0–0.6)
EOSINOPHIL NFR BLD AUTO: 1.7 % (ref 0–6)
ERYTHROCYTE [DISTWIDTH] IN BLOOD BY AUTOMATED COUNT: 20.4 % (ref 11.5–14)
GLUCOSE SERPL-MCNC: 84 MG/DL (ref 75–110)
HCT VFR BLD CALC: 26.9 % (ref 37.9–51)
HGB BLD-MCNC: 9 G/DL (ref 13.5–17)
LYMPHOCYTES # BLD AUTO: 0.7 10^3/UL (ref 0.5–4.7)
LYMPHOCYTES NFR BLD AUTO: 8.2 % (ref 13–45)
MCH RBC QN AUTO: 31.8 PG (ref 27–33.4)
MCHC RBC AUTO-ENTMCNC: 33.5 G/DL (ref 32–36)
MCV RBC AUTO: 95 FL (ref 80–97)
MONOCYTES # BLD AUTO: 0.8 10^3/UL (ref 0.1–1.4)
MONOCYTES NFR BLD AUTO: 9.2 % (ref 3–13)
NEUTROPHILS # BLD AUTO: 6.7 10^3/UL (ref 1.7–8.2)
NEUTS SEG NFR BLD AUTO: 80.6 % (ref 42–78)
PLATELET # BLD: 160 10^3/UL (ref 150–450)
POTASSIUM SERPL-SCNC: 3.2 MMOL/L (ref 3.6–5)
PROT SERPL-MCNC: 4.9 G/DL (ref 6.3–8.2)
RBC # BLD AUTO: 2.84 10^6/UL (ref 4.35–5.55)
SODIUM SERPL-SCNC: 143.6 MMOL/L (ref 137–145)
TOTAL CELLS COUNTED % (AUTO): 100 %
WBC # BLD AUTO: 8.3 10^3/UL (ref 4–10.5)

## 2019-02-26 RX ADMIN — MORPHINE SULFATE SCH MG: 10 INJECTION INTRAMUSCULAR; INTRAVENOUS; SUBCUTANEOUS at 04:16

## 2019-02-26 RX ADMIN — MORPHINE SULFATE PRN MG: 10 INJECTION INTRAMUSCULAR; INTRAVENOUS; SUBCUTANEOUS at 12:03

## 2019-02-26 RX ADMIN — FINASTERIDE SCH MG: 5 TABLET, FILM COATED ORAL at 10:46

## 2019-02-26 RX ADMIN — FAMOTIDINE SCH MG: 20 TABLET, FILM COATED ORAL at 21:51

## 2019-02-26 RX ADMIN — SOTALOL HYDROCHLORIDE SCH MG: 80 TABLET ORAL at 21:52

## 2019-02-26 RX ADMIN — SOTALOL HYDROCHLORIDE SCH MG: 80 TABLET ORAL at 10:40

## 2019-02-26 RX ADMIN — MAGNESIUM OXIDE TAB 400 MG (241.3 MG ELEMENTAL MG) SCH MG: 400 (241.3 MG) TAB at 10:45

## 2019-02-26 RX ADMIN — TAMSULOSIN HYDROCHLORIDE SCH MG: 0.4 CAPSULE ORAL at 21:52

## 2019-02-26 RX ADMIN — SODIUM CHLORIDE PRN MLS/HR: 9 INJECTION, SOLUTION INTRAVENOUS at 04:05

## 2019-02-26 RX ADMIN — PIPERACILLIN AND TAZOBACTAM SCH MLS/HR: 3; .375 INJECTION, POWDER, LYOPHILIZED, FOR SOLUTION INTRAVENOUS; PARENTERAL at 14:19

## 2019-02-26 RX ADMIN — FAMOTIDINE SCH MG: 20 TABLET, FILM COATED ORAL at 10:46

## 2019-02-26 RX ADMIN — ASPIRIN SCH MG: 81 TABLET, CHEWABLE ORAL at 10:44

## 2019-02-26 RX ADMIN — MORPHINE SULFATE PRN MG: 10 INJECTION INTRAMUSCULAR; INTRAVENOUS; SUBCUTANEOUS at 18:54

## 2019-02-26 RX ADMIN — POTASSIUM CHLORIDE SCH MEQ: 750 TABLET, FILM COATED, EXTENDED RELEASE ORAL at 10:40

## 2019-02-26 RX ADMIN — SIMVASTATIN SCH MG: 40 TABLET, FILM COATED ORAL at 21:52

## 2019-02-26 RX ADMIN — MORPHINE SULFATE SCH MG: 10 INJECTION INTRAMUSCULAR; INTRAVENOUS; SUBCUTANEOUS at 00:33

## 2019-02-26 RX ADMIN — BACLOFEN PRN MG: 10 TABLET ORAL at 14:19

## 2019-02-26 RX ADMIN — ENOXAPARIN SODIUM SCH MG: 40 INJECTION SUBCUTANEOUS at 21:51

## 2019-02-26 RX ADMIN — MORPHINE SULFATE SCH MG: 10 INJECTION INTRAMUSCULAR; INTRAVENOUS; SUBCUTANEOUS at 08:21

## 2019-02-26 RX ADMIN — PIPERACILLIN AND TAZOBACTAM SCH MLS/HR: 3; .375 INJECTION, POWDER, LYOPHILIZED, FOR SOLUTION INTRAVENOUS; PARENTERAL at 08:22

## 2019-02-26 RX ADMIN — PIPERACILLIN AND TAZOBACTAM SCH MLS/HR: 3; .375 INJECTION, POWDER, LYOPHILIZED, FOR SOLUTION INTRAVENOUS; PARENTERAL at 21:52

## 2019-02-26 RX ADMIN — PREDNISONE SCH MG: 10 TABLET ORAL at 10:42

## 2019-02-26 RX ADMIN — MAGNESIUM OXIDE TAB 400 MG (241.3 MG ELEMENTAL MG) SCH MG: 400 (241.3 MG) TAB at 17:39

## 2019-02-26 RX ADMIN — PIPERACILLIN AND TAZOBACTAM SCH MLS/HR: 3; .375 INJECTION, POWDER, LYOPHILIZED, FOR SOLUTION INTRAVENOUS; PARENTERAL at 02:03

## 2019-02-26 RX ADMIN — CLOPIDOGREL BISULFATE SCH MG: 75 TABLET, FILM COATED ORAL at 10:43

## 2019-02-26 RX ADMIN — ACETAMINOPHEN PRN MG: 325 TABLET ORAL at 02:33

## 2019-02-26 NOTE — PDOC PROGRESS REPORT
Subjective


Progress Note for:: 02/26/19


Subjective:: 





73-year-old male admitted with sepsis and altered mental status.  Unable to 

swallow anything.  Salazar's catheter shows blood stained urine.  Lactic acid is 

2.2 last night.  Plan is to increase the IV fluids to 150 cc/h do the bladder 

irrigation and repeated lactic acid levels today.  Today's labs are pending.  

Patient states he is in the hospital other than that unable to continue 

communication.  Keep on saying he wants to urinate ,explained to him that he has

a catheter in place.  Overall prognosis poor condition is critical.  Tried to 

call the son to give an update and unable to leave the message.





2/25/2019-patient is more alert more awake today.  Able to give his birthday, he

did know where he is at.  Therapy is working with the patient today.  Patient s

hows significant improvement.  We are going to try to give p.o. medications this

morning.  No acute events in last 24 hours.  Patient is afebrile.





2/26/2019-patient is communicating well no acute events in the last 24 hours.  

Patient is afebrile.  Patient said he had do not have any problems swallowing 

liquids but has a problem swallowing the solid food.  Yesterday he is able to 

take the pills without any problem.  Speech is aware they could reevaluate him 

tomorrow.  No other complaints.


Reason For Visit: 


RENAL STONE








Physical Exam


Vital Signs: 


                                        











Temp Pulse Resp BP Pulse Ox


 


 97.3 F   62   20   130/63 H  92 


 


 02/26/19 08:36  02/26/19 08:36  02/26/19 08:36  02/26/19 08:36  02/26/19 08:36








                                 Intake & Output











 02/25/19 02/26/19 02/27/19





 06:59 06:59 06:59


 


Intake Total 5500 5350 100


 


Output Total 1300 1425 


 


Balance 4200 3925 100


 


Weight 62 kg 63.5 kg 











General appearance: PRESENT: no acute distress


Head exam: PRESENT: atraumatic


Eye exam: PRESENT: PERRLA


Neck exam: ABSENT: carotid bruit, JVD, lymphadenopathy, thyromegaly


Respiratory exam: PRESENT: decreased breath sounds


Cardiovascular exam: PRESENT: tachycardia


GI/Abdominal exam: PRESENT: normal bowel sounds, soft.  ABSENT: distended, 

guarding, mass, organolmegaly, rebound, tenderness


Gentrourinary exam: PRESENT: indwelling catheter, other - Patient still on 

bladder irrigation and blood stained urine in the bag.


Extremities exam: PRESENT: full ROM.  ABSENT: calf tenderness, clubbing, pedal 

edema


Neurological exam: PRESENT: alert, awake, oriented to person, oriented to place,

oriented to time, oriented to situation, CN II-XII grossly intact.  ABSENT: 

motor sensory deficit


Psychiatric exam: PRESENT: appropriate affect, normal mood.  ABSENT: homicidal 

ideation, suicidal ideation





Results


Laboratory Results: 


                                        





                                 02/26/19 06:02 





                                 02/26/19 06:02 





                                        











  02/25/19 02/25/19 02/26/19





  10:50 10:50 06:02


 


WBC  17.8 H  


 


RBC  3.51 L  


 


Hgb  10.8 L  


 


Hct  33.2 L  


 


MCV  94  


 


MCH  30.8  


 


MCHC  32.6  


 


RDW  19.8 H  


 


Plt Count  207  


 


Seg Neutrophils %  Not Reportable  


 


Lymphocytes %  Not Reportable  


 


Monocytes %  Not Reportable  


 


Eosinophils %  Not Reportable  


 


Basophils %  Not Reportable  


 


Absolute Neutrophils  Not Reportable  


 


Absolute Lymphocytes  Not Reportable  


 


Absolute Monocytes  Not Reportable  


 


Absolute Eosinophils  Not Reportable  


 


Absolute Basophils  Not Reportable  


 


Sodium   144.9  143.6


 


Potassium   3.3 L  3.2 L


 


Chloride   115 H  116 H


 


Carbon Dioxide   21 L  24


 


Anion Gap   9  4 L


 


BUN   9  10


 


Creatinine   0.74  0.75


 


Est GFR ( Amer)   > 60  > 60


 


Est GFR (Non-Af Amer)   > 60  > 60


 


Glucose   94  84


 


Calcium   8.9  8.5


 


Magnesium   1.7  1.7


 


Total Bilirubin   3.1 H  2.0 H


 


AST   20  16 L


 


ALT   26  13 L


 


Alkaline Phosphatase   84  61


 


Total Protein   6.1 L  4.9 L


 


Albumin   2.8 L  2.2 L














  02/26/19





  06:02


 


WBC  8.3


 


RBC  2.84 L


 


Hgb  9.0 L


 


Hct  26.9 L


 


MCV  95


 


MCH  31.8


 


MCHC  33.5


 


RDW  20.4 H


 


Plt Count  160


 


Seg Neutrophils %  80.6 H


 


Lymphocytes %  8.2 L


 


Monocytes %  9.2


 


Eosinophils %  1.7


 


Basophils %  0.3


 


Absolute Neutrophils  6.7


 


Absolute Lymphocytes  0.7


 


Absolute Monocytes  0.8


 


Absolute Eosinophils  0.1


 


Absolute Basophils  0.0


 


Sodium 


 


Potassium 


 


Chloride 


 


Carbon Dioxide 


 


Anion Gap 


 


BUN 


 


Creatinine 


 


Est GFR ( Amer) 


 


Est GFR (Non-Af Amer) 


 


Glucose 


 


Calcium 


 


Magnesium 


 


Total Bilirubin 


 


AST 


 


ALT 


 


Alkaline Phosphatase 


 


Total Protein 


 


Albumin 








                                        





02/23/19 10:10   Salazar Catheter   Urine Culture - Final


                            Escherichia Coli Esbl





                                        











  02/23/19 02/23/19 02/23/19





  12:00 18:19 18:19


 


Creatine Kinase   23 L 


 


Troponin I  0.021   0.021














  02/24/19 02/24/19 02/24/19





  00:33 00:33 08:13


 


Creatine Kinase  35 L   24 L


 


Troponin I   0.022 














  02/24/19





  08:13


 


Creatine Kinase 


 


Troponin I  < 0.012











Impressions: 


                                        





Abdomen/Pelvis CT  02/23/19 00:00


IMPRESSION:


1. Unchanged nonobstructing 7 mm stone within the mid left ureter.  New 4 mm 

stone within the left renal pelvis.  Unchanged 1.2 cm stone within the superior 

calyx of the left kidney.  No hydronephrosis.


2. Unchanged positioning of the left ureteral stent.


3. No additional changes when compared to 2/22/2019.


 








Chest X-Ray  02/23/19 09:50


IMPRESSION:  COPD.  NO ACUTE RADIOGRAPHIC FINDING IN THE CHEST.


 








Head CT  02/23/19 11:35


IMPRESSION:  CHRONIC CHANGES OF ATROPHY AND MICROVASCULAR ISCHEMIA.  NO ACUTE 

PROCESS.


EVIDENCE OF ACUTE STROKE: NO.


 














Assessment & Plan





- Diagnosis


(1) Altered mental status


Qualifiers: 


   Altered mental status type: unspecified   Qualified Code(s): R41.82 - Altered

mental status, unspecified   


Is this a current diagnosis for this admission?: Yes   


Plan: 


2/23/2019 patient is going to be admitted for altered mental status most likely 

secondary to sepsis.  Is going to be placed in IMCU.  Started on IV fluids 

normal saline at 100 cc/h, to repeat the lactic acid levels, started on IV Zosyn

and IV vancomycin.  We are going to adjust the doses as per pharmacy 

recommendations.  Blood cultures urine culture was sent.  Aspiration fall 

precautions are requested.  Neurochecks are requested for every 4 hours for the 

next 24 hours.  Swallowing evaluation will be done today.  CT head was negative 

for acute changes.  To start on IV Ativan 1 mg every 8 hours as needed for 

agitation.





2/24/2019-altered mental status/acute encephalopathy most likely secondary to 

sepsis.  Presently he is on IV Zosyn and vancomycin.  Urine cultures blood 

cultures are pending.  CT head was negative.  He failed swallowing evaluation.  

We are going to request for PT OT consult and discharge planner.  Requested for 

neuro checks.  Aspiration fall  Seizure precautions are requested.  Overall 

prognosis poor condition is critical.





2/25/2019 altered mental status/acute encephalopathy most likely secondary to 

sepsis.  Blood cultures are negative so far.  Patient is presently on IV Zosyn 

and vancomycin.  It is much more alert and oriented.  Be going to try to give hi

s morning p.o. medications.  Therapy is working with the patient.  Aspiration 

fall seizure precautions are requested.





2/26/2019-altered mental status/acute encephalopathy most likely secondary to 

sepsis is resolved.  Patient said he has a difficulty in swallowing this morning

we going to reevaluate by placing a speech consult for tomorrow.  Started on 

pured diet from today.








(2) Sepsis


Qualifiers: 


   Sepsis type: sepsis due to unspecified organism   Qualified Code(s): A41.9 - 

Sepsis, unspecified organism   


Is this a current diagnosis for this admission?: Yes   


Plan: 


2/23/2019 patient is in septic stock when he came in blood pressure was 78/50 

with IV fluids latest blood pressure is 124/60 initially on presentation is 

tachycardic heart rate is 106 tachypneic respiratory of 24, lactic acid level is

3.1 with altered mental status.  And is to continue IV fluids normal saline at 

100 cc/h started on IV vancomycin and Zosyn, repeat the lactic acid level this 

evening daily labs.  Blood cultures urine cultures are pending.  Chest x-ray 

does not show any pneumonia.  Urinalysis is cloudy with leukocyte esterase 

positive, WBC more than 5 RBCs 182.  Most likely cause of sepsis is urinary 

tract infection.





2/24/2019-patient's respiratory rate is 31, blood pressure is 124/79 improved.  

Latest lactic acid is 0.2.  Urine cultures blood cultures are pending.  Patient 

is in altered mental status.  In my opinion patient is in septic shock.  Patient

is full code.  Plan is to continue IV fluids, IV Zosyn and IV vancomycin.





2/25/2019-patient's T-max is 98.4 pulse rate is 93.  Blood pressure today is 

138/71.  Plan is to continue the IV antibiotic therapy and to decrease the IV 

infusion of the fluids.  Cultures are pending.  Presently on IV Zosyn and 

vancomycin plan is to continue those antibiotics.





2/26/2019 patient was admitted with septic shock blood cultures are negative 

urine culture shows ESBL coli.  Blood pressure today 130/63 temperature 97.3 

pulse rate 62, WBC count is 8300.  Septic shock is resolved.








(3) Leukocytosis


Qualifiers: 


   Leukocytosis type: unspecified   Qualified Code(s): D72.829 - Elevated white 

blood cell count, unspecified   


Is this a current diagnosis for this admission?: Yes   


Plan: 


2/23/2019 admission WBC count is 10,000 most likely secondary to sepsis.





2/24/2019-yesterday's WBC count is 18,000 today's labs are pending leukocytosis 

most likely secondary to sepsis.





2/25/2019-today's labs are pending.  Admission WBC is 18,000 yesterday WBC came 

down to 14,500.  Plan is to continue the present management.  Leukocytosis is 

resolving.





2/26/2019-on admission WBC count is 18,100 today WBC count is 8300 leukocytosis 

secondary to sepsis is resolved.








(4) COPD (chronic obstructive pulmonary disease)


Is this a current diagnosis for this admission?: Yes   


Plan: 


2/23/2019 patient has history of COPD not on home oxygen he is ex-smoker quit 

smoking 15 years ago.  He uses Brio and albuterol inhaler at home started him on

Xopenex nebulizations every 6 hours as needed.





2/24/2019-patient has history of COPD he is on nebulizer treatments.  He was 

pulse ox are in the 90s.





2/25/2019 patient has history of COPD his pulse ox is 92% on room air. he Is g

etting Xopenex nebulizer treatments.  Plan is to continue the present 

management.





2/26/2019-patient has history of COPD not on home oxygen.  Pulse ox is 82% on 

room air this morning.  He is on Xopenex nebulizations.  Chest x-ray negative 

for pneumonia.  Plan is to continue the present management.








(5) Coronary artery disease


Is this a current diagnosis for this admission?: Yes   


Plan: 


2/23/2019-patient has history of coronary artery disease status post stent 

placement multiple times.  Plan to do the serial cardiac enzymes and troponins. 

Initial troponin is less than 0.021.  Patient is on aspirin Plavix and 

simvastatin at home those medications are going to be resumed.





2/24/2019-patient has history of coronary artery disease previous history of 

multiple stents placement.  At home he is on aspirin, Plavix, simvastatin.  We 

resumed his home medications.  But patient is unable to swallow failed 

swallowing evaluation because of  altered mental status.





2/25/2019-patient has history of coronary artery disease with multiple stent 

placement.  At home he is on aspirin, Plavix, simvastatin plan is to continue 

with his home medications.  Patient failed his swallowing evaluation yesterday 

is much more alert and oriented today hopefully he able to take his oral 

medications.





2/26/2019-patient has history of coronary artery disease status post stent 

placement multiple times.  Patient is on aspirin, Plavix, and simvastatin.  

Resumed those   medications during the hospital stay.








(6) Rheumatoid arthritis


Is this a current diagnosis for this admission?: Yes   


Plan: 


2/23/2019 patient has history of rheumatoid arthritis on methotrexate and 

prednisone at home those medications are restarted again.





2/24/2019-patient has history of rheumatoid arthritis on methotrexate and p.o. 

prednisone at home.  Those medications are resumed but the patient is unable to 

swallow the pills at this moment.





2/25/2019-patient has history of rheumatoid arthritis on methotrexate and 

prednisone at home, meds will be restarted once able to swallow the medications.





2/26/2019-patient has history of rheumatoid arthritis on methotrexate and 

prednisone these medications are restarted during the hospital stay.








(7) Paroxysmal atrial fibrillation


Is this a current diagnosis for this admission?: Yes   


Plan: 


2/23/2019-patient has history of paroxysmal atrial fibrillation on warfarin 1 mg

p.o. daily, Plavix 75 mg p.o. daily, aspirin 81 mg p.o. daily these medications 

he is taking at home plan is to resume those medications in the hospital.





2/24/2019-patient is giving the history of his dad having paroxysmal atrial 

fibrillation, patient in sinus rhythm here.  We resumed his warfarin yesterday 

but is unable to take any oral medications at this point.





2/25/2019-patient is has a history of paroxysmal atrial fibrillation he is in 

sinus rhythm he is on warfarin at home.  We are going to check his PT/INR on 

daily basis.





2/26/2018-patient has history of paroxysmal atrial fibrillation, during his 

hospital stay he is on sinus rhythm.  According to his son warfarin was 

discontinued recently.  INR today is 1.71.  As per the son's request we going to

discontinue warfarin.  Be placed on a Lovenox 40 mg subcu daily.








(8) History of AAA (abdominal aortic aneurysm) repair


Is this a current diagnosis for this admission?: Yes   


Plan: 


2/23/2019 patient has history of abdominal aortic aneurysm status post graft 

placement in 2010 as per the patient's son.





2/24/2019 patient has history of aortic aneurysm status post graft placement in 

2010.  CT scan of the abdomen does not show any abnormalities.








2/26/2019-patient has history of abdominal aortic aneurysm status post graft 

placement in 2010.  CT scan during this time patient does not show any acute 

abnormalities.








(9) BPH (benign prostatic hyperplasia)


Is this a current diagnosis for this admission?: Yes   


Plan: 


2/23/2019 patient has history of BPH on finasteride and Flomax at home those 

medications are restarted during this hospital stay.





2/24/2019 patient has history of BPH on finasteride and Flomax at home those 

medications are resumed but patient is unable to swallow any medications at this

moment patient has a Salazar's catheter draining blood stained urine.  Overall 

prognosis and condition again critical.





2/25/2019 patient has history of BPH and he is on finasteride and Flomax at home

plan is to resume those medications.





2/26/2019-patient history of BPH he is on finasteride and Flomax at home those 

medications are restarted during this hospital stay.








(10) UTI (urinary tract infection)


Is this a current diagnosis for this admission?: Yes   


Plan: 


2/25/2019-patient has a urinary stent placement at [facility].  He came in 

with septic shock.  Most likely source of infection in the urine.  Urine 

analysis initially shows cloudy urine with increased WBC and RBC.  Urine culture

is pending.  Presently on IV vancomycin and Zosyn plan is to continue those 

medications.





2/26/2019-patient has recent history of a urinary stent placement.  Followed by 

at least 2 urinary tract infections.  He was admitted here for sepsis.  Urine cu

lture is positive for ESBL E. coli and on Zosyn.  Patient is afebrile.  Plan is 

to continue the present management.








- Time


Time Spent with patient: 15-24 minutes


Medications reviewed and adjusted accordingly: Yes


Anticipated discharge: SNF

## 2019-02-27 LAB
ALBUMIN SERPL-MCNC: 2.4 G/DL (ref 3.5–5)
ALP SERPL-CCNC: 63 U/L (ref 38–126)
ALT SERPL-CCNC: 17 U/L (ref 21–72)
ANION GAP SERPL CALC-SCNC: 5 MMOL/L (ref 5–19)
AST SERPL-CCNC: 17 U/L (ref 17–59)
BILIRUB DIRECT SERPL-MCNC: 0.9 MG/DL (ref 0–0.4)
BILIRUB SERPL-MCNC: 1.9 MG/DL (ref 0.2–1.3)
BUN SERPL-MCNC: 9 MG/DL (ref 7–20)
CALCIUM: 8.6 MG/DL (ref 8.4–10.2)
CHLORIDE SERPL-SCNC: 119 MMOL/L (ref 98–107)
CO2 SERPL-SCNC: 22 MMOL/L (ref 22–30)
ERYTHROCYTE [DISTWIDTH] IN BLOOD BY AUTOMATED COUNT: 20.1 % (ref 11.5–14)
GLUCOSE SERPL-MCNC: 79 MG/DL (ref 75–110)
HCT VFR BLD CALC: 30.2 % (ref 37.9–51)
HGB BLD-MCNC: 10.2 G/DL (ref 13.5–17)
MCH RBC QN AUTO: 31.5 PG (ref 27–33.4)
MCHC RBC AUTO-ENTMCNC: 33.7 G/DL (ref 32–36)
MCV RBC AUTO: 93 FL (ref 80–97)
PLATELET # BLD: 159 10^3/UL (ref 150–450)
POTASSIUM SERPL-SCNC: 3.6 MMOL/L (ref 3.6–5)
PROT SERPL-MCNC: 5.3 G/DL (ref 6.3–8.2)
RBC # BLD AUTO: 3.23 10^6/UL (ref 4.35–5.55)
SODIUM SERPL-SCNC: 146.2 MMOL/L (ref 137–145)
WBC # BLD AUTO: 7.1 10^3/UL (ref 4–10.5)

## 2019-02-27 RX ADMIN — PREDNISONE SCH MG: 10 TABLET ORAL at 09:39

## 2019-02-27 RX ADMIN — ENOXAPARIN SODIUM SCH MG: 40 INJECTION SUBCUTANEOUS at 21:16

## 2019-02-27 RX ADMIN — PIPERACILLIN AND TAZOBACTAM SCH MLS/HR: 3; .375 INJECTION, POWDER, LYOPHILIZED, FOR SOLUTION INTRAVENOUS; PARENTERAL at 02:44

## 2019-02-27 RX ADMIN — MORPHINE SULFATE PRN MG: 10 INJECTION INTRAMUSCULAR; INTRAVENOUS; SUBCUTANEOUS at 17:52

## 2019-02-27 RX ADMIN — MORPHINE SULFATE PRN MG: 10 INJECTION INTRAMUSCULAR; INTRAVENOUS; SUBCUTANEOUS at 09:39

## 2019-02-27 RX ADMIN — ASPIRIN SCH MG: 81 TABLET, CHEWABLE ORAL at 09:39

## 2019-02-27 RX ADMIN — MAGNESIUM OXIDE TAB 400 MG (241.3 MG ELEMENTAL MG) SCH MG: 400 (241.3 MG) TAB at 17:52

## 2019-02-27 RX ADMIN — FAMOTIDINE SCH MG: 20 TABLET, FILM COATED ORAL at 21:15

## 2019-02-27 RX ADMIN — MORPHINE SULFATE PRN MG: 10 INJECTION INTRAMUSCULAR; INTRAVENOUS; SUBCUTANEOUS at 22:28

## 2019-02-27 RX ADMIN — SIMETHICONE PRN MG: 80 TABLET, CHEWABLE ORAL at 12:37

## 2019-02-27 RX ADMIN — MORPHINE SULFATE PRN MG: 10 INJECTION INTRAMUSCULAR; INTRAVENOUS; SUBCUTANEOUS at 14:15

## 2019-02-27 RX ADMIN — POTASSIUM CHLORIDE SCH MEQ: 750 TABLET, FILM COATED, EXTENDED RELEASE ORAL at 09:39

## 2019-02-27 RX ADMIN — FINASTERIDE SCH MG: 5 TABLET, FILM COATED ORAL at 09:38

## 2019-02-27 RX ADMIN — SIMVASTATIN SCH MG: 40 TABLET, FILM COATED ORAL at 21:14

## 2019-02-27 RX ADMIN — PIPERACILLIN AND TAZOBACTAM SCH MLS/HR: 3; .375 INJECTION, POWDER, LYOPHILIZED, FOR SOLUTION INTRAVENOUS; PARENTERAL at 14:18

## 2019-02-27 RX ADMIN — PIPERACILLIN AND TAZOBACTAM SCH MLS/HR: 3; .375 INJECTION, POWDER, LYOPHILIZED, FOR SOLUTION INTRAVENOUS; PARENTERAL at 22:16

## 2019-02-27 RX ADMIN — PIPERACILLIN AND TAZOBACTAM SCH MLS/HR: 3; .375 INJECTION, POWDER, LYOPHILIZED, FOR SOLUTION INTRAVENOUS; PARENTERAL at 10:06

## 2019-02-27 RX ADMIN — SOTALOL HYDROCHLORIDE SCH MG: 80 TABLET ORAL at 21:15

## 2019-02-27 RX ADMIN — MAGNESIUM OXIDE TAB 400 MG (241.3 MG ELEMENTAL MG) SCH MG: 400 (241.3 MG) TAB at 09:38

## 2019-02-27 RX ADMIN — MORPHINE SULFATE PRN MG: 10 INJECTION INTRAMUSCULAR; INTRAVENOUS; SUBCUTANEOUS at 05:37

## 2019-02-27 RX ADMIN — FAMOTIDINE SCH MG: 20 TABLET, FILM COATED ORAL at 09:39

## 2019-02-27 RX ADMIN — SOTALOL HYDROCHLORIDE SCH MG: 80 TABLET ORAL at 09:38

## 2019-02-27 RX ADMIN — CLOPIDOGREL BISULFATE SCH MG: 75 TABLET, FILM COATED ORAL at 09:39

## 2019-02-27 RX ADMIN — SIMETHICONE PRN MG: 80 TABLET, CHEWABLE ORAL at 17:52

## 2019-02-27 RX ADMIN — TAMSULOSIN HYDROCHLORIDE SCH MG: 0.4 CAPSULE ORAL at 21:15

## 2019-02-27 RX ADMIN — SIMETHICONE PRN MG: 80 TABLET, CHEWABLE ORAL at 04:45

## 2019-02-27 NOTE — PDOC PROGRESS REPORT
Subjective


Progress Note for:: 02/27/19


Subjective:: 





No adverse events overnight.  No new complaints.  Vital signs are stable.  He 

remains on continuous bladder irrigation, draining cherry red urine.  He denies 

any abdominal pain at this point.


Reason For Visit: 


RENAL STONE








Physical Exam


Vital Signs: 


                                        











Temp Pulse Resp BP Pulse Ox


 


 98.1 F   59 L  18   124/71   93 


 


 02/27/19 15:45  02/27/19 15:45  02/27/19 15:45  02/27/19 15:45  02/27/19 15:45








                                 Intake & Output











 02/26/19 02/27/19 02/28/19





 06:59 06:59 06:59


 


Intake Total 5350 4699 200


 


Output Total 1425 4275 1450


 


Balance 3925 424 -1250


 


Weight 63.5 kg 62.4 kg 








General appearance: PRESENT: no acute distress


Respiratory exam: PRESENT: decreased breath sounds


Cardiovascular exam: PRESENT: RRR


GI/Abdominal exam: PRESENT: normal bowel sounds, soft.  ABSENT: distended, 

guarding, mass, organolmegaly, rebound, tenderness


Gentrourinary exam: PRESENT: indwelling catheter, other - Patient still on 

bladder irrigation and blood stained urine in the bag.


Extremities exam: PRESENT: full ROM.  ABSENT: calf tenderness, clubbing, pedal 

edema


Neurological exam: PRESENT: alert, awake, oriented to person, oriented to place,

oriented to time, oriented to situation, CN II-XII grossly intact.  ABSENT: 

motor sensory deficit


Psychiatric exam: PRESENT: appropriate affect, normal mood.





Results


Laboratory Results: 


                                        





                                 02/27/19 05:13 





                                 02/27/19 05:13 





                                        











  02/27/19 02/27/19





  05:13 05:13


 


WBC  7.1 


 


RBC  3.23 L 


 


Hgb  10.2 L 


 


Hct  30.2 L 


 


MCV  93 


 


MCH  31.5 


 


MCHC  33.7 


 


RDW  20.1 H 


 


Plt Count  159 


 


Sodium   146.2 H


 


Potassium   3.6


 


Chloride   119 H


 


Carbon Dioxide   22


 


Anion Gap   5


 


BUN   9


 


Creatinine   0.72


 


Est GFR ( Amer)   > 60


 


Est GFR (Non-Af Amer)   > 60


 


Glucose   79


 


Calcium   8.6


 


Magnesium   1.8


 


Total Bilirubin   1.9 H


 


AST   17


 


ALT   17 L


 


Alkaline Phosphatase   63


 


Total Protein   5.3 L


 


Albumin   2.4 L








                                        











  02/23/19 02/23/19 02/23/19





  12:00 18:19 18:19


 


Creatine Kinase   23 L 


 


Troponin I  0.021   0.021














  02/24/19 02/24/19 02/24/19





  00:33 00:33 08:13


 


Creatine Kinase  35 L   24 L


 


Troponin I   0.022 














  02/24/19





  08:13


 


Creatine Kinase 


 


Troponin I  < 0.012











Impressions: 


                                        





Abdomen/Pelvis CT  02/23/19 00:00


IMPRESSION:


1. Unchanged nonobstructing 7 mm stone within the mid left ureter.  New 4 mm 

stone within the left renal pelvis.  Unchanged 1.2 cm stone within the superior 

calyx of the left kidney.  No hydronephrosis.


2. Unchanged positioning of the left ureteral stent.


3. No additional changes when compared to 2/22/2019.


 








Chest X-Ray  02/23/19 09:50


IMPRESSION:  COPD.  NO ACUTE RADIOGRAPHIC FINDING IN THE CHEST.


 








Head CT  02/23/19 11:35


IMPRESSION:  CHRONIC CHANGES OF ATROPHY AND MICROVASCULAR ISCHEMIA.  NO ACUTE 

PROCESS.


EVIDENCE OF ACUTE STROKE: NO.


 














Assessment & Plan





- Diagnosis


(1) Altered mental status


Qualifiers: 


   Altered mental status type: unspecified   Qualified Code(s): R41.82 - Altered

mental status, unspecified   


Is this a current diagnosis for this admission?: Yes   


Plan: 


Metabolic encephalopathy due to UTI resolved








(2) Sepsis


Qualifiers: 


   Sepsis type: Escherichia coli   Qualified Code(s): A41.51 - Sepsis due to 

Escherichia coli [E. coli]   


Is this a current diagnosis for this admission?: Yes   


Plan: 


Due to ESBL producing E. coli in the urine.  Currently on Zosyn.  Blood cultures

are negative.








(3) UTI (urinary tract infection)


Qualifiers: 


   Urinary tract infection type: acute cystitis   Hematuria presence: with 

hematuria   Qualified Code(s): N30.01 - Acute cystitis with hematuria   


Is this a current diagnosis for this admission?: Yes   


Plan: 


ESBL producing E. coli is growing out of the urine.  Currently on Zosyn.  Also 

on continuous bladder irrigation, which will continue until the intensity of the

redness has diminished.








(4) Paroxysmal atrial fibrillation


Is this a current diagnosis for this admission?: Yes   


Plan: 


Currently in a sinus rhythm.  Had been on Coumadin according to his son, he does

not want him to continue taking that.  Currently on Lovenox for DVT prophylaxis,

will revisit anticoagulation on him when he is getting closer to discharge in 

light of his current bleeding.








- Time


Time Spent with patient: 25-34 minutes

## 2019-02-28 LAB
ANION GAP SERPL CALC-SCNC: 7 MMOL/L (ref 5–19)
BUN SERPL-MCNC: 8 MG/DL (ref 7–20)
CALCIUM: 8.5 MG/DL (ref 8.4–10.2)
CHLORIDE SERPL-SCNC: 116 MMOL/L (ref 98–107)
CO2 SERPL-SCNC: 21 MMOL/L (ref 22–30)
ERYTHROCYTE [DISTWIDTH] IN BLOOD BY AUTOMATED COUNT: 19.6 % (ref 11.5–14)
GLUCOSE SERPL-MCNC: 115 MG/DL (ref 75–110)
HCT VFR BLD CALC: 28.9 % (ref 37.9–51)
HGB BLD-MCNC: 9.5 G/DL (ref 13.5–17)
MCH RBC QN AUTO: 31.1 PG (ref 27–33.4)
MCHC RBC AUTO-ENTMCNC: 32.8 G/DL (ref 32–36)
MCV RBC AUTO: 95 FL (ref 80–97)
PLATELET # BLD: 160 10^3/UL (ref 150–450)
POTASSIUM SERPL-SCNC: 3.6 MMOL/L (ref 3.6–5)
RBC # BLD AUTO: 3.05 10^6/UL (ref 4.35–5.55)
SODIUM SERPL-SCNC: 144.2 MMOL/L (ref 137–145)
WBC # BLD AUTO: 7.1 10^3/UL (ref 4–10.5)

## 2019-02-28 RX ADMIN — MORPHINE SULFATE PRN MG: 10 INJECTION INTRAMUSCULAR; INTRAVENOUS; SUBCUTANEOUS at 19:27

## 2019-02-28 RX ADMIN — MORPHINE SULFATE PRN MG: 10 INJECTION INTRAMUSCULAR; INTRAVENOUS; SUBCUTANEOUS at 02:47

## 2019-02-28 RX ADMIN — SIMETHICONE PRN MG: 80 TABLET, CHEWABLE ORAL at 15:03

## 2019-02-28 RX ADMIN — SOTALOL HYDROCHLORIDE SCH MG: 80 TABLET ORAL at 09:13

## 2019-02-28 RX ADMIN — ACETAMINOPHEN PRN MG: 325 TABLET ORAL at 09:12

## 2019-02-28 RX ADMIN — ENOXAPARIN SODIUM SCH MG: 40 INJECTION SUBCUTANEOUS at 21:09

## 2019-02-28 RX ADMIN — PIPERACILLIN AND TAZOBACTAM SCH MLS/HR: 3; .375 INJECTION, POWDER, LYOPHILIZED, FOR SOLUTION INTRAVENOUS; PARENTERAL at 21:09

## 2019-02-28 RX ADMIN — SIMETHICONE PRN MG: 80 TABLET, CHEWABLE ORAL at 00:10

## 2019-02-28 RX ADMIN — FAMOTIDINE SCH MG: 20 TABLET, FILM COATED ORAL at 21:10

## 2019-02-28 RX ADMIN — PIPERACILLIN AND TAZOBACTAM SCH MLS/HR: 3; .375 INJECTION, POWDER, LYOPHILIZED, FOR SOLUTION INTRAVENOUS; PARENTERAL at 09:14

## 2019-02-28 RX ADMIN — ASPIRIN SCH MG: 81 TABLET, CHEWABLE ORAL at 09:13

## 2019-02-28 RX ADMIN — BACLOFEN PRN MG: 10 TABLET ORAL at 04:20

## 2019-02-28 RX ADMIN — CLOPIDOGREL BISULFATE SCH MG: 75 TABLET, FILM COATED ORAL at 09:12

## 2019-02-28 RX ADMIN — FAMOTIDINE SCH MG: 20 TABLET, FILM COATED ORAL at 09:13

## 2019-02-28 RX ADMIN — MAGNESIUM OXIDE TAB 400 MG (241.3 MG ELEMENTAL MG) SCH MG: 400 (241.3 MG) TAB at 09:13

## 2019-02-28 RX ADMIN — SIMVASTATIN SCH MG: 40 TABLET, FILM COATED ORAL at 21:10

## 2019-02-28 RX ADMIN — MAGNESIUM OXIDE TAB 400 MG (241.3 MG ELEMENTAL MG) SCH MG: 400 (241.3 MG) TAB at 17:07

## 2019-02-28 RX ADMIN — PIPERACILLIN AND TAZOBACTAM SCH MLS/HR: 3; .375 INJECTION, POWDER, LYOPHILIZED, FOR SOLUTION INTRAVENOUS; PARENTERAL at 15:03

## 2019-02-28 RX ADMIN — MORPHINE SULFATE PRN MG: 10 INJECTION INTRAMUSCULAR; INTRAVENOUS; SUBCUTANEOUS at 07:36

## 2019-02-28 RX ADMIN — ACETAMINOPHEN PRN MG: 325 TABLET ORAL at 17:07

## 2019-02-28 RX ADMIN — PIPERACILLIN AND TAZOBACTAM SCH MLS/HR: 3; .375 INJECTION, POWDER, LYOPHILIZED, FOR SOLUTION INTRAVENOUS; PARENTERAL at 02:37

## 2019-02-28 RX ADMIN — SOTALOL HYDROCHLORIDE SCH MG: 80 TABLET ORAL at 21:10

## 2019-02-28 RX ADMIN — SIMETHICONE PRN MG: 80 TABLET, CHEWABLE ORAL at 06:12

## 2019-02-28 RX ADMIN — TAMSULOSIN HYDROCHLORIDE SCH MG: 0.4 CAPSULE ORAL at 21:10

## 2019-02-28 RX ADMIN — PREDNISONE SCH MG: 10 TABLET ORAL at 09:12

## 2019-02-28 RX ADMIN — MORPHINE SULFATE PRN MG: 10 INJECTION INTRAMUSCULAR; INTRAVENOUS; SUBCUTANEOUS at 13:50

## 2019-02-28 RX ADMIN — FINASTERIDE SCH MG: 5 TABLET, FILM COATED ORAL at 09:12

## 2019-02-28 RX ADMIN — POTASSIUM CHLORIDE SCH MEQ: 750 TABLET, FILM COATED, EXTENDED RELEASE ORAL at 09:12

## 2019-02-28 NOTE — PDOC PROGRESS REPORT
Subjective


Progress Note for:: 02/28/19


Subjective:: 





No adverse events overnight.  No new complaints.  He is eating a modified diet 

and tolerating it well.  He said he had a ureteral stent placed by urologist in 

[place] but does not know the name of the person.  He does not remember when it

was done.  He wanted me to get a four-way phone conversation between him, his 

son, the urologist whose name he does not know, and myself, or there would be 

"hell to pay."  The urine that is draining is still red, but it is not as 

intense as it was yesterday.


Reason For Visit: 


RENAL STONE








Physical Exam


Vital Signs: 


                                        











Temp Pulse Resp BP Pulse Ox


 


 97.6 F   66   16   127/72 H  95 


 


 02/28/19 12:17  02/28/19 14:00  02/28/19 12:17  02/28/19 12:17  02/28/19 12:17








                                 Intake & Output











 02/27/19 02/28/19 03/01/19





 06:59 06:59 06:59


 


Intake Total 4699 4622 659


 


Output Total 4275 8650 900


 


Balance 424 -4028 -241


 


Weight 62.4 kg 73 kg 








General appearance: PRESENT: no acute distress


Respiratory exam: PRESENT: decreased breath sounds


Cardiovascular exam: PRESENT: RRR


GI/Abdominal exam: PRESENT: normal bowel sounds, soft.  ABSENT: distended, 

guarding, mass, organolmegaly, rebound, tenderness


Gentrourinary exam: PRESENT: indwelling catheter, other - Patient still on 

bladder irrigation and blood stained urine in the bag.


Extremities exam: PRESENT: full ROM.  ABSENT: calf tenderness, clubbing, pedal 

edema


Neurological exam: PRESENT: alert, awake, oriented to person, oriented to place,

oriented to time, oriented to situation, CN II-XII grossly intact.  ABSENT: 

motor sensory deficit


Psychiatric exam: PRESENT: appropriate affect, normal mood.  Cantankerous





Results


Laboratory Results: 


                                        





                                 02/28/19 10:10 





                                 02/28/19 10:10 





                                        











  02/28/19 02/28/19





  10:10 10:10


 


WBC  7.1 


 


RBC  3.05 L 


 


Hgb  9.5 L 


 


Hct  28.9 L 


 


MCV  95 


 


MCH  31.1 


 


MCHC  32.8 


 


RDW  19.6 H 


 


Plt Count  160 


 


Sodium   144.2


 


Potassium   3.6


 


Chloride   116 H


 


Carbon Dioxide   21 L


 


Anion Gap   7


 


BUN   8


 


Creatinine   0.80


 


Est GFR ( Amer)   > 60


 


Est GFR (Non-Af Amer)   > 60


 


Glucose   115 H


 


Calcium   8.5








                                        





02/23/19 08:55   Blood   Blood Culture - Final


                            NO GROWTH IN 5 DAYS


02/23/19 08:30   Blood   Blood Culture - Final


                            NO GROWTH IN 5 DAYS





                                        











  02/23/19 02/23/19 02/23/19





  12:00 18:19 18:19


 


Creatine Kinase   23 L 


 


Troponin I  0.021   0.021














  02/24/19 02/24/19 02/24/19





  00:33 00:33 08:13


 


Creatine Kinase  35 L   24 L


 


Troponin I   0.022 














  02/24/19





  08:13


 


Creatine Kinase 


 


Troponin I  < 0.012











Impressions: 


                                        





Abdomen/Pelvis CT  02/23/19 00:00


IMPRESSION:


1. Unchanged nonobstructing 7 mm stone within the mid left ureter.  New 4 mm 

stone within the left renal pelvis.  Unchanged 1.2 cm stone within the superior 

calyx of the left kidney.  No hydronephrosis.


2. Unchanged positioning of the left ureteral stent.


3. No additional changes when compared to 2/22/2019.


 








Chest X-Ray  02/23/19 09:50


IMPRESSION:  COPD.  NO ACUTE RADIOGRAPHIC FINDING IN THE CHEST.


 








Head CT  02/23/19 11:35


IMPRESSION:  CHRONIC CHANGES OF ATROPHY AND MICROVASCULAR ISCHEMIA.  NO ACUTE 

PROCESS.


EVIDENCE OF ACUTE STROKE: NO.


 














Assessment & Plan





- Diagnosis


(1) Altered mental status


Qualifiers: 


   Altered mental status type: unspecified   Qualified Code(s): R41.82 - Altered

mental status, unspecified   


Is this a current diagnosis for this admission?: Yes   


Plan: 


Metabolic encephalopathy due to UTI resolved








(2) Sepsis


Qualifiers: 


   Sepsis type: Escherichia coli   Qualified Code(s): A41.51 - Sepsis due to 

Escherichia coli [E. coli]   


Is this a current diagnosis for this admission?: Yes   


Plan: 


Due to ESBL producing E. coli in the urine.  Currently on Zosyn.  Blood cultures

are negative.








(3) UTI (urinary tract infection)


Qualifiers: 


   Urinary tract infection type: acute cystitis   Hematuria presence: with 

hematuria   Qualified Code(s): N30.01 - Acute cystitis with hematuria   


Is this a current diagnosis for this admission?: Yes   


Plan: 


ESBL producing E. coli is growing out of the urine.  Currently on Zosyn.  Also 

on continuous bladder irrigation, which will continue until the intensity of the

redness has diminished.








(4) Paroxysmal atrial fibrillation


Is this a current diagnosis for this admission?: Yes   


Plan: 


Currently in a sinus rhythm.  Had been on Coumadin according to his son, he does

not want him to continue taking that.  Currently on Lovenox for DVT prophylaxis,

will revisit anticoagulation on him when he is getting closer to discharge in 

light of his current bleeding.








- Time


Time Spent with patient: 25-34 minutes

## 2019-03-01 LAB
ERYTHROCYTE [DISTWIDTH] IN BLOOD BY AUTOMATED COUNT: 19.6 % (ref 11.5–14)
HCT VFR BLD CALC: 31.1 % (ref 37.9–51)
HGB BLD-MCNC: 10.4 G/DL (ref 13.5–17)
MCH RBC QN AUTO: 31.6 PG (ref 27–33.4)
MCHC RBC AUTO-ENTMCNC: 33.4 G/DL (ref 32–36)
MCV RBC AUTO: 95 FL (ref 80–97)
PLATELET # BLD: 150 10^3/UL (ref 150–450)
RBC # BLD AUTO: 3.28 10^6/UL (ref 4.35–5.55)
WBC # BLD AUTO: 7.4 10^3/UL (ref 4–10.5)

## 2019-03-01 RX ADMIN — SIMETHICONE PRN MG: 80 TABLET, CHEWABLE ORAL at 08:25

## 2019-03-01 RX ADMIN — ENOXAPARIN SODIUM SCH: 40 INJECTION SUBCUTANEOUS at 21:04

## 2019-03-01 RX ADMIN — PIPERACILLIN AND TAZOBACTAM SCH MLS/HR: 3; .375 INJECTION, POWDER, LYOPHILIZED, FOR SOLUTION INTRAVENOUS; PARENTERAL at 08:33

## 2019-03-01 RX ADMIN — PIPERACILLIN AND TAZOBACTAM SCH MLS/HR: 3; .375 INJECTION, POWDER, LYOPHILIZED, FOR SOLUTION INTRAVENOUS; PARENTERAL at 21:11

## 2019-03-01 RX ADMIN — MORPHINE SULFATE PRN MG: 10 INJECTION INTRAMUSCULAR; INTRAVENOUS; SUBCUTANEOUS at 21:44

## 2019-03-01 RX ADMIN — SOTALOL HYDROCHLORIDE SCH MG: 80 TABLET ORAL at 10:51

## 2019-03-01 RX ADMIN — POTASSIUM CHLORIDE SCH MEQ: 750 TABLET, FILM COATED, EXTENDED RELEASE ORAL at 10:50

## 2019-03-01 RX ADMIN — FAMOTIDINE SCH MG: 20 TABLET, FILM COATED ORAL at 21:05

## 2019-03-01 RX ADMIN — CLOPIDOGREL BISULFATE SCH MG: 75 TABLET, FILM COATED ORAL at 10:50

## 2019-03-01 RX ADMIN — FINASTERIDE SCH MG: 5 TABLET, FILM COATED ORAL at 10:50

## 2019-03-01 RX ADMIN — MORPHINE SULFATE PRN MG: 10 INJECTION INTRAMUSCULAR; INTRAVENOUS; SUBCUTANEOUS at 17:34

## 2019-03-01 RX ADMIN — PIPERACILLIN AND TAZOBACTAM SCH MLS/HR: 3; .375 INJECTION, POWDER, LYOPHILIZED, FOR SOLUTION INTRAVENOUS; PARENTERAL at 03:33

## 2019-03-01 RX ADMIN — TAMSULOSIN HYDROCHLORIDE SCH MG: 0.4 CAPSULE ORAL at 21:05

## 2019-03-01 RX ADMIN — MORPHINE SULFATE PRN MG: 10 INJECTION INTRAMUSCULAR; INTRAVENOUS; SUBCUTANEOUS at 13:18

## 2019-03-01 RX ADMIN — PREDNISONE SCH MG: 10 TABLET ORAL at 10:51

## 2019-03-01 RX ADMIN — SIMETHICONE PRN MG: 80 TABLET, CHEWABLE ORAL at 13:19

## 2019-03-01 RX ADMIN — MORPHINE SULFATE PRN MG: 10 INJECTION INTRAMUSCULAR; INTRAVENOUS; SUBCUTANEOUS at 03:33

## 2019-03-01 RX ADMIN — BACLOFEN PRN MG: 10 TABLET ORAL at 10:50

## 2019-03-01 RX ADMIN — MORPHINE SULFATE PRN MG: 10 INJECTION INTRAMUSCULAR; INTRAVENOUS; SUBCUTANEOUS at 08:25

## 2019-03-01 RX ADMIN — BACLOFEN PRN MG: 10 TABLET ORAL at 21:05

## 2019-03-01 RX ADMIN — MAGNESIUM OXIDE TAB 400 MG (241.3 MG ELEMENTAL MG) SCH MG: 400 (241.3 MG) TAB at 10:50

## 2019-03-01 RX ADMIN — MAGNESIUM OXIDE TAB 400 MG (241.3 MG ELEMENTAL MG) SCH MG: 400 (241.3 MG) TAB at 17:00

## 2019-03-01 RX ADMIN — SIMETHICONE PRN MG: 80 TABLET, CHEWABLE ORAL at 03:41

## 2019-03-01 RX ADMIN — SOTALOL HYDROCHLORIDE SCH MG: 80 TABLET ORAL at 21:05

## 2019-03-01 RX ADMIN — ASPIRIN SCH MG: 81 TABLET, CHEWABLE ORAL at 10:50

## 2019-03-01 RX ADMIN — FAMOTIDINE SCH MG: 20 TABLET, FILM COATED ORAL at 10:50

## 2019-03-01 RX ADMIN — SENNOSIDES, DOCUSATE SODIUM SCH EACH: 50; 8.6 TABLET, FILM COATED ORAL at 15:18

## 2019-03-01 RX ADMIN — PIPERACILLIN AND TAZOBACTAM SCH MLS/HR: 3; .375 INJECTION, POWDER, LYOPHILIZED, FOR SOLUTION INTRAVENOUS; PARENTERAL at 15:18

## 2019-03-01 RX ADMIN — SIMVASTATIN SCH MG: 40 TABLET, FILM COATED ORAL at 21:05

## 2019-03-01 RX ADMIN — BACLOFEN PRN MG: 10 TABLET ORAL at 15:18

## 2019-03-01 NOTE — PDOC PROGRESS REPORT
Subjective


Progress Note for:: 03/01/19


Subjective:: 





No adverse events overnight.  No new complaints.  Vital signs been stable.  He 

is been afebrile.  He is been tolerating his modified diet without any 

difficulty.  He is got a congested sounding cough but is not doing a whole lot 

of deep breathing and I encouraged him to use an incentive spirometer.  His 

bladder irrigant is still blood-tinged but nowhere near as intense as in 

previous days.


Reason For Visit: 


RENAL STONE








Physical Exam


Vital Signs: 


                                        











Temp Pulse Resp BP Pulse Ox


 


 97.8 F   64   19   131/79 H  95 


 


 03/01/19 09:27  03/01/19 09:27  03/01/19 09:27  03/01/19 09:27  03/01/19 09:27








                                 Intake & Output











 02/28/19 03/01/19 03/02/19





 06:59 06:59 06:59


 


Intake Total 4622 1081 100


 


Output Total 8650 1250 


 


Balance -4028 -169 100


 


Weight 73 kg 72 kg 








General appearance: PRESENT: no acute distress


Respiratory exam: PRESENT: decreased breath sounds


Cardiovascular exam: PRESENT: RRR


GI/Abdominal exam: PRESENT: normal bowel sounds, soft.  ABSENT: distended, 

guarding, mass, organolmegaly, rebound, tenderness


Gentrourinary exam: PRESENT: indwelling catheter, other - Patient still on 

bladder irrigation and blood stained urine in the bag more of a pink tint than a

red.


Extremities exam: PRESENT: full ROM.  ABSENT: calf tenderness, clubbing, pedal 

edema


Neurological exam: PRESENT: alert, awake, oriented to person, oriented to place,

oriented to time, oriented to situation


Psychiatric exam: PRESENT: appropriate affect, normal mood.





Results


Laboratory Results: 


                                        





                                 03/01/19 05:37 





                                 02/28/19 10:10 





                                        











  03/01/19





  05:37


 


WBC  7.4


 


RBC  3.28 L


 


Hgb  10.4 L


 


Hct  31.1 L


 


MCV  95


 


MCH  31.6


 


MCHC  33.4


 


RDW  19.6 H


 


Plt Count  150








                                        











  02/23/19 02/23/19 02/23/19





  12:00 18:19 18:19


 


Creatine Kinase   23 L 


 


Troponin I  0.021   0.021














  02/24/19 02/24/19 02/24/19





  00:33 00:33 08:13


 


Creatine Kinase  35 L   24 L


 


Troponin I   0.022 














  02/24/19





  08:13


 


Creatine Kinase 


 


Troponin I  < 0.012











Impressions: 


                                        





Abdomen/Pelvis CT  02/23/19 00:00


IMPRESSION:


1. Unchanged nonobstructing 7 mm stone within the mid left ureter.  New 4 mm 

stone within the left renal pelvis.  Unchanged 1.2 cm stone within the superior 

calyx of the left kidney.  No hydronephrosis.


2. Unchanged positioning of the left ureteral stent.


3. No additional changes when compared to 2/22/2019.


 








Chest X-Ray  02/23/19 09:50


IMPRESSION:  COPD.  NO ACUTE RADIOGRAPHIC FINDING IN THE CHEST.


 








Head CT  02/23/19 11:35


IMPRESSION:  CHRONIC CHANGES OF ATROPHY AND MICROVASCULAR ISCHEMIA.  NO ACUTE 

PROCESS.


EVIDENCE OF ACUTE STROKE: NO.


 














Assessment & Plan





- Diagnosis


(1) Sepsis


Qualifiers: 


   Sepsis type: Escherichia coli   Qualified Code(s): A41.51 - Sepsis due to 

Escherichia coli [E. coli]   


Is this a current diagnosis for this admission?: Yes   


Plan: 


Sepsis has resolved.  This was due to an ESBL producing E. coli UTI.  Continues 

on Zosyn.








(2) UTI (urinary tract infection)


Qualifiers: 


   Urinary tract infection type: acute cystitis   Hematuria presence: with 

hematuria   Qualified Code(s): N30.01 - Acute cystitis with hematuria   


Is this a current diagnosis for this admission?: Yes   


Plan: 


ESBL producing E. coli is growing out of the urine.  Currently on Zosyn.  Also 

on continuous bladder irrigation, which will continue until the intensity of the

redness has diminished.  Currently awaiting a call back from his urologist in 

[place] Dr. Morrison.








(3) Altered mental status


Qualifiers: 


   Altered mental status type: unspecified   Qualified Code(s): R41.82 - Altered

mental status, unspecified   


Is this a current diagnosis for this admission?: Yes   


Plan: 


Resolved








(4) Paroxysmal atrial fibrillation


Is this a current diagnosis for this admission?: Yes   


Plan: 


Currently in a sinus rhythm.  Had been on Coumadin according to his son, he does

not want him to continue taking that.  Currently on Lovenox for DVT prophylaxis,

will revisit anticoagulation on him when he is getting closer to discharge in 

light of his current bleeding.








(5) Hematuria


Qualifiers: 


   Hematuria type: gross   Qualified Code(s): R31.0 - Gross hematuria   


Is this a current diagnosis for this admission?: Yes   


Plan: 


Currently on continuous bladder irrigation.  He has urinary tract infection, but

he also has multiple kidney stones as well as a ureteral stent for an 

obstructing stone.  Am awaiting a return call from his urologist Dr. Morrison in an 

attempt to coordinate transition of care once the patient is ready to leave the 

hospital.








- Time


Time Spent with patient: 25-34 minutes

## 2019-03-02 LAB
ERYTHROCYTE [DISTWIDTH] IN BLOOD BY AUTOMATED COUNT: 20.4 % (ref 11.5–14)
HCT VFR BLD CALC: 32.1 % (ref 37.9–51)
HGB BLD-MCNC: 10.6 G/DL (ref 13.5–17)
MCH RBC QN AUTO: 31.2 PG (ref 27–33.4)
MCHC RBC AUTO-ENTMCNC: 33 G/DL (ref 32–36)
MCV RBC AUTO: 95 FL (ref 80–97)
PLATELET # BLD: 162 10^3/UL (ref 150–450)
RBC # BLD AUTO: 3.39 10^6/UL (ref 4.35–5.55)
WBC # BLD AUTO: 9.8 10^3/UL (ref 4–10.5)

## 2019-03-02 RX ADMIN — MORPHINE SULFATE PRN MG: 10 INJECTION INTRAMUSCULAR; INTRAVENOUS; SUBCUTANEOUS at 16:02

## 2019-03-02 RX ADMIN — MORPHINE SULFATE PRN MG: 10 INJECTION INTRAMUSCULAR; INTRAVENOUS; SUBCUTANEOUS at 22:19

## 2019-03-02 RX ADMIN — PIPERACILLIN AND TAZOBACTAM SCH MLS/HR: 3; .375 INJECTION, POWDER, LYOPHILIZED, FOR SOLUTION INTRAVENOUS; PARENTERAL at 14:14

## 2019-03-02 RX ADMIN — FAMOTIDINE SCH MG: 20 TABLET, FILM COATED ORAL at 09:57

## 2019-03-02 RX ADMIN — METHOTREXATE SCH MG: 2.5 TABLET ORAL at 21:24

## 2019-03-02 RX ADMIN — SIMVASTATIN SCH MG: 40 TABLET, FILM COATED ORAL at 21:24

## 2019-03-02 RX ADMIN — PIPERACILLIN AND TAZOBACTAM SCH MLS/HR: 3; .375 INJECTION, POWDER, LYOPHILIZED, FOR SOLUTION INTRAVENOUS; PARENTERAL at 08:23

## 2019-03-02 RX ADMIN — MORPHINE SULFATE PRN MG: 10 INJECTION INTRAMUSCULAR; INTRAVENOUS; SUBCUTANEOUS at 02:25

## 2019-03-02 RX ADMIN — PREDNISONE SCH MG: 10 TABLET ORAL at 09:57

## 2019-03-02 RX ADMIN — METHOTREXATE SCH MG: 2.5 TABLET ORAL at 09:58

## 2019-03-02 RX ADMIN — PIPERACILLIN AND TAZOBACTAM SCH MLS/HR: 3; .375 INJECTION, POWDER, LYOPHILIZED, FOR SOLUTION INTRAVENOUS; PARENTERAL at 02:25

## 2019-03-02 RX ADMIN — SOTALOL HYDROCHLORIDE SCH MG: 80 TABLET ORAL at 09:59

## 2019-03-02 RX ADMIN — FAMOTIDINE SCH MG: 20 TABLET, FILM COATED ORAL at 21:24

## 2019-03-02 RX ADMIN — ENOXAPARIN SODIUM SCH MG: 40 INJECTION SUBCUTANEOUS at 21:23

## 2019-03-02 RX ADMIN — BACLOFEN PRN MG: 10 TABLET ORAL at 05:39

## 2019-03-02 RX ADMIN — TAMSULOSIN HYDROCHLORIDE SCH MG: 0.4 CAPSULE ORAL at 21:23

## 2019-03-02 RX ADMIN — SOTALOL HYDROCHLORIDE SCH MG: 80 TABLET ORAL at 21:22

## 2019-03-02 RX ADMIN — SENNOSIDES, DOCUSATE SODIUM SCH EACH: 50; 8.6 TABLET, FILM COATED ORAL at 09:57

## 2019-03-02 RX ADMIN — FINASTERIDE SCH MG: 5 TABLET, FILM COATED ORAL at 09:57

## 2019-03-02 RX ADMIN — MAGNESIUM OXIDE TAB 400 MG (241.3 MG ELEMENTAL MG) SCH MG: 400 (241.3 MG) TAB at 17:29

## 2019-03-02 RX ADMIN — ASPIRIN SCH MG: 81 TABLET, CHEWABLE ORAL at 09:57

## 2019-03-02 RX ADMIN — MORPHINE SULFATE PRN MG: 10 INJECTION INTRAMUSCULAR; INTRAVENOUS; SUBCUTANEOUS at 12:05

## 2019-03-02 RX ADMIN — GUAIFENESIN SCH MG: 600 TABLET, EXTENDED RELEASE ORAL at 21:24

## 2019-03-02 RX ADMIN — MAGNESIUM OXIDE TAB 400 MG (241.3 MG ELEMENTAL MG) SCH MG: 400 (241.3 MG) TAB at 09:57

## 2019-03-02 RX ADMIN — POTASSIUM CHLORIDE SCH MEQ: 750 TABLET, FILM COATED, EXTENDED RELEASE ORAL at 09:57

## 2019-03-02 RX ADMIN — CLOPIDOGREL BISULFATE SCH MG: 75 TABLET, FILM COATED ORAL at 09:57

## 2019-03-02 RX ADMIN — PIPERACILLIN AND TAZOBACTAM SCH MLS/HR: 3; .375 INJECTION, POWDER, LYOPHILIZED, FOR SOLUTION INTRAVENOUS; PARENTERAL at 21:20

## 2019-03-02 NOTE — PDOC PROGRESS REPORT
Subjective


Progress Note for:: 03/02/19


Subjective:: 





Patient seen resting in bed.  He is awake, alert, oriented x3.  He denies chest 

pain, shortness of breath or dyspnea.  He has a loose congested cough.  He 

states he is occasionally bringing up some mucus.  He denies any nausea, 

vomiting or abdominal pain.  He continues to have bladder irrigation Salazar in 

place.  His urine is blood-tinged with some shreds of mucus.  This is improved 

from previous according to nursing staff.  He denies any dysuria.  He denies any

fever or chills overnight.  Denies any significant arthralgias or myalgias.  

There is presently no family at the bedside.


Reason For Visit: 


RENAL STONE








Physical Exam


Vital Signs: 


                                        











Temp Pulse Resp BP Pulse Ox


 


 98.1 F   64   16   146/86 H  96 


 


 03/02/19 08:04  03/02/19 08:04  03/02/19 08:04  03/02/19 08:04  03/02/19 08:04








                                 Intake & Output











 03/01/19 03/02/19 03/03/19





 06:59 06:59 06:59


 


Intake Total 1081 2094 100


 


Output Total 1250 2600 


 


Balance -169 -506 100


 


Weight 72 kg  











General appearance: PRESENT: no acute distress, well-developed, well-nourished


Head exam: PRESENT: atraumatic, normocephalic


Eye exam: PRESENT: conjunctiva pink, EOMI, PERRLA.  ABSENT: scleral icterus


Ear exam: PRESENT: normal external ear exam


Mouth exam: PRESENT: moist, tongue midline


Neck exam: ABSENT: carotid bruit, JVD, lymphadenopathy, thyromegaly


Respiratory exam: PRESENT: rhonchi, symmetrical, unlabored


Cardiovascular exam: PRESENT: RRR.  ABSENT: diastolic murmur, rubs, systolic 

murmur


Pulses: PRESENT: normal dorsalis pedis pul


Vascular exam: PRESENT: normal capillary refill


GI/Abdominal exam: PRESENT: normal bowel sounds, soft.  ABSENT: distended, 

guarding, mass, organolmegaly, rebound, tenderness


Rectal exam: PRESENT: deferred


Extremities exam: PRESENT: full ROM.  ABSENT: calf tenderness, clubbing, pedal 

edema


Neurological exam: PRESENT: alert, awake, oriented to person, oriented to place,

oriented to time, oriented to situation, CN II-XII grossly intact.  ABSENT: 

motor sensory deficit


Psychiatric exam: PRESENT: appropriate affect, normal mood.  ABSENT: homicidal 

ideation, suicidal ideation


Skin exam: PRESENT: dry, intact, warm.  ABSENT: cyanosis, rash





Results


Laboratory Results: 


                                        





                                 03/02/19 04:57 





                                 02/28/19 10:10 





                                        











  03/02/19 03/02/19





  04:57 06:10


 


WBC  9.8 


 


RBC  3.39 L 


 


Hgb  10.6 L 


 


Hct  32.1 L 


 


MCV  95 


 


MCH  31.2 


 


MCHC  33.0 


 


RDW  20.4 H 


 


Plt Count  162 


 


Stool Occult Blood   POSITIVE








                                        











  02/23/19 02/23/19 02/23/19





  12:00 18:19 18:19


 


Creatine Kinase   23 L 


 


Troponin I  0.021   0.021














  02/24/19 02/24/19 02/24/19





  00:33 00:33 08:13


 


Creatine Kinase  35 L   24 L


 


Troponin I   0.022 














  02/24/19





  08:13


 


Creatine Kinase 


 


Troponin I  < 0.012











Impressions: 


                                        





Abdomen/Pelvis CT  02/23/19 00:00


IMPRESSION:


1. Unchanged nonobstructing 7 mm stone within the mid left ureter.  New 4 mm 

stone within the left renal pelvis.  Unchanged 1.2 cm stone within the superior 

calyx of the left kidney.  No hydronephrosis.


2. Unchanged positioning of the left ureteral stent.


3. No additional changes when compared to 2/22/2019.


 








Chest X-Ray  02/23/19 09:50


IMPRESSION:  COPD.  NO ACUTE RADIOGRAPHIC FINDING IN THE CHEST.


 








Head CT  02/23/19 11:35


IMPRESSION:  CHRONIC CHANGES OF ATROPHY AND MICROVASCULAR ISCHEMIA.  NO ACUTE 

PROCESS.


EVIDENCE OF ACUTE STROKE: NO.


 














Assessment & Plan





- Diagnosis


(1) UTI (urinary tract infection)


Qualifiers: 


   Urinary tract infection type: acute cystitis   Hematuria presence: with 

hematuria   Qualified Code(s): N30.01 - Acute cystitis with hematuria   


Is this a current diagnosis for this admission?: Yes   


Plan: 


Culture grew ESBL.  He is on day 7 of IV Zosyn.  He is on continue bladder 

irrigation due to gross hematuria this is improving.  Dr. Morrison, neurologist in 

[place] is following








(2) Sepsis


Qualifiers: 


   Sepsis type: Escherichia coli   Qualified Code(s): A41.51 - Sepsis due to 

Escherichia coli [E. coli]   


Is this a current diagnosis for this admission?: Yes   


Plan: 


Resolved.  Secondary to ESBL UTI.  He is presently on day 7 of his IV Zosyn, and

bladder irrigation








(3) Hematuria


Qualifiers: 


   Hematuria type: gross   Qualified Code(s): R31.0 - Gross hematuria   


Is this a current diagnosis for this admission?: Yes   


Plan: 


This is improving.  Urine today is blood-tinged with strands of mucus.  We will 

continue irrigation until bleeding has resolved.  He was on warfarin for 

paroxysmal atrial fibrillation.  Family has requested this to be stopped.








(4) BPH (benign prostatic hyperplasia)


Is this a current diagnosis for this admission?: Yes   





(5) Leukocytosis


Qualifiers: 


   Leukocytosis type: unspecified   Qualified Code(s): D72.829 - Elevated white 

blood cell count, unspecified   


Is this a current diagnosis for this admission?: Yes   


Plan: 


Improved with IV antibiotics.








(6) COPD (chronic obstructive pulmonary disease)


Is this a current diagnosis for this admission?: Yes   


Plan: 


Continue inhalers.  We will add incentive spirometry and guaifenesin today.








(7) Rheumatoid arthritis


Is this a current diagnosis for this admission?: Yes   





(8) Altered mental status


Qualifiers: 


   Altered mental status type: unspecified   Qualified Code(s): R41.82 - Altered

mental status, unspecified   


Is this a current diagnosis for this admission?: Yes   


Plan: 


Resolved.








- Time


Time Spent with patient: 25-34 minutes


Total Critical Time (Minutes): 25


Medications reviewed and adjusted accordingly: Yes


Anticipated discharge: Acute Rehab


Within: within 72 hours





- Inpatient Certification


Based on my medical assessment, after consideration of the patient's 

comorbidities, presenting symptoms, or acuity I expect that the services needed 

warrant INPATIENT care.: Yes


Medical Necessity: Need for IV Antibiotics, Risk of Complication if Not Cared 

For in Hospital

## 2019-03-03 LAB
ANION GAP SERPL CALC-SCNC: 9 MMOL/L (ref 5–19)
BUN SERPL-MCNC: 9 MG/DL (ref 7–20)
CALCIUM: 8.8 MG/DL (ref 8.4–10.2)
CHLORIDE SERPL-SCNC: 110 MMOL/L (ref 98–107)
CO2 SERPL-SCNC: 24 MMOL/L (ref 22–30)
GLUCOSE SERPL-MCNC: 88 MG/DL (ref 75–110)
POTASSIUM SERPL-SCNC: 3.7 MMOL/L (ref 3.6–5)
SODIUM SERPL-SCNC: 142.5 MMOL/L (ref 137–145)

## 2019-03-03 RX ADMIN — PIPERACILLIN AND TAZOBACTAM SCH MLS/HR: 3; .375 INJECTION, POWDER, LYOPHILIZED, FOR SOLUTION INTRAVENOUS; PARENTERAL at 21:24

## 2019-03-03 RX ADMIN — FAMOTIDINE SCH MG: 20 TABLET, FILM COATED ORAL at 21:26

## 2019-03-03 RX ADMIN — ASPIRIN SCH MG: 81 TABLET, CHEWABLE ORAL at 11:06

## 2019-03-03 RX ADMIN — SIMVASTATIN SCH MG: 40 TABLET, FILM COATED ORAL at 21:26

## 2019-03-03 RX ADMIN — MORPHINE SULFATE PRN MG: 10 INJECTION INTRAMUSCULAR; INTRAVENOUS; SUBCUTANEOUS at 03:23

## 2019-03-03 RX ADMIN — TRAMADOL HYDROCHLORIDE PRN MG: 50 TABLET, FILM COATED ORAL at 17:27

## 2019-03-03 RX ADMIN — SOTALOL HYDROCHLORIDE SCH MG: 80 TABLET ORAL at 21:24

## 2019-03-03 RX ADMIN — MAGNESIUM OXIDE TAB 400 MG (241.3 MG ELEMENTAL MG) SCH MG: 400 (241.3 MG) TAB at 11:05

## 2019-03-03 RX ADMIN — GUAIFENESIN SCH MG: 600 TABLET, EXTENDED RELEASE ORAL at 21:25

## 2019-03-03 RX ADMIN — SENNOSIDES, DOCUSATE SODIUM SCH EACH: 50; 8.6 TABLET, FILM COATED ORAL at 11:06

## 2019-03-03 RX ADMIN — CLOPIDOGREL BISULFATE SCH MG: 75 TABLET, FILM COATED ORAL at 11:06

## 2019-03-03 RX ADMIN — GUAIFENESIN SCH MG: 600 TABLET, EXTENDED RELEASE ORAL at 11:06

## 2019-03-03 RX ADMIN — BACLOFEN PRN MG: 10 TABLET ORAL at 21:26

## 2019-03-03 RX ADMIN — MORPHINE SULFATE PRN MG: 10 INJECTION INTRAMUSCULAR; INTRAVENOUS; SUBCUTANEOUS at 08:50

## 2019-03-03 RX ADMIN — FAMOTIDINE SCH MG: 20 TABLET, FILM COATED ORAL at 11:05

## 2019-03-03 RX ADMIN — PIPERACILLIN AND TAZOBACTAM SCH MLS/HR: 3; .375 INJECTION, POWDER, LYOPHILIZED, FOR SOLUTION INTRAVENOUS; PARENTERAL at 08:53

## 2019-03-03 RX ADMIN — ENOXAPARIN SODIUM SCH MG: 40 INJECTION SUBCUTANEOUS at 21:25

## 2019-03-03 RX ADMIN — SOTALOL HYDROCHLORIDE SCH MG: 80 TABLET ORAL at 11:05

## 2019-03-03 RX ADMIN — ACETAMINOPHEN PRN MG: 325 TABLET ORAL at 21:26

## 2019-03-03 RX ADMIN — MORPHINE SULFATE PRN MG: 10 INJECTION INTRAMUSCULAR; INTRAVENOUS; SUBCUTANEOUS at 12:55

## 2019-03-03 RX ADMIN — MAGNESIUM OXIDE TAB 400 MG (241.3 MG ELEMENTAL MG) SCH MG: 400 (241.3 MG) TAB at 17:28

## 2019-03-03 RX ADMIN — PREDNISONE SCH MG: 10 TABLET ORAL at 11:06

## 2019-03-03 RX ADMIN — PIPERACILLIN AND TAZOBACTAM SCH MLS/HR: 3; .375 INJECTION, POWDER, LYOPHILIZED, FOR SOLUTION INTRAVENOUS; PARENTERAL at 03:24

## 2019-03-03 RX ADMIN — FINASTERIDE SCH MG: 5 TABLET, FILM COATED ORAL at 11:06

## 2019-03-03 RX ADMIN — POTASSIUM CHLORIDE SCH MEQ: 750 TABLET, FILM COATED, EXTENDED RELEASE ORAL at 11:05

## 2019-03-03 RX ADMIN — PIPERACILLIN AND TAZOBACTAM SCH MLS/HR: 3; .375 INJECTION, POWDER, LYOPHILIZED, FOR SOLUTION INTRAVENOUS; PARENTERAL at 15:41

## 2019-03-03 RX ADMIN — TAMSULOSIN HYDROCHLORIDE SCH MG: 0.4 CAPSULE ORAL at 21:25

## 2019-03-03 NOTE — PDOC PROGRESS REPORT
Subjective


Progress Note for:: 03/03/19


Subjective:: 





Patient seen resting in bed.  He is awake, alert, oriented x2.  Confused to time

and date.  He denies chest pain, shortness of breath or dyspnea.  He has a loose

congested cough.  He states he is occasionally bringing up some mucus.  He 

denies any nausea, vomiting or abdominal pain.  He continues to have bladder 

irrigation Salazar in place.  His urine is much less blood-tinged this morning. He

denies any dysuria.  He denies any fever or chills overnight.  Denies any 

significant arthralgias or myalgias.  There is presently no family at the 

bedside.


Reason For Visit: 


RENAL STONE








Physical Exam


Vital Signs: 


                                        











Temp Pulse Resp BP Pulse Ox


 


 97.5 F   72   21 H  112/70   93 


 


 03/03/19 08:17  03/03/19 08:17  03/03/19 08:17  03/03/19 08:17  03/03/19 08:17








                                 Intake & Output











 03/02/19 03/03/19 03/04/19





 06:59 06:59 06:59


 


Intake Total 2094 1700 


 


Output Total 2600 31664 


 


Balance -506 -8400 


 


Weight  65.8 kg 











General appearance: PRESENT: no acute distress, hard of hearing, thin, well-

developed


Head exam: PRESENT: atraumatic, normocephalic


Eye exam: PRESENT: conjunctiva pink, EOMI, PERRLA.  ABSENT: scleral icterus


Ear exam: PRESENT: normal external ear exam


Mouth exam: PRESENT: moist, tongue midline


Neck exam: ABSENT: carotid bruit, JVD, lymphadenopathy, thyromegaly


Respiratory exam: PRESENT: clear to auscultation hellen, symmetrical, unlabored.  

ABSENT: rales, rhonchi, wheezes


Cardiovascular exam: PRESENT: RRR.  ABSENT: diastolic murmur, rubs, systolic 

murmur


Pulses: PRESENT: normal dorsalis pedis pul


Vascular exam: PRESENT: normal capillary refill


GI/Abdominal exam: PRESENT: normal bowel sounds, soft.  ABSENT: distended, 

guarding, mass, organolmegaly, rebound, tenderness


Rectal exam: PRESENT: deferred


Extremities exam: PRESENT: full ROM.  ABSENT: calf tenderness, clubbing, pedal 

edema


Neurological exam: PRESENT: alert, awake, oriented to person, oriented to place,

oriented to situation, CN II-XII grossly intact


Psychiatric exam: PRESENT: appropriate affect, normal mood.  ABSENT: homicidal 

ideation, suicidal ideation


Skin exam: PRESENT: dry, intact, warm.  ABSENT: cyanosis, rash





Results


Laboratory Results: 


                                        





                                 03/02/19 04:57 





                                 03/03/19 04:39 





                                        











  03/03/19





  04:39


 


Sodium  142.5


 


Potassium  3.7


 


Chloride  110 H


 


Carbon Dioxide  24


 


Anion Gap  9


 


BUN  9


 


Creatinine  0.78


 


Est GFR ( Amer)  > 60


 


Est GFR (Non-Af Amer)  > 60


 


Glucose  88


 


Calcium  8.8








                                        











  02/23/19 02/23/19 02/23/19





  12:00 18:19 18:19


 


Creatine Kinase   23 L 


 


Troponin I  0.021   0.021














  02/24/19 02/24/19 02/24/19





  00:33 00:33 08:13


 


Creatine Kinase  35 L   24 L


 


Troponin I   0.022 














  02/24/19





  08:13


 


Creatine Kinase 


 


Troponin I  < 0.012











Impressions: 


                                        





Abdomen/Pelvis CT  02/23/19 00:00


IMPRESSION:


1. Unchanged nonobstructing 7 mm stone within the mid left ureter.  New 4 mm 

stone within the left renal pelvis.  Unchanged 1.2 cm stone within the superior 

calyx of the left kidney.  No hydronephrosis.


2. Unchanged positioning of the left ureteral stent.


3. No additional changes when compared to 2/22/2019.


 








Chest X-Ray  02/23/19 09:50


IMPRESSION:  COPD.  NO ACUTE RADIOGRAPHIC FINDING IN THE CHEST.


 








Head CT  02/23/19 11:35


IMPRESSION:  CHRONIC CHANGES OF ATROPHY AND MICROVASCULAR ISCHEMIA.  NO ACUTE 

PROCESS.


EVIDENCE OF ACUTE STROKE: NO.


 














Assessment & Plan





- Diagnosis


(1) UTI (urinary tract infection)


Qualifiers: 


   Urinary tract infection type: acute cystitis   Hematuria presence: with 

hematuria   Qualified Code(s): N30.01 - Acute cystitis with hematuria   


Is this a current diagnosis for this admission?: Yes   


Plan: 


Culture grew ESBL.  He is on day 8 of IV Zosyn.  He is on continue bladder 

irrigation due to gross hematuria this is improving.  Dr. Morrison, neurologist in 

[place] is following. He is on methotrexate for rheumatoid arthritis.  He also 

had a ureteral stent placed and [place] in mid January.  He was hospitalized 

after that for another urinary tract infection.








(2) Sepsis


Qualifiers: 


   Sepsis type: Escherichia coli   Qualified Code(s): A41.51 - Sepsis due to 

Escherichia coli [E. coli]   


Is this a current diagnosis for this admission?: Yes   


Plan: 


Resolved.  Secondary to ESBL UTI.  He is presently on day 7 of his IV Zosyn, and

bladder irrigation








(3) Hematuria


Qualifiers: 


   Hematuria type: gross   Qualified Code(s): R31.0 - Gross hematuria   


Is this a current diagnosis for this admission?: Yes   


Plan: 


This is improving.  Urine today is slightly blood-tinged today but much 

improved.  Think we could probably stop bladder irrigation tomorrow.  He was on 

warfarin for paroxysmal atrial fibrillation.  Family has requested this to be 

stopped.  He is continued on aspirin and Plavix.








(4) BPH (benign prostatic hyperplasia)


Is this a current diagnosis for this admission?: Yes   


Plan: 


Continue Flomax








(5) Leukocytosis


Qualifiers: 


   Leukocytosis type: unspecified   Qualified Code(s): D72.829 - Elevated white 

blood cell count, unspecified   


Is this a current diagnosis for this admission?: Yes   


Plan: 


Resolved with IV antibiotics.








(6) COPD (chronic obstructive pulmonary disease)


Is this a current diagnosis for this admission?: Yes   


Plan: 


Continue inhalers.  We will add incentive spirometry and guaifenesin today.








(7) Rheumatoid arthritis


Is this a current diagnosis for this admission?: Yes   


Plan: 


Continue methotrexate.








(8) Altered mental status


Qualifiers: 


   Altered mental status type: unspecified   Qualified Code(s): R41.82 - Altered

mental status, unspecified   


Is this a current diagnosis for this admission?: Yes   


Plan: 


The majority of the time he is now oriented.  He has periods of confusion 

however.








- Time


Time Spent with patient: 35 or more minutes


Total Critical Time (Minutes): 25


Anticipated discharge: Acute Rehab


Within: within 48 hours





- Inpatient Certification


Based on my medical assessment, after consideration of the patient's 

comorbidities, presenting symptoms, or acuity I expect that the services needed 

warrant INPATIENT care.: Yes


I certify that my determination is in accordance with my understanding of 

Medicare's requirements for reasonable and necessary INPATIENT services [42 CFR 

412.3e].: Yes


Medical Necessity: Significant Comorbidiites Make Outpatient Treatment Too 

Risky, Need for IV Antibiotics

## 2019-03-03 NOTE — PROGRESS NOTE
Provider Note


Provider Note: 


Called by RN because of a drop in oxygen saturation to 85% room air.  Patient 

was examined he has upper airway congestion with loose congested cough.  However

his lungs are essentially clear.  Chest x-ray was obtained stat which confirmed 

that there is no congestion in his chest.  He appears to be slightly overmedic

ated with pain medicine.  Have asked nursing to switch to tramadol for pain and 

stop IV morphine.  Will have respiratory work with him for using incentive 

spirometry.  Mucinex 600 mg twice daily was initiated.  Continue DuoNeb therapy 

as needed.  Saturating at 94% on 3 L at present time.

## 2019-03-03 NOTE — RADIOLOGY REPORT (SQ)
EXAM DESCRIPTION:  CHEST SINGLE VIEW



COMPLETED DATE/TIME:  3/3/2019 3:46 pm



REASON FOR STUDY:  Cough and hypoxemia



COMPARISON:  2/23/2019



EXAM PARAMETERS:  NUMBER OF VIEWS: One view.

TECHNIQUE: Single frontal radiographic view of the chest acquired.

RADIATION DOSE: NA

LIMITATIONS: None.



FINDINGS:  LUNGS AND PLEURA: No opacities, masses or pneumothorax. No pleural effusion.  Right apical
 scarring.

MEDIASTINUM AND HILAR STRUCTURES: No masses.  Contour normal.

HEART AND VASCULAR STRUCTURES: Heart normal in size.  Normal vasculature.  ectatic aorta.

BONES: No acute findings.

HARDWARE: None in the chest.

OTHER: No other significant finding.



IMPRESSION:  NO ACUTE RADIOGRAPHIC FINDING IN THE CHEST.



TECHNICAL DOCUMENTATION:  JOB ID:  [id number]

 2011 [employer]- All Rights Reserved



Reading location - IP/workstation name: [email or web address]

## 2019-03-04 LAB
ANION GAP SERPL CALC-SCNC: 10 MMOL/L (ref 5–19)
BUN SERPL-MCNC: 17 MG/DL (ref 7–20)
CALCIUM: 8.9 MG/DL (ref 8.4–10.2)
CHLORIDE SERPL-SCNC: 111 MMOL/L (ref 98–107)
CO2 SERPL-SCNC: 24 MMOL/L (ref 22–30)
ERYTHROCYTE [DISTWIDTH] IN BLOOD BY AUTOMATED COUNT: 20.7 % (ref 11.5–14)
GLUCOSE SERPL-MCNC: 98 MG/DL (ref 75–110)
HCT VFR BLD CALC: 33.3 % (ref 37.9–51)
HGB BLD-MCNC: 10.8 G/DL (ref 13.5–17)
MCH RBC QN AUTO: 31.2 PG (ref 27–33.4)
MCHC RBC AUTO-ENTMCNC: 32.4 G/DL (ref 32–36)
MCV RBC AUTO: 96 FL (ref 80–97)
PLATELET # BLD: 179 10^3/UL (ref 150–450)
POTASSIUM SERPL-SCNC: 3.7 MMOL/L (ref 3.6–5)
RBC # BLD AUTO: 3.46 10^6/UL (ref 4.35–5.55)
SODIUM SERPL-SCNC: 145.3 MMOL/L (ref 137–145)
WBC # BLD AUTO: 15.7 10^3/UL (ref 4–10.5)

## 2019-03-04 RX ADMIN — SIMVASTATIN SCH MG: 40 TABLET, FILM COATED ORAL at 22:01

## 2019-03-04 RX ADMIN — ACETAMINOPHEN PRN MG: 325 TABLET ORAL at 12:30

## 2019-03-04 RX ADMIN — ACETAMINOPHEN PRN MG: 325 TABLET ORAL at 01:31

## 2019-03-04 RX ADMIN — PIPERACILLIN AND TAZOBACTAM SCH MLS/HR: 3; .375 INJECTION, POWDER, LYOPHILIZED, FOR SOLUTION INTRAVENOUS; PARENTERAL at 16:06

## 2019-03-04 RX ADMIN — MORPHINE SULFATE PRN MG: 10 INJECTION INTRAMUSCULAR; INTRAVENOUS; SUBCUTANEOUS at 08:14

## 2019-03-04 RX ADMIN — PIPERACILLIN AND TAZOBACTAM SCH MLS/HR: 3; .375 INJECTION, POWDER, LYOPHILIZED, FOR SOLUTION INTRAVENOUS; PARENTERAL at 22:00

## 2019-03-04 RX ADMIN — SIMETHICONE PRN MG: 80 TABLET, CHEWABLE ORAL at 22:00

## 2019-03-04 RX ADMIN — SOTALOL HYDROCHLORIDE SCH MG: 80 TABLET ORAL at 22:02

## 2019-03-04 RX ADMIN — GUAIFENESIN SCH MG: 600 TABLET, EXTENDED RELEASE ORAL at 22:01

## 2019-03-04 RX ADMIN — ENOXAPARIN SODIUM SCH: 40 INJECTION SUBCUTANEOUS at 22:02

## 2019-03-04 RX ADMIN — POTASSIUM CHLORIDE SCH: 750 TABLET, FILM COATED, EXTENDED RELEASE ORAL at 10:04

## 2019-03-04 RX ADMIN — MORPHINE SULFATE PRN MG: 10 INJECTION INTRAMUSCULAR; INTRAVENOUS; SUBCUTANEOUS at 19:03

## 2019-03-04 RX ADMIN — BACLOFEN PRN MG: 10 TABLET ORAL at 22:01

## 2019-03-04 RX ADMIN — FAMOTIDINE SCH: 20 TABLET, FILM COATED ORAL at 10:05

## 2019-03-04 RX ADMIN — CLOPIDOGREL BISULFATE SCH: 75 TABLET, FILM COATED ORAL at 10:05

## 2019-03-04 RX ADMIN — SENNOSIDES, DOCUSATE SODIUM SCH: 50; 8.6 TABLET, FILM COATED ORAL at 10:05

## 2019-03-04 RX ADMIN — SOTALOL HYDROCHLORIDE SCH: 80 TABLET ORAL at 10:04

## 2019-03-04 RX ADMIN — ASPIRIN SCH: 81 TABLET, CHEWABLE ORAL at 10:04

## 2019-03-04 RX ADMIN — ACETAMINOPHEN PRN MG: 325 TABLET ORAL at 22:01

## 2019-03-04 RX ADMIN — TAMSULOSIN HYDROCHLORIDE SCH MG: 0.4 CAPSULE ORAL at 22:01

## 2019-03-04 RX ADMIN — FAMOTIDINE SCH MG: 20 TABLET, FILM COATED ORAL at 22:01

## 2019-03-04 RX ADMIN — MAGNESIUM OXIDE TAB 400 MG (241.3 MG ELEMENTAL MG) SCH MG: 400 (241.3 MG) TAB at 18:55

## 2019-03-04 RX ADMIN — PREDNISONE SCH: 10 TABLET ORAL at 10:04

## 2019-03-04 RX ADMIN — PIPERACILLIN AND TAZOBACTAM SCH MLS/HR: 3; .375 INJECTION, POWDER, LYOPHILIZED, FOR SOLUTION INTRAVENOUS; PARENTERAL at 08:12

## 2019-03-04 RX ADMIN — GUAIFENESIN SCH: 600 TABLET, EXTENDED RELEASE ORAL at 10:05

## 2019-03-04 RX ADMIN — FINASTERIDE SCH: 5 TABLET, FILM COATED ORAL at 10:05

## 2019-03-04 RX ADMIN — PIPERACILLIN AND TAZOBACTAM SCH MLS/HR: 3; .375 INJECTION, POWDER, LYOPHILIZED, FOR SOLUTION INTRAVENOUS; PARENTERAL at 04:00

## 2019-03-04 RX ADMIN — TRAMADOL HYDROCHLORIDE PRN MG: 50 TABLET, FILM COATED ORAL at 01:31

## 2019-03-04 RX ADMIN — MAGNESIUM OXIDE TAB 400 MG (241.3 MG ELEMENTAL MG) SCH: 400 (241.3 MG) TAB at 10:04

## 2019-03-04 RX ADMIN — BACLOFEN PRN MG: 10 TABLET ORAL at 12:30

## 2019-03-04 NOTE — PROGRESS NOTE
Provider Note


Provider Note: 


ID Consult Note





Asked to review patient's chart. Pt not seen or examined. Mr. Bowden is a 73 year

old man with PMH including COPD, CAD, b/l lower leg bypasses for PVD, AAA s/p 

repair, RA, AF on warfarin, kidney stones s/p recent ureteral stent. Pt was 

admitted with altered mental status, had L CVA pain/tenderness to palpation with

bloody urine in Salazar, leukocytosis, elevated lactate, and hypotension on 

presentation - suspected to be septic due to a urinary source. Empirically 

vancomycin and Zosyn were started. Blood cultures were negative. Urine culture 

grew 6,000 colonies of an ESBL E coli. He had clinical improvement - was able to

communicate, became more alert and oriented within 2-3 days. Leukocytosis also 

improved. CT abdomen/pelvis showed nonobstructing stone in mid L ureter, 

unchanged, and new 4 mm stone in the L renal pelvis, no hydronephrosis, 1.2 cm 

stone in superior calyx of L kidney. Pt was on Zosyn for 9 days from 2/24 to 

3/4. On 3/3, pt had cough and hypoxemia but clear lungs and CXR showing no new 

lung opacities; felt most likely to have been overmedicated with narcotics, and 

medication was adjusted accordingly. WBC count increased from 10 to 16,000 

today.  He has been afebrile. Clinically, he did not appear to have any other 

change. 





Impression/Recommendations


In retrospect, it might be possible that pt had renal colic with dehydration and

delirium from pain more so than a UTI per se. There was a surprisingly small 

amount of growth in the urine and small number of WBCs/hpf on the urinalysis.


Nonetheless, with the overlap in clinical syndromes and severity of his initial 

presentation, he was for a complicated urinary tract infection with Zosyn for an

ESBL E coli that was found in the urine. 


Generally, for complicated UTI 7 days of treatment is sufficient, and he appears

to have improved within 2-3 days of admission and has been fairly stable since 

then.  


He completed 9 days of Zosyn, which was discontinued today. 


There does not seem to be a convincing indication to continue Zosyn for longer 

than has been given. 


Recent bump in WBC count might reflect the episode of hypoxemia the evening 

prior and, I agree, monitoring pt clinically would be prudent.





Alvarez Vargas MD


[facility]


pager [phone]

## 2019-03-04 NOTE — PDOC PROGRESS REPORT
Subjective


Progress Note for:: 03/04/19


Subjective:: 





73-year-old male admitted with sepsis and altered mental status.  Unable to 

swallow anything.  Salazar's catheter shows blood stained urine.  Lactic acid is 

2.2 last night.  Plan is to increase the IV fluids to 150 cc/h do the bladder 

irrigation and repeated lactic acid levels today.  Today's labs are pending.  

Patient states he is in the hospital other than that unable to continue 

communication.  Keep on saying he wants to urinate ,explained to him that he has

a catheter in place.  Overall prognosis poor condition is critical.  Tried to 

call the son to give an update and unable to leave the message.





2/25/2019-patient is more alert more awake today.  Able to give his birthday, he

did know where he is at.  Therapy is working with the patient today.  Patient s

hows significant improvement.  We are going to try to give p.o. medications this

morning.  No acute events in last 24 hours.  Patient is afebrile.





2/26/2019-patient is communicating well no acute events in the last 24 hours.  

Patient is afebrile.  Patient said he had do not have any problems swallowing 

liquids but has a problem swallowing the solid food.  Yesterday he is able to 

take the pills without any problem.  Speech is aware they could reevaluate him 

tomorrow.  No other complaints.





3/4/2019 patient is comfortably in the bed.  No acute events in the last 24 

hours.  Patient is afebrile.  T-max is 97.7.  WBC went up to 15,700 today.  Plan

to recheck CBC tomorrow.


Reason For Visit: 


RENAL STONE








Physical Exam


Vital Signs: 


                                        











Temp Pulse Resp BP Pulse Ox


 


 97.2 F   65   19   125/61   98 


 


 03/04/19 07:46  03/04/19 07:46  03/04/19 07:46  03/04/19 07:46  03/04/19 09:44








                                 Intake & Output











 03/03/19 03/04/19 03/05/19





 06:59 06:59 06:59


 


Intake Total 1700 400 100


 


Output Total 33126 4275 


 


Balance -0260 -4635 100


 


Weight 65.8 kg 66.9 kg 











General appearance: PRESENT: no acute distress


Head exam: PRESENT: atraumatic


Eye exam: PRESENT: PERRLA


Mouth exam: PRESENT: moist, tongue midline


Neck exam: ABSENT: carotid bruit, JVD, lymphadenopathy, thyromegaly


Respiratory exam: PRESENT: clear to auscultation hellen.  ABSENT: rales, rhonchi, 

wheezes


Cardiovascular exam: PRESENT: RRR.  ABSENT: diastolic murmur, rubs, systolic 

murmur


GI/Abdominal exam: PRESENT: normal bowel sounds, soft.  ABSENT: distended, 

guarding, mass, organolmegaly, rebound, tenderness


Gentrourinary exam: PRESENT: indwelling catheter


Neurological exam: PRESENT: alert, awake, oriented to person, oriented to place,

oriented to time, oriented to situation, CN II-XII grossly intact.  ABSENT: 

motor sensory deficit


Psychiatric exam: PRESENT: appropriate affect, normal mood.  ABSENT: homicidal 

ideation, suicidal ideation





Results


Laboratory Results: 


                                        





                                 03/04/19 04:41 





                                 03/04/19 04:41 





                                        











  03/04/19 03/04/19





  04:41 04:41


 


WBC  15.7 H 


 


RBC  3.46 L 


 


Hgb  10.8 L 


 


Hct  33.3 L 


 


MCV  96 


 


MCH  31.2 


 


MCHC  32.4 


 


RDW  20.7 H 


 


Plt Count  179 


 


Sodium   145.3 H


 


Potassium   3.7


 


Chloride   111 H


 


Carbon Dioxide   24


 


Anion Gap   10


 


BUN   17


 


Creatinine   0.97


 


Est GFR ( Amer)   > 60


 


Est GFR (Non-Af Amer)   > 60


 


Glucose   98


 


Calcium   8.9


 


Magnesium   2.2








                                        











  02/23/19 02/23/19 02/23/19





  12:00 18:19 18:19


 


Creatine Kinase   23 L 


 


Troponin I  0.021   0.021














  02/24/19 02/24/19 02/24/19





  00:33 00:33 08:13


 


Creatine Kinase  35 L   24 L


 


Troponin I   0.022 














  02/24/19





  08:13


 


Creatine Kinase 


 


Troponin I  < 0.012











Impressions: 


                                        





Abdomen/Pelvis CT  02/23/19 00:00


IMPRESSION:


1. Unchanged nonobstructing 7 mm stone within the mid left ureter.  New 4 mm 

stone within the left renal pelvis.  Unchanged 1.2 cm stone within the superior 

calyx of the left kidney.  No hydronephrosis.


2. Unchanged positioning of the left ureteral stent.


3. No additional changes when compared to 2/22/2019.


 








Head CT  02/23/19 11:35


IMPRESSION:  CHRONIC CHANGES OF ATROPHY AND MICROVASCULAR ISCHEMIA.  NO ACUTE 

PROCESS.


EVIDENCE OF ACUTE STROKE: NO.


 








Chest X-Ray  03/03/19 15:15


IMPRESSION:  NO ACUTE RADIOGRAPHIC FINDING IN THE CHEST.


 














Assessment & Plan





- Diagnosis


(1) Altered mental status


Qualifiers: 


   Altered mental status type: unspecified   Qualified Code(s): R41.82 - Altered

mental status, unspecified   


Is this a current diagnosis for this admission?: Yes   


Plan: 


2/23/2019 patient is going to be admitted for altered mental status most likely 

secondary to sepsis.  Is going to be placed in IMCU.  Started on IV fluids 

normal saline at 100 cc/h, to repeat the lactic acid levels, started on IV Zosyn

and IV vancomycin.  We are going to adjust the doses as per pharmacy 

recommendations.  Blood cultures urine culture was sent.  Aspiration fall 

precautions are requested.  Neurochecks are requested for every 4 hours for the 

next 24 hours.  Swallowing evaluation will be done today.  CT head was negative 

for acute changes.  To start on IV Ativan 1 mg every 8 hours as needed for 

agitation.





2/24/2019-altered mental status/acute encephalopathy most likely secondary to 

sepsis.  Presently he is on IV Zosyn and vancomycin.  Urine cultures blood 

cultures are pending.  CT head was negative.  He failed swallowing evaluation.  

We are going to request for PT OT consult and discharge planner.  Requested for 

neuro checks.  Aspiration fall  Seizure precautions are requested.  Overall 

prognosis poor condition is critical.





2/25/2019 altered mental status/acute encephalopathy most likely secondary to 

sepsis.  Blood cultures are negative so far.  Patient is presently on IV Zosyn 

and vancomycin.  It is much more alert and oriented.  Be going to try to give 

his morning p.o. medications.  Therapy is working with the patient.  Aspiration 

fall seizure precautions are requested.





2/26/2019-altered mental status/acute encephalopathy most likely secondary to 

sepsis is resolved.  Patient said he has a difficulty in swallowing this morning

we going to reevaluate by placing a speech consult for tomorrow.  Started on 

pured diet from today.





3/4/2019-altered mental status/acute encephalopathy most likely secondary to 

sepsis which was resolved.  He is back at the baseline.








(2) Sepsis


Qualifiers: 


   Sepsis type: Escherichia coli   Qualified Code(s): A41.51 - Sepsis due to 

Escherichia coli [E. coli]   


Is this a current diagnosis for this admission?: Yes   


Plan: 


2/23/2019 patient is in septic stock when he came in blood pressure was 78/50 

with IV fluids latest blood pressure is 124/60 initially on presentation is 

tachycardic heart rate is 106 tachypneic respiratory of 24, lactic acid level is

3.1 with altered mental status.  And is to continue IV fluids normal saline at 

100 cc/h started on IV vancomycin and Zosyn, repeat the lactic acid level this 

evening daily labs.  Blood cultures urine cultures are pending.  Chest x-ray 

does not show any pneumonia.  Urinalysis is cloudy with leukocyte esterase 

positive, WBC more than 5 RBCs 182.  Most likely cause of sepsis is urinary 

tract infection.





2/24/2019-patient's respiratory rate is 31, blood pressure is 124/79 improved.  

Latest lactic acid is 0.2.  Urine cultures blood cultures are pending.  Patient 

is in altered mental status.  In my opinion patient is in septic shock.  Patient

is full code.  Plan is to continue IV fluids, IV Zosyn and IV vancomycin.





2/25/2019-patient's T-max is 98.4 pulse rate is 93.  Blood pressure today is 

138/71.  Plan is to continue the IV antibiotic therapy and to decrease the IV 

infusion of the fluids.  Cultures are pending.  Presently on IV Zosyn and 

vancomycin plan is to continue those antibiotics.





2/26/2019 patient was admitted with septic shock blood cultures are negative 

urine culture shows ESBL coli.  Blood pressure today 130/63 temperature 97.3 

pulse rate 62, WBC count is 8300.  Septic shock is resolved.





3/4/2019 patient is admitted with altered mental status blood cultures are 

negative urine culture is positive for ESBL E. coli.  Presently on IV Zosyn.








(3) Leukocytosis


Qualifiers: 


   Leukocytosis type: unspecified   Qualified Code(s): D72.829 - Elevated white 

blood cell count, unspecified   


Is this a current diagnosis for this admission?: Yes   


Plan: 


2/23/2019 admission WBC count is 10,000 most likely secondary to sepsis.





2/24/2019-yesterday's WBC count is 18,000 today's labs are pending leukocytosis 

most likely secondary to sepsis.





2/25/2019-today's labs are pending.  Admission WBC is 18,000 yesterday WBC came 

down to 14,500.  Plan is to continue the present management.  Leukocytosis is 

resolving.





2/26/2019-on admission WBC count is 18,100 today WBC count is 8300 leukocytosis 

secondary to sepsis is resolved.





3/4/2019-WBC count today 15,700.  Patient is on prednisone 10 mg .  Plan is to 

discontinue IV antibiotics today because patient received already 8 days of 

antibiotic therapy.


pt is afebrile for the last 1 week.








(4) COPD (chronic obstructive pulmonary disease)


Is this a current diagnosis for this admission?: Yes   


Plan: 


2/23/2019 patient has history of COPD not on home oxygen he is ex-smoker quit 

smoking 15 years ago.  He uses Brio and albuterol inhaler at home started him on

Xopenex nebulizations every 6 hours as needed.





2/24/2019-patient has history of COPD he is on nebulizer treatments.  He was 

pulse ox are in the 90s.





2/25/2019 patient has history of COPD his pulse ox is 92% on room air. he Is 

getting Xopenex nebulizer treatments.  Plan is to continue the present 

management.





2/26/2019-patient has history of COPD not on home oxygen.  Pulse ox is 82% on 

room air this morning.  He is on Xopenex nebulizations.  Chest x-ray negative 

for pneumonia.  Plan is to continue the present management.





3/4/2019-patient has history of COPD yesterday's pulse ox is noted to drop to 

85% chest x-ray was done which was negative.  Today patient is on 4 L of oxygen 

pulse ox is 98% I decreased the oxygen requirements to 2 L to see and check the 

oxygen saturation.








(5) Coronary artery disease


Is this a current diagnosis for this admission?: Yes   


Plan: 


2/23/2019-patient has history of coronary artery disease status post stent 

placement multiple times.  Plan to do the serial cardiac enzymes and troponins. 

Initial troponin is less than 0.021.  Patient is on aspirin Plavix and simv

astatin at home those medications are going to be resumed.





2/24/2019-patient has history of coronary artery disease previous history of 

multiple stents placement.  At home he is on aspirin, Plavix, simvastatin.  We 

resumed his home medications.  But patient is unable to swallow failed 

swallowing evaluation because of  altered mental status.





2/25/2019-patient has history of coronary artery disease with multiple stent 

placement.  At home he is on aspirin, Plavix, simvastatin plan is to continue 

with his home medications.  Patient failed his swallowing evaluation yesterday 

is much more alert and oriented today hopefully he able to take his oral 

medications.





2/26/2019-patient has history of coronary artery disease status post stent 

placement multiple times.  Patient is on aspirin, Plavix, and simvastatin.  

Resumed those   medications during the hospital stay.





3/4/2019-patient has history of coronary artery disease status post stent 

placement presently is on aspirin and Plavix and simvastatin plan is to continue

the present management.








(6) Rheumatoid arthritis


Is this a current diagnosis for this admission?: Yes   


Plan: 


2/23/2019 patient has history of rheumatoid arthritis on methotrexate and 

prednisone at home those medications are restarted again.





2/24/2019-patient has history of rheumatoid arthritis on methotrexate and p.o. 

prednisone at home.  Those medications are resumed but the patient is unable to 

swallow the pills at this moment.





2/25/2019-patient has history of rheumatoid arthritis on methotrexate and 

prednisone at home, meds will be restarted once able to swallow the medications.





2/26/2019-patient has history of rheumatoid arthritis on methotrexate and 

prednisone these medications are restarted during the hospital stay.





3/4/2019-patient has history of rheumatoid arthritis on methotrexate and 

prednisone at home these medications are continued during the hospital stay.








(7) Paroxysmal atrial fibrillation


Is this a current diagnosis for this admission?: Yes   


Plan: 


2/23/2019-patient has history of paroxysmal atrial fibrillation on warfarin 1 mg

p.o. daily, Plavix 75 mg p.o. daily, aspirin 81 mg p.o. daily these medications 

he is taking at home plan is to resume those medications in the hospital.





2/24/2019-patient is giving the history of his dad having paroxysmal atrial 

fibrillation, patient in sinus rhythm here.  We resumed his warfarin yesterday 

but is unable to take any oral medications at this point.





2/25/2019-patient is has a history of paroxysmal atrial fibrillation he is in 

sinus rhythm he is on warfarin at home.  We are going to check his PT/INR on 

daily basis.





2/26/2018-patient has history of paroxysmal atrial fibrillation, during his 

hospital stay he is on sinus rhythm.  According to his son warfarin was 

discontinued recently.  INR today is 1.71.  As per the son's request we going to

discontinue warfarin.  Be placed on a Lovenox 40 mg subcu daily.





3/4/2019-patient has history of paroxysmal atrial fibrillation presently is in 

sinus rhythm as per the son patient was on warfarin before and it was 

discontinued.  Getting Lovenox for DVT prophylaxis.  The catheter bag looks 

clean urine.  Looks like hematuria is resolving.








(8) History of AAA (abdominal aortic aneurysm) repair


Is this a current diagnosis for this admission?: Yes   


Plan: 


2/23/2019 patient has history of abdominal aortic aneurysm status post graft 

placement in 2010 as per the patient's son.





2/24/2019 patient has history of aortic aneurysm status post graft placement in 

2010.  CT scan of the abdomen does not show any abnormalities.








2/26/2019-patient has history of abdominal aortic aneurysm status post graft 

placement in 2010.  CT scan during this time patient does not show any acute 

abnormalities.





3/4/2019-patient has history of abdominal aortic aneurysm status post graft 

placement 2010 no acute events and during the hospital stay.  Plan is to 

continue the present management.








(9) BPH (benign prostatic hyperplasia)


Is this a current diagnosis for this admission?: Yes   


Plan: 


2/23/2019 patient has history of BPH on finasteride and Flomax at home those 

medications are restarted during this hospital stay.





2/24/2019 patient has history of BPH on finasteride and Flomax at home those 

medications are resumed but patient is unable to swallow any medications at this

moment patient has a Salazar's catheter draining blood stained urine.  Overall 

prognosis and condition again critical.





2/25/2019 patient has history of BPH and he is on finasteride and Flomax at home

plan is to resume those medications.





2/26/2019-patient history of BPH he is on finasteride and Flomax at home those 

medications are restarted during this hospital stay.





3/4/2019 patient has history of BPH on finasteride and Flomax those medications 

are continued during this hospital stay.








(10) UTI (urinary tract infection)


Qualifiers: 


   Urinary tract infection type: acute cystitis   Hematuria presence: with 

hematuria   Qualified Code(s): N30.01 - Acute cystitis with hematuria   


Is this a current diagnosis for this admission?: Yes   


Plan: 


2/25/2019-patient has a urinary stent placement at [facility].  He came in 

with septic shock.  Most likely source of infection in the urine.  Urine 

analysis initially shows cloudy urine with increased WBC and RBC.  Urine culture

is pending.  Presently on IV vancomycin and Zosyn plan is to continue those 

medications.





2/26/2019-patient has recent history of a urinary stent placement.  Followed by 

at least 2 urinary tract infections.  He was admitted here for sepsis.  Urine 

culture is positive for ESBL E. coli and on Zosyn.  Patient is afebrile.  Plan 

is to continue the present management.





3/4/2019-urine culture shows ESBL E. coli on Zosyn.  I spoke to the pharmacy 

about continuation of the antibiotics we both agree that patient received 8 days

of antibiotics so far plan is to discontinue the antibiotics based on the fact 

that patient is afebrile for more than a week.








- Time


Time Spent with patient: 25-34 minutes


Medications reviewed and adjusted accordingly: Yes


Anticipated discharge: [facility]

## 2019-03-05 LAB
ABSOLUTE LYMPHOCYTES# (MANUAL): 0.7 10^3/UL (ref 0.5–4.7)
ABSOLUTE MONOCYTES # (MANUAL): 0.6 10^3/UL (ref 0.1–1.4)
ABSOLUTE NEUTROPHILS# (MANUAL): 13.4 10^3/UL (ref 1.7–8.2)
ADD MANUAL DIFF: YES
ALBUMIN SERPL-MCNC: 2.6 G/DL (ref 3.5–5)
ALP SERPL-CCNC: 67 U/L (ref 38–126)
ALT SERPL-CCNC: 26 U/L (ref 21–72)
ANION GAP SERPL CALC-SCNC: 9 MMOL/L (ref 5–19)
ANISOCYTOSIS BLD QL SMEAR: (no result)
AST SERPL-CCNC: 22 U/L (ref 17–59)
BASOPHILS NFR BLD MANUAL: 0 % (ref 0–2)
BILIRUB DIRECT SERPL-MCNC: 0.7 MG/DL (ref 0–0.4)
BILIRUB SERPL-MCNC: 1.5 MG/DL (ref 0.2–1.3)
BUN SERPL-MCNC: 16 MG/DL (ref 7–20)
CALCIUM: 8.8 MG/DL (ref 8.4–10.2)
CHLORIDE SERPL-SCNC: 117 MMOL/L (ref 98–107)
CO2 SERPL-SCNC: 23 MMOL/L (ref 22–30)
EOSINOPHIL NFR BLD MANUAL: 0 % (ref 0–6)
ERYTHROCYTE [DISTWIDTH] IN BLOOD BY AUTOMATED COUNT: 20.8 % (ref 11.5–14)
GLUCOSE SERPL-MCNC: 102 MG/DL (ref 75–110)
HCT VFR BLD CALC: 30.7 % (ref 37.9–51)
HGB BLD-MCNC: 10 G/DL (ref 13.5–17)
MCH RBC QN AUTO: 31.2 PG (ref 27–33.4)
MCHC RBC AUTO-ENTMCNC: 32.6 G/DL (ref 32–36)
MCV RBC AUTO: 96 FL (ref 80–97)
MONOCYTES % (MANUAL): 4 % (ref 3–13)
NEUTS BAND NFR BLD MANUAL: 7 % (ref 3–5)
PLATELET # BLD: 147 10^3/UL (ref 150–450)
PLATELET COMMENT: (no result)
POTASSIUM SERPL-SCNC: 3.8 MMOL/L (ref 3.6–5)
PROT SERPL-MCNC: 5.7 G/DL (ref 6.3–8.2)
RBC # BLD AUTO: 3.21 10^6/UL (ref 4.35–5.55)
SEGMENTED NEUTROPHILS % (MAN): 84 % (ref 42–78)
SODIUM SERPL-SCNC: 149.1 MMOL/L (ref 137–145)
TOTAL CELLS COUNTED BLD: 100
VARIANT LYMPHS NFR BLD MANUAL: 5 % (ref 13–45)
WBC # BLD AUTO: 14.7 10^3/UL (ref 4–10.5)

## 2019-03-05 PROCEDURE — B548ZZA ULTRASONOGRAPHY OF SUPERIOR VENA CAVA, GUIDANCE: ICD-10-PCS | Performed by: INTERNAL MEDICINE

## 2019-03-05 PROCEDURE — B518ZZA FLUOROSCOPY OF SUPERIOR VENA CAVA, GUIDANCE: ICD-10-PCS | Performed by: INTERNAL MEDICINE

## 2019-03-05 PROCEDURE — 02HV33Z INSERTION OF INFUSION DEVICE INTO SUPERIOR VENA CAVA, PERCUTANEOUS APPROACH: ICD-10-PCS | Performed by: RADIOLOGY

## 2019-03-05 RX ADMIN — FAMOTIDINE SCH: 20 TABLET, FILM COATED ORAL at 12:00

## 2019-03-05 RX ADMIN — TRAMADOL HYDROCHLORIDE PRN MG: 50 TABLET, FILM COATED ORAL at 05:29

## 2019-03-05 RX ADMIN — SOTALOL HYDROCHLORIDE SCH: 80 TABLET ORAL at 11:59

## 2019-03-05 RX ADMIN — PIPERACILLIN AND TAZOBACTAM SCH MLS/HR: 3; .375 INJECTION, POWDER, LYOPHILIZED, FOR SOLUTION INTRAVENOUS; PARENTERAL at 15:41

## 2019-03-05 RX ADMIN — GUAIFENESIN SCH: 600 TABLET, EXTENDED RELEASE ORAL at 21:32

## 2019-03-05 RX ADMIN — PIPERACILLIN AND TAZOBACTAM SCH MLS/HR: 3; .375 INJECTION, POWDER, LYOPHILIZED, FOR SOLUTION INTRAVENOUS; PARENTERAL at 08:18

## 2019-03-05 RX ADMIN — SOTALOL HYDROCHLORIDE SCH: 80 TABLET ORAL at 21:32

## 2019-03-05 RX ADMIN — FAMOTIDINE SCH: 20 TABLET, FILM COATED ORAL at 21:32

## 2019-03-05 RX ADMIN — MORPHINE SULFATE PRN MG: 10 INJECTION INTRAMUSCULAR; INTRAVENOUS; SUBCUTANEOUS at 03:19

## 2019-03-05 RX ADMIN — PIPERACILLIN AND TAZOBACTAM SCH MLS/HR: 3; .375 INJECTION, POWDER, LYOPHILIZED, FOR SOLUTION INTRAVENOUS; PARENTERAL at 03:19

## 2019-03-05 RX ADMIN — MORPHINE SULFATE PRN MG: 10 INJECTION INTRAMUSCULAR; INTRAVENOUS; SUBCUTANEOUS at 21:43

## 2019-03-05 RX ADMIN — PREDNISONE SCH: 10 TABLET ORAL at 11:59

## 2019-03-05 RX ADMIN — Medication SCH UNIT: at 21:43

## 2019-03-05 RX ADMIN — SIMVASTATIN SCH: 40 TABLET, FILM COATED ORAL at 21:32

## 2019-03-05 RX ADMIN — PIPERACILLIN AND TAZOBACTAM SCH MLS/HR: 3; .375 INJECTION, POWDER, LYOPHILIZED, FOR SOLUTION INTRAVENOUS; PARENTERAL at 21:43

## 2019-03-05 RX ADMIN — GUAIFENESIN SCH: 600 TABLET, EXTENDED RELEASE ORAL at 12:00

## 2019-03-05 RX ADMIN — ACETAMINOPHEN PRN MG: 325 TABLET ORAL at 05:30

## 2019-03-05 RX ADMIN — MAGNESIUM OXIDE TAB 400 MG (241.3 MG ELEMENTAL MG) SCH: 400 (241.3 MG) TAB at 12:00

## 2019-03-05 RX ADMIN — MORPHINE SULFATE PRN MG: 10 INJECTION INTRAMUSCULAR; INTRAVENOUS; SUBCUTANEOUS at 08:26

## 2019-03-05 RX ADMIN — SENNOSIDES, DOCUSATE SODIUM SCH: 50; 8.6 TABLET, FILM COATED ORAL at 12:00

## 2019-03-05 RX ADMIN — SODIUM CHLORIDE SCH ML: 9 INJECTION INTRAMUSCULAR; INTRAVENOUS; SUBCUTANEOUS at 21:43

## 2019-03-05 RX ADMIN — FINASTERIDE SCH: 5 TABLET, FILM COATED ORAL at 12:00

## 2019-03-05 RX ADMIN — TAMSULOSIN HYDROCHLORIDE SCH: 0.4 CAPSULE ORAL at 21:32

## 2019-03-05 RX ADMIN — MAGNESIUM OXIDE TAB 400 MG (241.3 MG ELEMENTAL MG) SCH: 400 (241.3 MG) TAB at 17:08

## 2019-03-05 RX ADMIN — POTASSIUM CHLORIDE SCH: 750 TABLET, FILM COATED, EXTENDED RELEASE ORAL at 12:00

## 2019-03-05 NOTE — RADIOLOGY REPORT (SQ)
EXAM DESCRIPTION:  PICC INSERTION; FLUORO/CV PLACEMENT; U/S GUIDE FOR VASCULAR ACCESS



COMPLETED DATE/TIME:  3/5/2019 11:23 am



REASON FOR STUDY:  PICC LINE INSERTION FOR IV ANTIBIOTICS; IV ABX; IV ACCESS



COMPARISON:  [facility] chest 3/3/2019



FLUOROSCOPY TIME:  4 seconds

1 ultrasound and 1 C-arm fluoro images saved to PACS.



TECHNIQUE:  Fluoroscopic and ultrasound guided PICC placement.



LIMITATIONS:  None.



PROCEDURE:  After written consent and assessment were obtained, the patient was brought into the fluo
roscopy room and placed supine on the table. Ultrasound evaluation of potential access sites were per
formed. After successfully identifying a patent right basilic vein, the right arm was prepped and liban
ped in a sterile fashion along with the ultrasound probe. The entry site was anesthetized with 1% lid
ocaine. A 21 gauge 7 cm needle was advanced through the skin and into the basilic vein under live ult
rasound guidance.  An ultrasound image was saved to PACS confirming access site.  A .018 guide wire w
as then inserted through the needle and into the venous system. The needle was then removed and an 11
 blade scalpel was used to make a 1cm skin incision.  A 5 fr peel-away sheath was advanced over the w
kevan and into the venous system. A measurement was then made using the existing wire and live fluorosc
opic guidance. The wire was then removed and trimmed. The PICC was advanced through the peel-away she
ath and into the venous system. The peel-away sheath was removed and the catheter was adhered to the 
patients arm with a stat lock. The catheter was then aspirated and flushed and a sterile bandage was 
placed over the access site.  A fluoroscopic spot image was saved to PACS confirming the catheter tip
 within the superior vena cava.



IMPRESSION:  SUCCESSFUL PLACEMENT OF A 5 FR DUAL LUMEN 41 CM PICC IN THE RIGHT BASILIC VEIN.



COMMENT:  Patient medication list reviewed: Yes- Quality ID# [id number]:Eligible professional attests to doc
umenting in the medical record they obtained, updated, or reviewed the patient's current medications.
.

Quality :  Final reports for procedures using fluoroscopy that document radiation exposure lópez
yayo, or exposure time and number of fluorographic images (if radiation exposure indices are not avail
able)

Quality ID #[id number]: The patient was prepped and draped using maximum sterile barrier technique including 
cap, mask, sterile gown, sterile gloves, a large sterile sheet, hand hygiene, and 2% Chlorhexidine fo
r cutaneous antisepsis. When ultrasound is used, sterile ultrasound techniques are followed requiring
 sterile gel and sterile probes.



TECHNICAL DOCUMENTATION:  JOB ID:  [id number]

 2011 [employer]- All Rights Reserved                           rev-5/18



Reading location - IP/workstation name: [email or web address]

## 2019-03-05 NOTE — PDOC PROGRESS REPORT
Subjective


Progress Note for:: 03/05/19


Subjective:: 





73-year-old male admitted with sepsis and altered mental status.  Unable to 

swallow anything.  Salazar's catheter shows blood stained urine.  Lactic acid is 

2.2 last night.  Plan is to increase the IV fluids to 150 cc/h do the bladder 

irrigation and repeated lactic acid levels today.  Today's labs are pending.  

Patient states he is in the hospital other than that unable to continue 

communication.  Keep on saying he wants to urinate ,explained to him that he has

a catheter in place.  Overall prognosis poor condition is critical.  Tried to 

call the son to give an update and unable to leave the message.





2/25/2019-patient is more alert more awake today.  Able to give his birthday, he

did know where he is at.  Therapy is working with the patient today.  Patient s

hows significant improvement.  We are going to try to give p.o. medications this

morning.  No acute events in last 24 hours.  Patient is afebrile.





2/26/2019-patient is communicating well no acute events in the last 24 hours.  

Patient is afebrile.  Patient said he had do not have any problems swallowing 

liquids but has a problem swallowing the solid food.  Yesterday he is able to 

take the pills without any problem.  Speech is aware they could reevaluate him 

tomorrow.  No other complaints.





3/4/2019 patient is comfortably in the bed.  No acute events in the last 24 

hours.  Patient is afebrile.  T-max is 97.7.  WBC went up to 15,700 today.  Plan

to recheck CBC tomorrow.





T-max is 98.3.  Patient is going to stay until Thursday and will be transferred 

to [facility] from there he will go to [facility].


Reason For Visit: 


RENAL STONE








Physical Exam


Vital Signs: 


                                        











Temp Pulse Resp BP Pulse Ox


 


 98.3 F   74   20   123/71   96 


 


 03/05/19 00:02  03/05/19 00:02  03/05/19 00:02  03/05/19 00:02  03/05/19 00:02








                                 Intake & Output











 03/04/19 03/05/19 03/06/19





 06:59 06:59 06:59


 


Intake Total 400 1900 100


 


Output Total 4275 4100 


 


Balance -3875 -2200 100


 


Weight 66.9 kg 69.5 kg 











General appearance: PRESENT: mild distress


Head exam: PRESENT: atraumatic


Eye exam: PRESENT: PERRLA


Mouth exam: PRESENT: moist, tongue midline


Neck exam: ABSENT: carotid bruit, JVD, lymphadenopathy, thyromegaly


Respiratory exam: PRESENT: decreased breath sounds


Cardiovascular exam: PRESENT: irregular rhythm, tachycardia


GI/Abdominal exam: PRESENT: normal bowel sounds, soft.  ABSENT: distended, 

guarding, mass, organolmegaly, rebound, tenderness


Extremities exam: PRESENT: full ROM.  ABSENT: calf tenderness, clubbing, pedal 

edema


Neurological exam: PRESENT: alert, awake, oriented to person, oriented to place,

oriented to time, oriented to situation, CN II-XII grossly intact.  ABSENT: 

motor sensory deficit


Psychiatric exam: PRESENT: appropriate affect, normal mood.  ABSENT: homicidal 

ideation, suicidal ideation





Results


Laboratory Results: 


                                        





                                 03/05/19 04:53 





                                 03/05/19 04:53 





                                        











  03/05/19 03/05/19





  04:53 04:53


 


WBC  14.7 H 


 


RBC  3.21 L 


 


Hgb  10.0 L 


 


Hct  30.7 L 


 


MCV  96 


 


MCH  31.2 


 


MCHC  32.6 


 


RDW  20.8 H 


 


Plt Count  147 L 


 


Seg Neutrophils %  Not Reportable 


 


Lymphocytes %  Not Reportable 


 


Monocytes %  Not Reportable 


 


Eosinophils %  Not Reportable 


 


Basophils %  Not Reportable 


 


Absolute Neutrophils  Not Reportable 


 


Absolute Lymphocytes  Not Reportable 


 


Absolute Monocytes  Not Reportable 


 


Absolute Eosinophils  Not Reportable 


 


Absolute Basophils  Not Reportable 


 


Sodium   149.1 H


 


Potassium   3.8


 


Chloride   117 H


 


Carbon Dioxide   23


 


Anion Gap   9


 


BUN   16


 


Creatinine   0.79


 


Est GFR ( Amer)   > 60


 


Est GFR (Non-Af Amer)   > 60


 


Glucose   102


 


Calcium   8.8


 


Magnesium   2.1


 


Total Bilirubin   1.5 H


 


AST   22


 


ALT   26


 


Alkaline Phosphatase   67


 


Total Protein   5.7 L


 


Albumin   2.6 L








                                        











  02/23/19 02/23/19 02/23/19





  12:00 18:19 18:19


 


Creatine Kinase   23 L 


 


Troponin I  0.021   0.021














  02/24/19 02/24/19 02/24/19





  00:33 00:33 08:13


 


Creatine Kinase  35 L   24 L


 


Troponin I   0.022 














  02/24/19





  08:13


 


Creatine Kinase 


 


Troponin I  < 0.012











Impressions: 


                                        





Abdomen/Pelvis CT  02/23/19 00:00


IMPRESSION:


1. Unchanged nonobstructing 7 mm stone within the mid left ureter.  New 4 mm 

stone within the left renal pelvis.  Unchanged 1.2 cm stone within the superior 

calyx of the left kidney.  No hydronephrosis.


2. Unchanged positioning of the left ureteral stent.


3. No additional changes when compared to 2/22/2019.


 








Head CT  02/23/19 11:35


IMPRESSION:  CHRONIC CHANGES OF ATROPHY AND MICROVASCULAR ISCHEMIA.  NO ACUTE 

PROCESS.


EVIDENCE OF ACUTE STROKE: NO.


 








Chest X-Ray  03/03/19 15:15


IMPRESSION:  NO ACUTE RADIOGRAPHIC FINDING IN THE CHEST.


 














Assessment & Plan





- Diagnosis


(1) Altered mental status


Qualifiers: 


   Altered mental status type: unspecified   Qualified Code(s): R41.82 - Altered

mental status, unspecified   


Is this a current diagnosis for this admission?: Yes   


Plan: 


2/23/2019 patient is going to be admitted for altered mental status most likely 

secondary to sepsis.  Is going to be placed in IMCU.  Started on IV fluids 

normal saline at 100 cc/h, to repeat the lactic acid levels, started on IV Zosyn

and IV vancomycin.  We are going to adjust the doses as per pharmacy 

recommendations.  Blood cultures urine culture was sent.  Aspiration fall 

precautions are requested.  Neurochecks are requested for every 4 hours for the 

next 24 hours.  Swallowing evaluation will be done today.  CT head was negative 

for acute changes.  To start on IV Ativan 1 mg every 8 hours as needed for 

agitation.





2/24/2019-altered mental status/acute encephalopathy most likely secondary to 

sepsis.  Presently he is on IV Zosyn and vancomycin.  Urine cultures blood 

cultures are pending.  CT head was negative.  He failed swallowing evaluation.  

We are going to request for PT OT consult and discharge planner.  Requested for 

neuro checks.  Aspiration fall  Seizure precautions are requested.  Overall 

prognosis poor condition is critical.





2/25/2019 altered mental status/acute encephalopathy most likely secondary to 

sepsis.  Blood cultures are negative so far.  Patient is presently on IV Zosyn 

and vancomycin.  It is much more alert and oriented.  Be going to try to give 

his morning p.o. medications.  Therapy is working with the patient.  Aspiration 

fall seizure precautions are requested.





2/26/2019-altered mental status/acute encephalopathy most likely secondary to 

sepsis is resolved.  Patient said he has a difficulty in swallowing this morning

we going to reevaluate by placing a speech consult for tomorrow.  Started on 

pured diet from today.





3/4/2019-altered mental status/acute encephalopathy most likely secondary to 

sepsis which was resolved.  He is back at the baseline.





3/5/2019-patient was admitted with altered mental status/acute encephalopathy.  

Which is most likely secondary to urosepsis.  Patient mental status is back at 

baseline.








(2) Sepsis


Qualifiers: 


   Sepsis type: Escherichia coli   Qualified Code(s): A41.51 - Sepsis due to 

Escherichia coli [E. coli]   


Is this a current diagnosis for this admission?: Yes   


Plan: 


2/23/2019 patient is in septic stock when he came in blood pressure was 78/50 

with IV fluids latest blood pressure is 124/60 initially on presentation is 

tachycardic heart rate is 106 tachypneic respiratory of 24, lactic acid level is

3.1 with altered mental status.  And is to continue IV fluids normal saline at 

100 cc/h started on IV vancomycin and Zosyn, repeat the lactic acid level this 

evening daily labs.  Blood cultures urine cultures are pending.  Chest x-ray 

does not show any pneumonia.  Urinalysis is cloudy with leukocyte esterase po

sitive, WBC more than 5 RBCs 182.  Most likely cause of sepsis is urinary tract 

infection.





2/24/2019-patient's respiratory rate is 31, blood pressure is 124/79 improved.  

Latest lactic acid is 0.2.  Urine cultures blood cultures are pending.  Patient 

is in altered mental status.  In my opinion patient is in septic shock.  Patient

is full code.  Plan is to continue IV fluids, IV Zosyn and IV vancomycin.





2/25/2019-patient's T-max is 98.4 pulse rate is 93.  Blood pressure today is 

138/71.  Plan is to continue the IV antibiotic therapy and to decrease the IV 

infusion of the fluids.  Cultures are pending.  Presently on IV Zosyn and 

vancomycin plan is to continue those antibiotics.





2/26/2019 patient was admitted with septic shock blood cultures are negative 

urine culture shows ESBL coli.  Blood pressure today 130/63 temperature 97.3 

pulse rate 62, WBC count is 8300.  Septic shock is resolved.





3/4/2019 patient is admitted with altered mental status blood cultures are 

negative urine culture is positive for ESBL E. coli.  Presently on IV Zosyn.





3/5/2019-patient is admitted with altered mental status blood cultures are 

negative urine culture is positive for ESBL E. coli the urologist told me 

patient has at least 2 UTIs after stent placement and this is third episode of 

UTI he prefers to continue IV antibiotic therapy until the ureteral stent was 

removed.








(3) Leukocytosis


Qualifiers: 


   Leukocytosis type: unspecified   Qualified Code(s): D72.829 - Elevated white 

blood cell count, unspecified   


Is this a current diagnosis for this admission?: Yes   


Plan: 


2/23/2019 admission WBC count is 10,000 most likely secondary to sepsis.





2/24/2019-yesterday's WBC count is 18,000 today's labs are pending leukocytosis 

most likely secondary to sepsis.





2/25/2019-today's labs are pending.  Admission WBC is 18,000 yesterday WBC came 

down to 14,500.  Plan is to continue the present management.  Leukocytosis is 

resolving.





2/26/2019-on admission WBC count is 18,100 today WBC count is 8300 leukocytosis 

secondary to sepsis is resolved.





3/4/2019-WBC count today 15,700.  Patient is on prednisone 10 mg .  Plan is to 

discontinue IV antibiotics today because patient received already 8 days of 

antibiotic therapy.


pt is afebrile for the last 1 week.





3/5/2019-WBC count today is 14,700 improved from yesterday.  Patient is 

afebrile.  Patient is on prednisone 10 mg daily for rheumatoid arthritis.  Plan 

is to continue the present management.








(4) COPD (chronic obstructive pulmonary disease)


Is this a current diagnosis for this admission?: No   


Plan: 


2/23/2019 patient has history of COPD not on home oxygen he is ex-smoker quit 

smoking 15 years ago.  He uses Brio and albuterol inhaler at home started him on

Xopenex nebulizations every 6 hours as needed.





2/24/2019-patient has history of COPD he is on nebulizer treatments.  He was 

pulse ox are in the 90s.





2/25/2019 patient has history of COPD his pulse ox is 92% on room air. he Is 

getting Xopenex nebulizer treatments.  Plan is to continue the present 

management.





2/26/2019-patient has history of COPD not on home oxygen.  Pulse ox is 82% on 

room air this morning.  He is on Xopenex nebulizations.  Chest x-ray negative 

for pneumonia.  Plan is to continue the present management.





3/4/2019-patient has history of COPD yesterday's pulse ox is noted to drop to 

85% chest x-ray was done which was negative.  Today patient is on 4 L of oxygen 

pulse ox is 98% I decreased the oxygen requirements to 2 L to see and check the 

oxygen saturation.





3/5/2019-patient has history of COPD not on home oxygen.  Pulse ox is 96% on 1-

1/2 L oxygen.  Chest x-ray negative for pneumonia.  He managed to go to a 

nursing home with  oxygen via nasal cannula.  On examination chest bilateral 

entry was decreased no wheezing no crepitations or heard.








(5) Coronary artery disease


Is this a current diagnosis for this admission?: No   


Plan: 


2/23/2019-patient has history of coronary artery disease status post stent 

placement multiple times.  Plan to do the serial cardiac enzymes and troponins. 

Initial troponin is less than 0.021.  Patient is on aspirin Plavix and 

simvastatin at home those medications are going to be resumed.





2/24/2019-patient has history of coronary artery disease previous history of 

multiple stents placement.  At home he is on aspirin, Plavix, simvastatin.  We 

resumed his home medications.  But patient is unable to swallow failed 

swallowing evaluation because of  altered mental status.





2/25/2019-patient has history of coronary artery disease with multiple stent 

placement.  At home he is on aspirin, Plavix, simvastatin plan is to continue 

with his home medications.  Patient failed his swallowing evaluation yesterday 

is much more alert and oriented today hopefully he able to take his oral 

medications.





2/26/2019-patient has history of coronary artery disease status post stent 

placement multiple times.  Patient is on aspirin, Plavix, and simvastatin.  

Resumed those   medications during the hospital stay.





3/4/2019-patient has history of coronary artery disease status post stent 

placement presently is on aspirin and Plavix and simvastatin plan is to continue

the present management.





2/5/2019-patient has history of coronary artery disease status post stent 

placement multiple times.  Presently on aspirin on Plavix, simvastatin.  Plan is

to discontinue aspirin and Plavix in preparation for ureteral stent removed on 

Thursday.








(6) Rheumatoid arthritis


Is this a current diagnosis for this admission?: No   


Plan: 


2/23/2019 patient has history of rheumatoid arthritis on methotrexate and 

prednisone at home those medications are restarted again.





2/24/2019-patient has history of rheumatoid arthritis on methotrexate and p.o. 

prednisone at home.  Those medications are resumed but the patient is unable to 

swallow the pills at this moment.





2/25/2019-patient has history of rheumatoid arthritis on methotrexate and 

prednisone at home, meds will be restarted once able to swallow the medications.





2/26/2019-patient has history of rheumatoid arthritis on methotrexate and 

prednisone these medications are restarted during the hospital stay.





3/4/2019-patient has history of rheumatoid arthritis on methotrexate and 

prednisone at home these medications are continued during the hospital stay.





3/5/2019-patient has history of rheumatoid arthritis on methotrexate and p.o. 

prednisone.  These home medications are resumed during the hospital stay.








(7) Paroxysmal atrial fibrillation


Is this a current diagnosis for this admission?: No   


Plan: 


2/23/2019-patient has history of paroxysmal atrial fibrillation on warfarin 1 mg

p.o. daily, Plavix 75 mg p.o. daily, aspirin 81 mg p.o. daily these medications 

he is taking at home plan is to resume those medications in the hospital.





2/24/2019-patient is giving the history of his dad having paroxysmal atrial 

fibrillation, patient in sinus rhythm here.  We resumed his warfarin yesterday 

but is unable to take any oral medications at this point.





2/25/2019-patient is has a history of paroxysmal atrial fibrillation he is in 

sinus rhythm he is on warfarin at home.  We are going to check his PT/INR on 

daily basis.





2/26/2018-patient has history of paroxysmal atrial fibrillation, during his h

ospital stay he is on sinus rhythm.  According to his son warfarin was 

discontinued recently.  INR today is 1.71.  As per the son's request we going to

discontinue warfarin.  Be placed on a Lovenox 40 mg subcu daily.





3/4/2019-patient has history of paroxysmal atrial fibrillation presently is in 

sinus rhythm as per the son patient was on warfarin before and it was 

discontinued.  Getting Lovenox for DVT prophylaxis.  The catheter bag looks 

clean urine.  Looks like hematuria is resolving.





3/5/2019-patient has history of paroxysmal atrial fibrillation he is on warfarin

before, as per the son's request pt  is off warfarin now.  Presently on aspirin 

and Plavix and Lovenox those are discontinued in preparation for PICC line today

and also in preparation  for ureteral stent  removal on Thursday at [facility].








(8) History of AAA (abdominal aortic aneurysm) repair


Is this a current diagnosis for this admission?: No   


Plan: 


2/23/2019 patient has history of abdominal aortic aneurysm status post graft 

placement in 2010 as per the patient's son.





2/24/2019 patient has history of aortic aneurysm status post graft placement in 

2010.  CT scan of the abdomen does not show any abnormalities.








2/26/2019-patient has history of abdominal aortic aneurysm status post graft 

placement in 2010.  CT scan during this time patient does not show any acute 

abnormalities.





3/4/2019-patient has history of abdominal aortic aneurysm status post graft 

placement 2010 no acute events and during the hospital stay.  Plan is to 

continue the present management.





3/5/2019-patient has history of abdominal aortic aneurysm status post graft 

placement in 2010.  No acute events during this hospital stay.








(9) BPH (benign prostatic hyperplasia)


Is this a current diagnosis for this admission?: No   


Plan: 


2/23/2019 patient has history of BPH on finasteride and Flomax at home those 

medications are restarted during this hospital stay.





2/24/2019 patient has history of BPH on finasteride and Flomax at home those 

medications are resumed but patient is unable to swallow any medications at this

moment patient has a Salazar's catheter draining blood stained urine.  Overall 

prognosis and condition again critical.





2/25/2019 patient has history of BPH and he is on finasteride and Flomax at home

plan is to resume those medications.





2/26/2019-patient history of BPH he is on finasteride and Flomax at home those 

medications are restarted during this hospital stay.





3/4/2019 patient has history of BPH on finasteride and Flomax those medications 

are continued during this hospital stay.





3/5/2019-patient has history of benign prostatic hypertrophy on finasteride and 

Flomax at home those medications are continued during this hospital stay.  

Patient has a Salazar's catheter with continuous bladder irrigation and urine is 

clear without any hematuria now.








(10) UTI (urinary tract infection)


Qualifiers: 


   Urinary tract infection type: acute cystitis   Hematuria presence: with 

hematuria   Qualified Code(s): N30.01 - Acute cystitis with hematuria   


Is this a current diagnosis for this admission?: Yes   


Plan: 


2/25/2019-patient has a urinary stent placement at [facility].  He came in 

with septic shock.  Most likely source of infection in the urine.  Urine 

analysis initially shows cloudy urine with increased WBC and RBC.  Urine culture

is pending.  Presently on IV vancomycin and Zosyn plan is to continue those 

medications.





2/26/2019-patient has recent history of a urinary stent placement.  Followed by 

at least 2 urinary tract infections.  He was admitted here for sepsis.  Urine 

culture is positive for ESBL E. coli and on Zosyn.  Patient is afebrile.  Plan 

is to continue the present management.





3/4/2019-urine culture shows ESBL E. coli on Zosyn.  I spoke to the pharmacy 

about continuation of the antibiotics we both agree that patient received 8 days

of antibiotics so far plan is to discontinue the antibiotics based on the fact 

that patient is afebrile for more than a week.





3/5/2019 urine culture is positive for ESBL E. coli on IV Zosyn I spoke to 

urologist yesterday according to him patient already has at least 2 episodes of 

UTI requiring antibiotics after ureteral stent placement this is the third 

episode of UTI, he preferred to keep the patient on IV antibiotic therapy until 

the ureteral stent was removed.  So the plan is to continue IV Zosyn.








- Time


Time Spent with patient: 25-34 minutes


Medications reviewed and adjusted accordingly: Yes


Anticipated discharge: [facility]

## 2019-03-05 NOTE — RADIOLOGY REPORT (SQ)
EXAM DESCRIPTION:  PICC INSERTION; FLUORO/CV PLACEMENT; U/S GUIDE FOR VASCULAR ACCESS



COMPLETED DATE/TIME:  3/5/2019 11:23 am



REASON FOR STUDY:  PICC LINE INSERTION FOR IV ANTIBIOTICS; IV ABX; IV ACCESS



COMPARISON:  AP chest 3/3/2019



FLUOROSCOPY TIME:  4 seconds

1 ultrasound and 1 C-arm fluoro images saved to PACS.



TECHNIQUE:  Fluoroscopic and ultrasound guided PICC placement.



LIMITATIONS:  None.



PROCEDURE:  After written consent and assessment were obtained, the patient was brought into the fluo
roscopy room and placed supine on the table. Ultrasound evaluation of potential access sites were per
formed. After successfully identifying a patent right basilic vein, the right arm was prepped and liban
ped in a sterile fashion along with the ultrasound probe. The entry site was anesthetized with 1% lid
ocaine. A 21 gauge 7 cm needle was advanced through the skin and into the basilic vein under live ult
rasound guidance.  An ultrasound image was saved to PACS confirming access site.  A .018 guide wire w
as then inserted through the needle and into the venous system. The needle was then removed and an 11
 blade scalpel was used to make a 1cm skin incision.  A 5 fr peel-away sheath was advanced over the w
kevan and into the venous system. A measurement was then made using the existing wire and live fluorosc
opic guidance. The wire was then removed and trimmed. The PICC was advanced through the peel-away she
ath and into the venous system. The peel-away sheath was removed and the catheter was adhered to the 
patients arm with a stat lock. The catheter was then aspirated and flushed and a sterile bandage was 
placed over the access site.  A fluoroscopic spot image was saved to PACS confirming the catheter tip
 within the superior vena cava.



IMPRESSION:  SUCCESSFUL PLACEMENT OF A 5 FR DUAL LUMEN 41 CM PICC IN THE RIGHT BASILIC VEIN.



COMMENT:  Patient medication list reviewed: Yes- Quality ID# [id number]:Eligible professional attests to doc
umenting in the medical record they obtained, updated, or reviewed the patient's current medications.
.

Quality :  Final reports for procedures using fluoroscopy that document radiation exposure lópez
yayo, or exposure time and number of fluorographic images (if radiation exposure indices are not avail
able)

Quality ID #[id number]: The patient was prepped and draped using maximum sterile barrier technique including 
cap, mask, sterile gown, sterile gloves, a large sterile sheet, hand hygiene, and 2% Chlorhexidine fo
r cutaneous antisepsis. When ultrasound is used, sterile ultrasound techniques are followed requiring
 sterile gel and sterile probes.



TECHNICAL DOCUMENTATION:  JOB ID:  [id number]

 2011 [employer]- All Rights Reserved                           rev-5/18



Reading location - IP/workstation name: [email or web address]

## 2019-03-05 NOTE — RADIOLOGY REPORT (SQ)
EXAM DESCRIPTION:  CT CHEST WITHOUT



COMPLETED DATE/TIME:  3/5/2019 3:22 pm



REASON FOR STUDY:  shortness of breath



COMPARISON:  CT angio chest 9/22/2018



TECHNIQUE:  CT scan performed of the chest without intravenous contrast.  Images reviewed with lung, 
soft tissue and bone windows.  Reconstructed coronal and sagittal MPR images reviewed.  All images st
ored on PACS.

All CT scanners at this facility use dose modulation, iterative reconstruction, and/or weight based d
osing when appropriate to reduce radiation dose to as low as reasonably achievable (ALARA).

[facility]: Dose Right  [facility]: CareDose    [facility]: Dose Right    [facility]: Teradose 4D    [facility]: Smart Technologies



RADIATION DOSE:  CT Rad equipment meets quality standard of care and radiation dose reduction techniq
ues were employed. CTDIvol: 12.5 mGy. DLP: 450 mGy-cm. mGy.



LIMITATIONS:  No technical limitations.



FINDINGS:  LUNGS AND PLEURA: Patchy consolidation is present in the bilateral lower lobes worrisome f
or pneumonia, aspiration be considered.  There is debris in the trachea and right and left proximal m
ainstem bronchi.

Linear bandlike scarring is present at both lung apices similar compared to 9/22/2018 CT.

There is obstructive lung disease with enlarged airspaces in the upper lobes bilaterally.

No pleural effusion.  No pneumothorax.

HILAR AND MEDIASTINAL STRUCTURES: No identified masses or abnormal nodes.  No obvious aneurysm.

HEART AND VASCULAR STRUCTURES: Ascending thoracic aorta 4.3 cm in diameter, stable.  Heavy coronary a
rtery calcifications.  No pericardial effusion.

UPPER ABDOMEN: No significant findings.  Limited exam.

THYROID AND OTHER SOFT TISSUES: No masses.  No adenopathy.

BONES: Interval performance of an T9 kyphoplasty CT chest 9/22/2018.  Stable 50% compression deformit
ies without kyphoplasty at T6, T7, and T12

HARDWARE: None in the chest.

OTHER: No other significant findings.



IMPRESSION:  Bibasilar pneumonia, aspiration should be considered



TECHNICAL DOCUMENTATION:  JOB ID:  [id number]

Quality ID # 436: Final reports with documentation of one or more dose reduction techniques (e.g., Au
tomated exposure control, adjustment of the mA and/or kV according to patient size, use of iterative 
reconstruction technique)

 2011 [employer]- All Rights Reserved



Reading location - IP[facility]workstation name: [facility]

## 2019-03-06 LAB
ABSOLUTE LYMPHOCYTES# (MANUAL): 0.2 10^3/UL (ref 0.5–4.7)
ABSOLUTE MONOCYTES # (MANUAL): 0.1 10^3/UL (ref 0.1–1.4)
ABSOLUTE NEUTROPHILS# (MANUAL): 1 10^3/UL (ref 1.7–8.2)
ADD MANUAL DIFF: YES
ALBUMIN SERPL-MCNC: 2.4 G/DL (ref 3.5–5)
ALP SERPL-CCNC: 65 U/L (ref 38–126)
ALT SERPL-CCNC: 14 U/L (ref 21–72)
ANION GAP SERPL CALC-SCNC: 7 MMOL/L (ref 5–19)
ANISOCYTOSIS BLD QL SMEAR: (no result)
ARTERIAL BLOOD FIO2: (no result)
ARTERIAL BLOOD H2CO3: 1.11 MMOL/L (ref 1.05–1.35)
ARTERIAL BLOOD HCO3: 23.8 MMOL/L (ref 20–24)
ARTERIAL BLOOD PCO2: 37 MMHG (ref 35–45)
ARTERIAL BLOOD PH: 7.43 (ref 7.35–7.45)
ARTERIAL BLOOD PO2: 88.4 MMHG (ref 80–100)
ARTERIAL BLOOD TOTAL CO2: 25 MMOL/L (ref 23–27)
AST SERPL-CCNC: 21 U/L (ref 17–59)
BASE EXCESS BLDA CALC-SCNC: -0.3 MMOL/L
BASOPHILS NFR BLD MANUAL: 2 % (ref 0–2)
BILIRUB DIRECT SERPL-MCNC: 0.6 MG/DL (ref 0–0.4)
BILIRUB SERPL-MCNC: 1.2 MG/DL (ref 0.2–1.3)
BUN SERPL-MCNC: 23 MG/DL (ref 7–20)
CALCIUM: 8.7 MG/DL (ref 8.4–10.2)
CHLORIDE SERPL-SCNC: 123 MMOL/L (ref 98–107)
CO2 SERPL-SCNC: 22 MMOL/L (ref 22–30)
EOSINOPHIL NFR BLD MANUAL: 6 % (ref 0–6)
ERYTHROCYTE [DISTWIDTH] IN BLOOD BY AUTOMATED COUNT: 20.8 % (ref 11.5–14)
GLUCOSE SERPL-MCNC: 106 MG/DL (ref 75–110)
HCT VFR BLD CALC: 29.3 % (ref 37.9–51)
HGB BLD-MCNC: 9.5 G/DL (ref 13.5–17)
MCH RBC QN AUTO: 31.2 PG (ref 27–33.4)
MCHC RBC AUTO-ENTMCNC: 32.4 G/DL (ref 32–36)
MCV RBC AUTO: 96 FL (ref 80–97)
MONOCYTES % (MANUAL): 8 % (ref 3–13)
NEUTS BAND NFR BLD MANUAL: 6 % (ref 3–5)
OVALOCYTES BLD QL SMEAR: SLIGHT
PATH REV BLD -IMP: (no result)
PLATELET # BLD: 141 10^3/UL (ref 150–450)
PLATELET COMMENT: (no result)
POIKILOCYTOSIS BLD QL SMEAR: SLIGHT
POLYCHROMASIA BLD QL SMEAR: SLIGHT
POTASSIUM SERPL-SCNC: 3.4 MMOL/L (ref 3.6–5)
PROT SERPL-MCNC: 5.3 G/DL (ref 6.3–8.2)
RBC # BLD AUTO: 3.05 10^6/UL (ref 4.35–5.55)
SAO2 % BLDA: 97 % (ref 94–98)
SEGMENTED NEUTROPHILS % (MAN): 66 % (ref 42–78)
SODIUM SERPL-SCNC: 151.7 MMOL/L (ref 137–145)
TOTAL CELLS COUNTED BLD: 50
VARIANT LYMPHS NFR BLD MANUAL: 12 % (ref 13–45)
WBC # BLD AUTO: 1.4 10^3/UL (ref 4–10.5)

## 2019-03-06 PROCEDURE — 3E0F3GC INTRODUCTION OF OTHER THERAPEUTIC SUBSTANCE INTO RESPIRATORY TRACT, PERCUTANEOUS APPROACH: ICD-10-PCS | Performed by: INTERNAL MEDICINE

## 2019-03-06 RX ADMIN — Medication SCH UNIT: at 10:09

## 2019-03-06 RX ADMIN — SENNOSIDES, DOCUSATE SODIUM SCH: 50; 8.6 TABLET, FILM COATED ORAL at 10:02

## 2019-03-06 RX ADMIN — FINASTERIDE SCH: 5 TABLET, FILM COATED ORAL at 10:02

## 2019-03-06 RX ADMIN — MORPHINE SULFATE PRN MG: 10 INJECTION INTRAMUSCULAR; INTRAVENOUS; SUBCUTANEOUS at 05:14

## 2019-03-06 RX ADMIN — PIPERACILLIN AND TAZOBACTAM SCH MLS/HR: 3; .375 INJECTION, POWDER, LYOPHILIZED, FOR SOLUTION INTRAVENOUS; PARENTERAL at 15:11

## 2019-03-06 RX ADMIN — LORAZEPAM PRN MG: 2 INJECTION INTRAMUSCULAR; INTRAVENOUS at 01:19

## 2019-03-06 RX ADMIN — POTASSIUM CHLORIDE SCH: 750 TABLET, FILM COATED, EXTENDED RELEASE ORAL at 10:01

## 2019-03-06 RX ADMIN — VANCOMYCIN HYDROCHLORIDE SCH MLS/HR: 1 INJECTION, POWDER, LYOPHILIZED, FOR SOLUTION INTRAVENOUS at 21:19

## 2019-03-06 RX ADMIN — MAGNESIUM OXIDE TAB 400 MG (241.3 MG ELEMENTAL MG) SCH: 400 (241.3 MG) TAB at 10:01

## 2019-03-06 RX ADMIN — FAMOTIDINE SCH: 20 TABLET, FILM COATED ORAL at 10:02

## 2019-03-06 RX ADMIN — SODIUM CHLORIDE SCH ML: 9 INJECTION INTRAMUSCULAR; INTRAVENOUS; SUBCUTANEOUS at 10:10

## 2019-03-06 RX ADMIN — GUAIFENESIN SCH: 600 TABLET, EXTENDED RELEASE ORAL at 10:01

## 2019-03-06 RX ADMIN — Medication SCH UNIT: at 21:17

## 2019-03-06 RX ADMIN — LORAZEPAM PRN MG: 2 INJECTION INTRAMUSCULAR; INTRAVENOUS at 16:01

## 2019-03-06 RX ADMIN — MORPHINE SULFATE PRN MG: 10 INJECTION INTRAMUSCULAR; INTRAVENOUS; SUBCUTANEOUS at 12:03

## 2019-03-06 RX ADMIN — LORAZEPAM PRN MG: 2 INJECTION INTRAMUSCULAR; INTRAVENOUS at 18:43

## 2019-03-06 RX ADMIN — SODIUM CHLORIDE SCH ML: 9 INJECTION INTRAMUSCULAR; INTRAVENOUS; SUBCUTANEOUS at 21:17

## 2019-03-06 RX ADMIN — MORPHINE SULFATE PRN MG: 10 INJECTION INTRAMUSCULAR; INTRAVENOUS; SUBCUTANEOUS at 15:04

## 2019-03-06 RX ADMIN — VANCOMYCIN HYDROCHLORIDE SCH MLS/HR: 1 INJECTION, POWDER, LYOPHILIZED, FOR SOLUTION INTRAVENOUS at 10:43

## 2019-03-06 RX ADMIN — PREDNISONE SCH: 10 TABLET ORAL at 10:01

## 2019-03-06 RX ADMIN — PIPERACILLIN AND TAZOBACTAM SCH MLS/HR: 3; .375 INJECTION, POWDER, LYOPHILIZED, FOR SOLUTION INTRAVENOUS; PARENTERAL at 03:22

## 2019-03-06 RX ADMIN — MORPHINE SULFATE PRN MG: 10 INJECTION INTRAMUSCULAR; INTRAVENOUS; SUBCUTANEOUS at 18:43

## 2019-03-06 RX ADMIN — MEROPENEM SCH MLS/HR: 1 INJECTION INTRAVENOUS at 21:16

## 2019-03-06 RX ADMIN — PIPERACILLIN AND TAZOBACTAM SCH MLS/HR: 3; .375 INJECTION, POWDER, LYOPHILIZED, FOR SOLUTION INTRAVENOUS; PARENTERAL at 10:09

## 2019-03-06 RX ADMIN — SOTALOL HYDROCHLORIDE SCH: 80 TABLET ORAL at 10:00

## 2019-03-06 RX ADMIN — ENOXAPARIN SODIUM SCH MG: 30 INJECTION SUBCUTANEOUS at 10:10

## 2019-03-06 NOTE — RADIOLOGY REPORT (SQ)
EXAM DESCRIPTION:  CT HEAD WITHOUT



COMPLETED DATE/TIME:  3/6/2019 1:07 pm



REASON FOR STUDY:  altered mental status



COMPARISON:  CT brain 3/21/2018, 12/20/2018, 2/23/2019



TECHNIQUE:  Axial images acquired through the brain without intravenous contrast.  Images reviewed wi
th bone, brain and subdural windows.  Additional sagittal and coronal reconstructions were generated.
 Images stored on PACS.

All CT scanners at this facility use dose modulation, iterative reconstruction, and/or weight based d
osing when appropriate to reduce radiation dose to as low as reasonably achievable (ALARA).

[facility]: Dose Right  [facility]: CareDose    [facility]: Dose Right    [facility]: Teradose 4D    [facility]: [employer]



RADIATION DOSE:  CT Rad equipment meets quality standard of care and radiation dose reduction techniq
ues were employed. CTDIvol: 48.6 mGy. DLP: 930 mGy-cm. mGy.



LIMITATIONS:  None.



FINDINGS:  VENTRICLES: Mildly prominent due to diffuse atrophy, stable

CEREBRUM: No masses.  No hemorrhage.  No midline shift.  No evidence for acute infarction. Old infarc
t right posterior temporal lobe with focal encephalomalacia, unchanged from prior studies.

CEREBELLUM: No masses.  No hemorrhage.  No alteration of density.  No evidence for acute infarction.

EXTRAAXIAL SPACES: Mildly prominent due to diffuse atrophy

ORBITS AND GLOBE: No intra- or extraconal masses.  Normal contour of globe without masses.

CALVARIUM: No fracture.

PARANASAL SINUSES: No fluid or mucosal thickening.

SOFT TISSUES: No mass or hematoma.

OTHER: No other significant finding.



IMPRESSION:  No acute findings.

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  Diffuse atrophy, stable

EVIDENCE OF ACUTE STROKE: NO.



COMMENT:  Quality ID # 436: Final reports with documentation of one or more dose reduction techniques
 (e.g., Automated exposure control, adjustment of the mA and/or kV according to patient size, use of 
iterative reconstruction technique)



TECHNICAL DOCUMENTATION:  JOB ID:  [id number]

 2011 [employer]- All Rights Reserved



Reading location - IP/workstation name: [email or web address]

## 2019-03-06 NOTE — PDOC PROGRESS REPORT
Subjective


Progress Note for:: 03/06/19


Subjective:: 





73-year-old male admitted with sepsis and altered mental status.  Unable to 

swallow anything.  Salazar's catheter shows blood stained urine.  Lactic acid is 

2.2 last night.  Plan is to increase the IV fluids to 150 cc/h do the bladder 

irrigation and repeated lactic acid levels today.  Today's labs are pending.  

Patient states he is in the hospital other than that unable to continue 

communication.  Keep on saying he wants to urinate ,explained to him that he has

a catheter in place.  Overall prognosis poor condition is critical.  Tried to 

call the son to give an update and unable to leave the message.





2/25/2019-patient is more alert more awake today.  Able to give his birthday, he

did know where he is at.  Therapy is working with the patient today.  Patient s

hows significant improvement.  We are going to try to give p.o. medications this

morning.  No acute events in last 24 hours.  Patient is afebrile.





2/26/2019-patient is communicating well no acute events in the last 24 hours.  

Patient is afebrile.  Patient said he had do not have any problems swallowing 

liquids but has a problem swallowing the solid food.  Yesterday he is able to 

take the pills without any problem.  Speech is aware they could reevaluate him 

tomorrow.  No other complaints.





3/4/2019 patient is comfortably in the bed.  No acute events in the last 24 

hours.  Patient is afebrile.  T-max is 97.7.  WBC went up to 15,700 today.  Plan

to recheck CBC tomorrow.





T-max is 98.3.  Patient is going to stay until Thursday and will be transferred 

to [facility] from there he will go to [facility].





[id number]eq[age]-old male admitted with altered mental status  secondary to UTI 

urine culture is positive for ESBL E. coli.  I spoke to the son about change in 

mental status since yesterday and also notified him that CT scan was done 

yesterday indicating bilateral pneumonia most likely aspiration pneumonia.  

Patient is supposed to go to [facility] tomorrow for catheter stent removal 

and based on the patient's present condition we may have to cancel the transfer 

tomorrow.  I spoke to the son about possibility of a feeding tube if the speech 

therapy he is at high risk for aspiration.  This morning patient is in the bed 

not responding to verbal commands only response is opening  eyes and moaning 

with  chest rub.  Explained to the son that overall prognosis is poor but as per

the son patient wishes  are full code.


Reason For Visit: 


RENAL STONE








Physical Exam


Vital Signs: 


                                        











Temp Pulse Resp BP Pulse Ox


 


 98.1 F   83   20   116/63   98 


 


 03/05/19 23:58  03/06/19 02:17  03/06/19 02:17  03/05/19 23:58  03/06/19 02:17








                                 Intake & Output











 03/05/19 03/06/19 03/07/19





 06:59 06:59 06:59


 


Intake Total 1900 6037 


 


Output Total 4100 27764 


 


Balance -2200 -5714 


 


Weight 69.5 kg 66.4 kg 











General appearance: PRESENT: thin


Head exam: PRESENT: atraumatic


Eye exam: PRESENT: PERRLA


Mouth exam: PRESENT: moist, tongue midline


Teeth exam: PRESENT: poor dentation


Neck exam: ABSENT: carotid bruit, JVD, lymphadenopathy, thyromegaly


Respiratory exam: PRESENT: decreased breath sounds, other - Transmitted breath 

sounds.


Cardiovascular exam: PRESENT: tachycardia


GI/Abdominal exam: PRESENT: normal bowel sounds, soft.  ABSENT: distended, 

guarding, mass, organolmegaly, rebound, tenderness


Gentrourinary exam: PRESENT: indwelling catheter - She has indwelling Salazar's 

catheter urine looks clear.  She is receiving continuous bladder irrigation.


Neurological exam: PRESENT: altered





Results


Laboratory Results: 


                                        





                                 03/06/19 05:00 





                                        











  03/06/19 03/06/19 03/06/19





  05:00 05:00 07:35


 


WBC  Cancelled  


 


RBC  Cancelled  


 


Hgb  Cancelled  


 


Hct  Cancelled  


 


MCV  Cancelled  


 


MCH  Cancelled  


 


MCHC  Cancelled  


 


RDW  Cancelled  


 


Plt Count  Cancelled  


 


Seg Neutrophils %  Cancelled   Not Reportable


 


Lymphocytes %  Cancelled   Not Reportable


 


Monocytes %  Cancelled   Not Reportable


 


Eosinophils %  Cancelled   Not Reportable


 


Basophils %  Cancelled   Not Reportable


 


Absolute Neutrophils  Cancelled   Not Reportable


 


Absolute Lymphocytes  Cancelled   Not Reportable


 


Absolute Monocytes  Cancelled   Not Reportable


 


Absolute Eosinophils  Cancelled   Not Reportable


 


Absolute Basophils  Cancelled   Not Reportable


 


Sodium   151.7 H 


 


Potassium   3.4 L 


 


Chloride   123 H 


 


Carbon Dioxide   22 


 


Anion Gap   7 


 


BUN   23 H 


 


Creatinine   0.76 


 


Est GFR ( Amer)   > 60 


 


Est GFR (Non-Af Amer)   > 60 


 


Glucose   106 


 


Calcium   8.7 


 


Magnesium   2.0 


 


Total Bilirubin   1.2 


 


AST   21 


 


ALT   14 L 


 


Alkaline Phosphatase   65 


 


Total Protein   5.3 L 


 


Albumin   2.4 L 








                                        











  02/23/19 02/23/19 02/23/19





  12:00 18:19 18:19


 


Creatine Kinase   23 L 


 


Troponin I  0.021   0.021














  02/24/19 02/24/19 02/24/19





  00:33 00:33 08:13


 


Creatine Kinase  35 L   24 L


 


Troponin I   0.022 














  02/24/19





  08:13


 


Creatine Kinase 


 


Troponin I  < 0.012











Impressions: 


                                        





Abdomen/Pelvis CT  02/23/19 00:00


IMPRESSION:


1. Unchanged nonobstructing 7 mm stone within the mid left ureter.  New 4 mm 

stone within the left renal pelvis.  Unchanged 1.2 cm stone within the superior 

calyx of the left kidney.  No hydronephrosis.


2. Unchanged positioning of the left ureteral stent.


3. No additional changes when compared to 2/22/2019.


 








Head CT  02/23/19 11:35


IMPRESSION:  CHRONIC CHANGES OF ATROPHY AND MICROVASCULAR ISCHEMIA.  NO ACUTE 

PROCESS.


EVIDENCE OF ACUTE STROKE: NO.


 








Chest X-Ray  03/03/19 15:15


IMPRESSION:  NO ACUTE RADIOGRAPHIC FINDING IN THE CHEST.


 








Chest CT  03/05/19 00:00


IMPRESSION:  Bibasilar pneumonia, aspiration should be considered


 








Guidance Fluoroscopy  03/05/19 00:00


IMPRESSION:  SUCCESSFUL PLACEMENT OF A 5 FR DUAL LUMEN 41 CM PICC IN THE RIGHT 

BASILIC VEIN.


 








Interventional Vascular Procedure  03/05/19 00:00


IMPRESSION:  SUCCESSFUL PLACEMENT OF A 5 FR DUAL LUMEN 41 CM PICC IN THE RIGHT 

BASILIC VEIN.


 








PICC Line Insertion  03/05/19 00:00


IMPRESSION:  SUCCESSFUL PLACEMENT OF A 5 FR DUAL LUMEN 41 CM PICC IN THE RIGHT 

BASILIC VEIN.


 














Assessment & Plan





- Diagnosis


(1) Altered mental status


Qualifiers: 


   Altered mental status type: unspecified   Qualified Code(s): R41.82 - Altered

mental status, unspecified   


Is this a current diagnosis for this admission?: Yes   


Plan: 


2/23/2019 patient is going to be admitted for altered mental status most likely 

secondary to sepsis.  Is going to be placed in IMCU.  Started on IV fluids 

normal saline at 100 cc/h, to repeat the lactic acid levels, started on IV Zosyn

and IV vancomycin.  We are going to adjust the doses as per pharmacy 

recommendations.  Blood cultures urine culture was sent.  Aspiration fall 

precautions are requested.  Neurochecks are requested for every 4 hours for the 

next 24 hours.  Swallowing evaluation will be done today.  CT head was negative 

for acute changes.  To start on IV Ativan 1 mg every 8 hours as needed for 

agitation.





2/24/2019-altered mental status/acute encephalopathy most likely secondary to 

sepsis.  Presently he is on IV Zosyn and vancomycin.  Urine cultures blood 

cultures are pending.  CT head was negative.  He failed swallowing evaluation.  

We are going to request for PT OT consult and discharge planner.  Requested for 

neuro checks.  Aspiration fall  Seizure precautions are requested.  Overall 

prognosis poor condition is critical.





2/25/2019 altered mental status/acute encephalopathy most likely secondary to 

sepsis.  Blood cultures are negative so far.  Patient is presently on IV Zosyn 

and vancomycin.  It is much more alert and oriented.  Be going to try to give 

his morning p.o. medications.  Therapy is working with the patient.  Aspiration 

fall seizure precautions are requested.





2/26/2019-altered mental status/acute encephalopathy most likely secondary to 

sepsis is resolved.  Patient said he has a difficulty in swallowing this morning

we going to reevaluate by placing a speech consult for tomorrow.  Started on 

pured diet from today.





3/4/2019-altered mental status/acute encephalopathy most likely secondary to 

sepsis which was resolved.  He is back at the baseline.





3/5/2019-patient was admitted with altered mental status/acute encephalopathy.  

Which is most likely secondary to urosepsis.  Patient mental status is back at 

baseline.





3/6/2019-patient was admitted with altered mental status/acute encephalopathy 

most likely secondary to UTI urine culture showing ESBL E. coli . On IV Zosyn.  

Patient mental status improved for couple of days now it is deteriorated and I 

am going to arrange for a CT head without contrast today to rule out any stroke.

 Patient is responding only to chest rub.  No focal neurological deficits 

noticed.








(2) Sepsis


Qualifiers: 


   Sepsis type: Escherichia coli   Qualified Code(s): A41.51 - Sepsis due to 

Escherichia coli [E. coli]   


Is this a current diagnosis for this admission?: Yes   


Plan: 


2/23/2019 patient is in septic stock when he came in blood pressure was 78/50 

with IV fluids latest blood pressure is 124/60 initially on presentation is 

tachycardic heart rate is 106 tachypneic respiratory of 24, lactic acid level is

3.1 with altered mental status.  And is to continue IV fluids normal saline at 

100 cc/h started on IV vancomycin and Zosyn, repeat the lactic acid level this 

evening daily labs.  Blood cultures urine cultures are pending.  Chest x-ray 

does not show any pneumonia.  Urinalysis is cloudy with leukocyte esterase 

positive, WBC more than 5 RBCs 182.  Most likely cause of sepsis is urinary 

tract infection.





2/24/2019-patient's respiratory rate is 31, blood pressure is 124/79 improved.  

Latest lactic acid is 0.2.  Urine cultures blood cultures are pending.  Patient 

is in altered mental status.  In my opinion patient is in septic shock.  Patient

is full code.  Plan is to continue IV fluids, IV Zosyn and IV vancomycin.





2/25/2019-patient's T-max is 98.4 pulse rate is 93.  Blood pressure today is 

138/71.  Plan is to continue the IV antibiotic therapy and to decrease the IV 

infusion of the fluids.  Cultures are pending.  Presently on IV Zosyn and 

vancomycin plan is to continue those antibiotics.





2/26/2019 patient was admitted with septic shock blood cultures are negative 

urine culture shows ESBL coli.  Blood pressure today 130/63 temperature 97.3 

pulse rate 62, WBC count is 8300.  Septic shock is resolved.





3/4/2019 patient is admitted with altered mental status blood cultures are 

negative urine culture is positive for ESBL E. coli.  Presently on IV Zosyn.





3/5/2019-patient is admitted with altered mental status blood cultures are 

negative urine culture is positive for ESBL E. coli the urologist told me 

patient has at least 2 UTIs after stent placement and this is third episode of 

UTI he prefers to continue IV antibiotic therapy until the ureteral stent was 

removed.





3/6/2019-patient vital signs today blood pressure is 116/63 temperature is 98.1 

pulse rate is 88 pulse ox is 100% on 4 L.  ET chest done yesterday shows 

bilateral pneumonia suggesting aspiration pneumonia.  Urine culture is positive 

for ESBL E. coli.  Today's labs indicate WBC is 1.4 with hemoglobin of 9.5








(3) Leukocytosis


Qualifiers: 


   Leukocytosis type: unspecified   Qualified Code(s): D72.829 - Elevated white 

blood cell count, unspecified   


Is this a current diagnosis for this admission?: Yes   


Plan: 


2/23/2019 admission WBC count is 10,000 most likely secondary to sepsis.





2/24/2019-yesterday's WBC count is 18,000 today's labs are pending leukocytosis 

most likely secondary to sepsis.





2/25/2019-today's labs are pending.  Admission WBC is 18,000 yesterday WBC came 

down to 14,500.  Plan is to continue the present management.  Leukocytosis is 

resolving.





2/26/2019-on admission WBC count is 18,100 today WBC count is 8300 leukocytosis 

secondary to sepsis is resolved.





3/4/2019-WBC count today 15,700.  Patient is on prednisone 10 mg .  Plan is to 

discontinue IV antibiotics today because patient received already 8 days of 

antibiotic therapy.


pt is afebrile for the last 1 week.





3/5/2019-WBC count today is 14,700 improved from yesterday.  Patient is 

afebrile.  Patient is on prednisone 10 mg daily for rheumatoid arthritis.  Plan 

is to continue the present management.





3/6/2019-patient's WBC count is 1.4 today significant drop compared to yesterd

ay.  I am going to request a hematology consult.  We going to repeat the CBC 

today.  Planning to put a neutropenic precautions.








(4) COPD (chronic obstructive pulmonary disease)


Is this a current diagnosis for this admission?: No   


Plan: 


2/23/2019 patient has history of COPD not on home oxygen he is ex-smoker quit 

smoking 15 years ago.  He uses Brio and albuterol inhaler at home started him on

Xopenex nebulizations every 6 hours as needed.





2/24/2019-patient has history of COPD he is on nebulizer treatments.  He was 

pulse ox are in the 90s.





2/25/2019 patient has history of COPD his pulse ox is 92% on room air. he Is 

getting Xopenex nebulizer treatments.  Plan is to continue the present 

management.





2/26/2019-patient has history of COPD not on home oxygen.  Pulse ox is 82% on 

room air this morning.  He is on Xopenex nebulizations.  Chest x-ray negative 

for pneumonia.  Plan is to continue the present management.





3/4/2019-patient has history of COPD yesterday's pulse ox is noted to drop to 

85% chest x-ray was done which was negative.  Today patient is on 4 L of oxygen 

pulse ox is 98% I decreased the oxygen requirements to 2 L to see and check the 

oxygen saturation.





3/5/2019-patient has history of COPD not on home oxygen.  Pulse ox is 96% on 1-

1/2 L oxygen.  Chest x-ray negative for pneumonia.  He managed to go to a 

nursing home with  oxygen via nasal cannula.  On examination chest bilateral 

entry was decreased no wheezing no crepitations or heard.





3/6/2019-patient has history of COPD he is getting Xopenex nebulizations CT 

chest done yesterday indicating bilateral pneumonia with possible aspiration.  

He is on n.p.o. speech consult was requested.  Pulse oxes 100% on 4 L.  Plan is 

to continue Xopenex nebulizations oxygen 4 L via nasal cannula.  On examination 

chest bilateral entry was decreased, bilateral transmitted breath sounds are 

present.








(5) Coronary artery disease


Is this a current diagnosis for this admission?: No   


Plan: 


2/23/2019-patient has history of coronary artery disease status post stent 

placement multiple times.  Plan to do the serial cardiac enzymes and troponins. 

Initial troponin is less than 0.021.  Patient is on aspirin Plavix and 

simvastatin at home those medications are going to be resumed.





2/24/2019-patient has history of coronary artery disease previous history of 

multiple stents placement.  At home he is on aspirin, Plavix, simvastatin.  We 

resumed his home medications.  But patient is unable to swallow failed 

swallowing evaluation because of  altered mental status.





2/25/2019-patient has history of coronary artery disease with multiple stent 

placement.  At home he is on aspirin, Plavix, simvastatin plan is to continue 

with his home medications.  Patient failed his swallowing evaluation yesterday 

is much more alert and oriented today hopefully he able to take his oral 

medications.





2/26/2019-patient has history of coronary artery disease status post stent 

placement multiple times.  Patient is on aspirin, Plavix, and simvastatin.  

Resumed those   medications during the hospital stay.





3/4/2019-patient has history of coronary artery disease status post stent 

placement presently is on aspirin and Plavix and simvastatin plan is to continue

the present management.





3/5/2019-patient has history of coronary artery disease status post stent 

placement multiple times.  Presently on aspirin on Plavix, simvastatin.  Plan is

to discontinue aspirin and Plavix in preparation for ureteral stent removed on 

Thursday.





3/6/2019-patient has history of coronary artery disease status post stent bairon

cement multiple times.  He is on aspirin and Plavix dose on hold because of the 

planned ureteral stent removal tomorrow.  Unfortunately patient condition is 

deteriorated and is unable to take any medication by mouth probably he may not 

go to the procedure tomorrow.  I spoke to the son he thinks dad wishes are full 

code.








(6) Rheumatoid arthritis


Is this a current diagnosis for this admission?: No   


Plan: 


2/23/2019 patient has history of rheumatoid arthritis on methotrexate and 

prednisone at home those medications are restarted again.





2/24/2019-patient has history of rheumatoid arthritis on methotrexate and p.o. 

prednisone at home.  Those medications are resumed but the patient is unable to 

swallow the pills at this moment.





2/25/2019-patient has history of rheumatoid arthritis on methotrexate and 

prednisone at home, meds will be restarted once able to swallow the medications.





2/26/2019-patient has history of rheumatoid arthritis on methotrexate and 

prednisone these medications are restarted during the hospital stay.





3/4/2019-patient has history of rheumatoid arthritis on methotrexate and 

prednisone at home these medications are continued during the hospital stay.





3/5/2019-patient has history of rheumatoid arthritis on methotrexate and p.o. 

prednisone.  These home medications are resumed during the hospital stay.





3/6/2019-patient has history of rheumatoid arthritis he is on methotrexate and 

prednisone those medications are on hold.  Because of the high risk of 

aspiration is n.p.o. right now.








(7) Paroxysmal atrial fibrillation


Is this a current diagnosis for this admission?: No   


Plan: 


2/23/2019-patient has history of paroxysmal atrial fibrillation on warfarin 1 mg

p.o. daily, Plavix 75 mg p.o. daily, aspirin 81 mg p.o. daily these medications 

he is taking at home plan is to resume those medications in the hospital.





2/24/2019-patient is giving the history of his dad having paroxysmal atrial 

fibrillation, patient in sinus rhythm here.  We resumed his warfarin yesterday 

but is unable to take any oral medications at this point.





2/25/2019-patient is has a history of paroxysmal atrial fibrillation he is in 

sinus rhythm he is on warfarin at home.  We are going to check his PT/INR on 

daily basis.





2/26/2018-patient has history of paroxysmal atrial fibrillation, during his 

hospital stay he is on sinus rhythm.  According to his son warfarin was 

discontinued recently.  INR today is 1.71.  As per the son's request we going to

discontinue warfarin.  Be placed on a Lovenox 40 mg subcu daily.





3/4/2019-patient has history of paroxysmal atrial fibrillation presently is in 

sinus rhythm as per the son patient was on warfarin before and it was 

discontinued.  Getting Lovenox for DVT prophylaxis.  The catheter bag looks 

clean urine.  Looks like hematuria is resolving.





3/5/2019-patient has history of paroxysmal atrial fibrillation he is on warfarin

before, as per the son's request pt  is off warfarin now.  Presently on aspirin 

and Plavix and Lovenox those are discontinued in preparation for PICC line today

and also in preparation  for ureteral stent  removal on Thursday at [facility].





3/6/2019-patient has history of paroxysmal atrial fibrillation he is on Coumadin

for a while which was discontinued recently.  Initially he is on aspirin Plavix 

and Lovenox here in this hospital presently is on Lovenox only he is unable to 

take any medications by mouth right now.








(8) History of AAA (abdominal aortic aneurysm) repair


Is this a current diagnosis for this admission?: No   





(9) BPH (benign prostatic hyperplasia)


Is this a current diagnosis for this admission?: No   


Plan: 


2/23/2019 patient has history of BPH on finasteride and Flomax at home those 

medications are restarted during this hospital stay.





2/24/2019 patient has history of BPH on finasteride and Flomax at home those 

medications are resumed but patient is unable to swallow any medications at this

moment patient has a Salazar's catheter draining blood stained urine.  Overall 

prognosis and condition again critical.





2/25/2019 patient has history of BPH and he is on finasteride and Flomax at home

plan is to resume those medications.





2/26/2019-patient history of BPH he is on finasteride and Flomax at home those 

medications are restarted during this hospital stay.





3/4/2019 patient has history of BPH on finasteride and Flomax those medications 

are continued during this hospital stay.





3/5/2019-patient has history of benign prostatic hypertrophy on finasteride and 

Flomax at home those medications are continued during this hospital stay.  Trinity arevalo has a Salazar's catheter with continuous bladder irrigation and urine is clear 

without any hematuria now.





3/6/2019 patient has history of BPH with Salazar's catheter on finasteride and 

Flomax at home patient is a high risk for aspiration is n.p.o. until further 

recommendations are available from the speech therapist.








(10) UTI (urinary tract infection)


Qualifiers: 


   Urinary tract infection type: acute cystitis   Hematuria presence: with 

hematuria   Qualified Code(s): N30.01 - Acute cystitis with hematuria   


Is this a current diagnosis for this admission?: Yes   


Plan: 


2/25/2019-patient has a urinary stent placement at [facility].  He came in 

with septic shock.  Most likely source of infection in the urine.  Urine 

analysis initially shows cloudy urine with increased WBC and RBC.  Urine culture

is pending.  Presently on IV vancomycin and Zosyn plan is to continue those 

medications.





2/26/2019-patient has recent history of a urinary stent placement.  Followed by 

at least 2 urinary tract infections.  He was admitted here for sepsis.  Urine cu

lture is positive for ESBL E. coli and on Zosyn.  Patient is afebrile.  Plan is 

to continue the present management.





3/4/2019-urine culture shows ESBL E. coli on Zosyn.  I spoke to the pharmacy 

about continuation of the antibiotics we both agree that patient received 8 days

of antibiotics so far plan is to discontinue the antibiotics based on the fact 

that patient is afebrile for more than a week.





3/5/2019 urine culture is positive for ESBL E. coli on IV Zosyn I spoke to 

urologist yesterday according to him patient already has at least 2 episodes of 

UTI requiring antibiotics after ureteral stent placement this is the third 

episode of UTI, he preferred to keep the patient on IV antibiotic therapy until 

the ureteral stent was removed.  So the plan is to continue IV Zosyn.





3/6/2019 urine culture shows ESBL E. coli and this is the third urinary tract 

infection after the stent placement.  On IV Zosyn.  Started on IV vancomycin 

because of the bilateral pneumonia on the CT suggestive of aspiration pneumonia.








(11) Aspiration pneumonia


Is this a current diagnosis for this admission?: Yes   


Plan: 


3/6/2019-CT chest was done yesterday shows aspiration pneumonia.  Most likely 

secondary to gram-negative bacteria this is hospital-acquired pneumonia patient 

is already on IV Zosyn and IV vancomycin was started today.








(12) Leukopenia


Is this a current diagnosis for this admission?: Yes   


Plan: 


3/6/2019-patient WBC count today is 1.4 leukopenia most likely secondary to 

underlying sepsis.  Reverse isolation was requested.  Consultation with Dr. Carpio was requested.








- Time


Time Spent with patient: 15-24 minutes


Medications reviewed and adjusted accordingly: Yes


Anticipated discharge: SN[facility]

## 2019-03-07 LAB
ABSOLUTE LYMPHOCYTES# (MANUAL): 0.3 10^3/UL (ref 0.5–4.7)
ABSOLUTE MONOCYTES # (MANUAL): 0.4 10^3/UL (ref 0.1–1.4)
ABSOLUTE NEUTROPHILS# (MANUAL): 0.6 10^3/UL (ref 1.7–8.2)
ADD MANUAL DIFF: YES
ALBUMIN SERPL-MCNC: 2.4 G/DL (ref 3.5–5)
ALP SERPL-CCNC: 78 U/L (ref 38–126)
ALT SERPL-CCNC: 27 U/L (ref 21–72)
ANION GAP SERPL CALC-SCNC: 6 MMOL/L (ref 5–19)
ANISOCYTOSIS BLD QL SMEAR: (no result)
APTT BLD: 57 SEC (ref 23.5–35.8)
AST SERPL-CCNC: 17 U/L (ref 17–59)
BASOPHILS NFR BLD MANUAL: 0 % (ref 0–2)
BILIRUB DIRECT SERPL-MCNC: 0.5 MG/DL (ref 0–0.4)
BILIRUB SERPL-MCNC: 1.1 MG/DL (ref 0.2–1.3)
BUN SERPL-MCNC: 19 MG/DL (ref 7–20)
CALCIUM: 9.1 MG/DL (ref 8.4–10.2)
CHLORIDE SERPL-SCNC: 123 MMOL/L (ref 98–107)
CO2 SERPL-SCNC: 25 MMOL/L (ref 22–30)
EOSINOPHIL NFR BLD MANUAL: 2 % (ref 0–6)
ERYTHROCYTE [DISTWIDTH] IN BLOOD BY AUTOMATED COUNT: 20.8 % (ref 11.5–14)
FIBRINOGEN PPP-MCNC: 560 MG/DL (ref 209–497)
GLUCOSE SERPL-MCNC: 125 MG/DL (ref 75–110)
HCT VFR BLD CALC: 29.8 % (ref 37.9–51)
HGB BLD-MCNC: 9.6 G/DL (ref 13.5–17)
INR PPP: 1.73
MCH RBC QN AUTO: 30.9 PG (ref 27–33.4)
MCHC RBC AUTO-ENTMCNC: 32 G/DL (ref 32–36)
MCV RBC AUTO: 97 FL (ref 80–97)
MONOCYTES % (MANUAL): 28 % (ref 3–13)
NEUTS BAND NFR BLD MANUAL: 10 % (ref 3–5)
PLATELET # BLD: 119 10^3/UL (ref 150–450)
PLATELET COMMENT: (no result)
POTASSIUM SERPL-SCNC: 3.3 MMOL/L (ref 3.6–5)
PROT SERPL-MCNC: 5.6 G/DL (ref 6.3–8.2)
PROTHROMBIN TIME: 21.1 SEC (ref 11.4–15.4)
RBC # BLD AUTO: 3.09 10^6/UL (ref 4.35–5.55)
SEGMENTED NEUTROPHILS % (MAN): 36 % (ref 42–78)
SODIUM SERPL-SCNC: 154.4 MMOL/L (ref 137–145)
TOTAL CELLS COUNTED BLD: 50
TOXIC GRANULES BLD QL SMEAR: (no result)
VARIANT LYMPHS NFR BLD MANUAL: 24 % (ref 13–45)
WBC # BLD AUTO: 1.3 10^3/UL (ref 4–10.5)

## 2019-03-07 RX ADMIN — Medication SCH UNIT: at 14:10

## 2019-03-07 RX ADMIN — ENOXAPARIN SODIUM SCH: 30 INJECTION SUBCUTANEOUS at 09:46

## 2019-03-07 RX ADMIN — MORPHINE SULFATE PRN MG: 10 INJECTION INTRAMUSCULAR; INTRAVENOUS; SUBCUTANEOUS at 19:01

## 2019-03-07 RX ADMIN — Medication PRN UNIT: at 05:03

## 2019-03-07 RX ADMIN — MORPHINE SULFATE PRN MG: 10 INJECTION INTRAMUSCULAR; INTRAVENOUS; SUBCUTANEOUS at 02:56

## 2019-03-07 RX ADMIN — VANCOMYCIN HYDROCHLORIDE SCH MLS/HR: 1 INJECTION, POWDER, LYOPHILIZED, FOR SOLUTION INTRAVENOUS at 09:38

## 2019-03-07 RX ADMIN — MEROPENEM SCH MLS/HR: 1 INJECTION INTRAVENOUS at 05:03

## 2019-03-07 RX ADMIN — MORPHINE SULFATE PRN MG: 10 INJECTION INTRAMUSCULAR; INTRAVENOUS; SUBCUTANEOUS at 16:04

## 2019-03-07 RX ADMIN — SODIUM CHLORIDE SCH ML: 9 INJECTION INTRAMUSCULAR; INTRAVENOUS; SUBCUTANEOUS at 21:30

## 2019-03-07 RX ADMIN — VANCOMYCIN HYDROCHLORIDE SCH MLS/HR: 1 INJECTION, POWDER, LYOPHILIZED, FOR SOLUTION INTRAVENOUS at 21:29

## 2019-03-07 RX ADMIN — MEROPENEM SCH MLS/HR: 1 INJECTION INTRAVENOUS at 14:09

## 2019-03-07 RX ADMIN — MEROPENEM SCH MLS/HR: 1 INJECTION INTRAVENOUS at 21:28

## 2019-03-07 RX ADMIN — LORAZEPAM PRN MG: 2 INJECTION INTRAMUSCULAR; INTRAVENOUS at 02:33

## 2019-03-07 RX ADMIN — SODIUM CHLORIDE PRN MLS/HR: 0.45 INJECTION, SOLUTION INTRAVENOUS at 14:00

## 2019-03-07 RX ADMIN — SODIUM CHLORIDE SCH ML: 9 INJECTION INTRAMUSCULAR; INTRAVENOUS; SUBCUTANEOUS at 09:38

## 2019-03-07 RX ADMIN — Medication SCH UNIT: at 21:28

## 2019-03-07 NOTE — PDOC CONSULTATION
Consultation


Consult Date: 03/07/19


Consult reason:: Hematology/Oncology consultation was requested for patient with

sudden pancytopenia.





History of Present Illness


Admission Date/PCP: 


  02/23/19 15:46





  KATELIN PICHARDO DO





History of Present Illness: 


GEOVANNI ISRAEL is a 73 year old male who was found to have altered mental status 

and was admitted to the hospital.  He was treated for E. coli UTI and renal 

stones.  He improved, but then 2 days ago, again because more confused.  He was 

found to have what appears to be aspiration pneumonia and also developed sudden 

pancytopenia.  In reviewing the patient's chart, he was given his regular dose 

of Methotrexate 10 mg on 3/2/2019.  He was also on Pipperacillin for the 

infection.   His EGFR and LFTs have remained normal.  He has also been given 

some heparin products during this admission.  Currently, he is on CPAP mask and 

shakes his head back and forth, but does not open eyes, make eye contact, or 

attempt any verbalization.  





Past Medical History


Cardiac Medical History: Reports: Atrial Fibrillation, Coronary Artery Disease, 

Myocardial Infarction, Peripheral Vascular Disease


   Denies: Congestive Heart Failure, Hypertension


Pulmonary Medical History: Reports: Bronchitis, Chronic Obstructive Pulmonary 

Disease (COPD), Pneumonia


   Denies: Asthma, Tuberculosis


Neurological Medical History: 


   Denies: Seizures


Renal/ Medical History: 


   Denies: End Stage Renal Disease


GI Medical History: Reports: Gastroesophageal Reflux Disease


   Denies: Cirrhosis


Musculoskeltal Medical History: Reports: Arthritis


Psychiatric Medical History: 


   Denies: Bipolar Disorder, Depression


Hematology: 


   Denies: Anemia, Bleeding Tendencies





Past Surgical History


Past Surgical History: Reports: Cardiac Catheterization, Cholecystectomy, Coron

estela Stent, Orthopedic Surgery - Kyphoplasty-12/2018, Vascular Surgery - 

Abdominal aortic aneurysm and bilateral femoropopliteal graft





Social History


Smoking Status: Former Smoker


Frequency of Alcohol Use: None


Hx Recreational Drug Use: No


Drugs: None


Hx Prescription Drug Abuse: No





- Advance Directive


Resuscitation Status: Full Code





Family History


Family History: CAD, COPD, Hypertension


Parental Family History Reviewed: No


Children Family History Reviewed: No


Sibling(s) Family History Reviewed.: No





Medication/Allergy


Home Medications: 








Aspirin [Adult Low Dose Aspirin EC] 81 mg PO QHS 02/24/19 


Clopidogrel Bisulfate [Plavix 75 mg Tablet] 75 mg PO DAILY 02/24/19 


Finasteride [Proscar 5 mg Tablet] 5 mg PO DAILY 02/24/19 


Fluticasone/Vilanterol [Breo Ellipta 100-25 Mcg INH] 1 each IH DAILY 02/24/19 


Omeprazole 80 mg PO DAILY 02/24/19 


Prednisone [Deltasone 5 mg Tablet] 5 mg PO DAILY 02/24/19 


Simvastatin [Zocor 80 mg Tablet] 80 mg PO QHS 02/24/19 


Sotalol HCl [Sotalol Af] 80 mg PO Q12 02/24/19 


Tamsulosin HCl [Flomax] 0.8 mg PO QHS 02/24/19 


Warfarin Sodium [Coumadin 1 mg Tablet] 1 mg PO DAILY 02/24/19 


Methotrexate Sodium [Rheumatrex 2.5 mg Tablet] 10 mg PO SA@1000,2200 02/25/19 








Allergies/Adverse Reactions: 


                                        





codeine Allergy (Verified 02/22/19 01:25)


   











Review of Systems


ROS unobtainable: Due to mental status


Review of Systems: 





Per nursing, nothing else new of concern.  





Physical Exam


Vital Signs: 


                                        











Temp Pulse Resp BP Pulse Ox


 


 98.4 F   128 H  18   100/64   81 L


 


 03/07/19 03:23  03/07/19 06:10  03/07/19 03:23  03/07/19 06:10  03/07/19 05:36








                                 Intake & Output











 03/06/19 03/07/19 03/08/19





 06:59 06:59 06:59


 


Intake Total 6037 4500 


 


Output Total 39836 4120 


 


Balance -5763 380 


 


Weight 66.4 kg 69.9 kg 











General appearance: PRESENT: well-developed, well-nourished


Head exam: PRESENT: normocephalic


Eye exam: PRESENT: other - Does not open eyes.


Mouth exam: PRESENT: neck supple


Neck exam: ABSENT: lymphadenopathy, tenderness


Respiratory exam: PRESENT: rales, other - course upper airway noise.


Cardiovascular exam: PRESENT: other - Heart sound obscured.


GI/Abdominal exam: PRESENT: soft.  ABSENT: organolmegaly, tenderness


Extremities exam: PRESENT: other - TEDs in place..  ABSENT: pedal edema


Neurological exam: PRESENT: altered, awake


Psychiatric exam: PRESENT: agitated


Skin exam: PRESENT: pallor





Results


Laboratory Results: 


                                        





                                 03/07/19 04:00 





                                 03/07/19 04:00 





                                        











  03/06/19 03/06/19 03/07/19





  07:35 12:40 04:00


 


WBC  1.4 L* D   1.3 L*


 


RBC  3.05 L   3.09 L


 


Hgb  9.5 L   9.6 L


 


Hct  29.3 L   29.8 L


 


MCV  96   97


 


MCH  31.2   30.9


 


MCHC  32.4   32.0


 


RDW  20.8 H   20.8 H


 


Plt Count  141 L   119 L


 


Seg Neutrophils %  Not Reportable   Not Reportable


 


Lymphocytes %  Not Reportable   Not Reportable


 


Monocytes %  Not Reportable   Not Reportable


 


Eosinophils %  Not Reportable   Not Reportable


 


Basophils %  Not Reportable   Not Reportable


 


Absolute Neutrophils  Not Reportable   Not Reportable


 


Absolute Lymphocytes  Not Reportable   Not Reportable


 


Absolute Monocytes  Not Reportable   Not Reportable


 


Absolute Eosinophils  Not Reportable   Not Reportable


 


Absolute Basophils  Not Reportable   Not Reportable


 


Carbonic Acid   1.11 


 


HCO3/H2CO3 Ratio   21:1 


 


ABG pH   7.43 


 


ABG pCO2   37.0 


 


ABG pO2   88.4 


 


ABG HCO3   23.8 


 


ABG O2 Saturation   97.0 


 


ABG Base Excess   -0.3 


 


FiO2   40% 


 


Sodium   


 


Potassium   


 


Chloride   


 


Carbon Dioxide   


 


Anion Gap   


 


BUN   


 


Creatinine   


 


Est GFR ( Amer)   


 


Est GFR (Non-Af Amer)   


 


Glucose   


 


Calcium   


 


Magnesium   


 


Total Bilirubin   


 


AST   


 


ALT   


 


Alkaline Phosphatase   


 


Total Protein   


 


Albumin   














  03/07/19





  04:00


 


WBC 


 


RBC 


 


Hgb 


 


Hct 


 


MCV 


 


MCH 


 


MCHC 


 


RDW 


 


Plt Count 


 


Seg Neutrophils % 


 


Lymphocytes % 


 


Monocytes % 


 


Eosinophils % 


 


Basophils % 


 


Absolute Neutrophils 


 


Absolute Lymphocytes 


 


Absolute Monocytes 


 


Absolute Eosinophils 


 


Absolute Basophils 


 


Carbonic Acid 


 


HCO3/H2CO3 Ratio 


 


ABG pH 


 


ABG pCO2 


 


ABG pO2 


 


ABG HCO3 


 


ABG O2 Saturation 


 


ABG Base Excess 


 


FiO2 


 


Sodium  154.4 H


 


Potassium  3.3 L


 


Chloride  123 H


 


Carbon Dioxide  25


 


Anion Gap  6


 


BUN  19


 


Creatinine  0.74


 


Est GFR ( Amer)  > 60


 


Est GFR (Non-Af Amer)  > 60


 


Glucose  125 H


 


Calcium  9.1


 


Magnesium  2.0


 


Total Bilirubin  1.1


 


AST  17


 


ALT  27


 


Alkaline Phosphatase  78


 


Total Protein  5.6 L


 


Albumin  2.4 L








                                        











  02/23/19 02/23/19 02/23/19





  12:00 18:19 18:19


 


Creatine Kinase   23 L 


 


Troponin I  0.021   0.021














  02/24/19 02/24/19 02/24/19





  00:33 00:33 08:13


 


Creatine Kinase  35 L   24 L


 


Troponin I   0.022 














  02/24/19





  08:13


 


Creatine Kinase 


 


Troponin I  < 0.012











Impressions: 


                                        





Abdomen/Pelvis CT  02/23/19 00:00


IMPRESSION:


1. Unchanged nonobstructing 7 mm stone within the mid left ureter.  New 4 mm 

stone within the left renal pelvis.  Unchanged 1.2 cm stone within the superior 

calyx of the left kidney.  No hydronephrosis.


2. Unchanged positioning of the left ureteral stent.


3. No additional changes when compared to 2/22/2019.


 








Chest X-Ray  03/03/19 15:15


IMPRESSION:  NO ACUTE RADIOGRAPHIC FINDING IN THE CHEST.


 








Chest CT  03/05/19 00:00


IMPRESSION:  Bibasilar pneumonia, aspiration should be considered


 








Guidance Fluoroscopy  03/05/19 00:00


IMPRESSION:  SUCCESSFUL PLACEMENT OF A 5 FR DUAL LUMEN 41 CM PICC IN THE RIGHT 

BASILIC VEIN.


 








Interventional Vascular Procedure  03/05/19 00:00


IMPRESSION:  SUCCESSFUL PLACEMENT OF A 5 FR DUAL LUMEN 41 CM PICC IN THE RIGHT 

BASILIC VEIN.


 








PICC Line Insertion  03/05/19 00:00


IMPRESSION:  SUCCESSFUL PLACEMENT OF A 5 FR DUAL LUMEN 41 CM PICC IN THE RIGHT 

BASILIC VEIN.


 








Head CT  03/06/19 00:00


IMPRESSION:  No acute findings.


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  Diffuse atrophy, stable


EVIDENCE OF ACUTE STROKE: NO.


 











Status: Image reviewed by me





Assessment & Plan





- Diagnosis


(1) Pancytopenia, acquired


Is this a current diagnosis for this admission?: Yes   


Plan: 


This was very sudden after acute illness.  Most likely cause is medication or 

infection.  Both the Methotrexate and the pipperacillin may have contributed to 

this.  Both these medications have now been stopped.  I will continue to follow 

with consider changing other medications if indicated.  


He is currently on neutropenic precautions.  I would continue Merrepenum for 

now.  Would hold all anticoagulation if PLT are <50.  








(2) Altered mental status


Qualifiers: 


   Altered mental status type: unspecified   Qualified Code(s): R41.82 - Altered

mental status, unspecified   


Is this a current diagnosis for this admission?: Yes   


Plan: 


I am unsure what his baseline is.  No family at bedside currently.  This may be 

due to infection or hypoxia.  Agree with CPAP.  His NA is also increasing, which

may also contribute to mental status changes.  Would consider changing to 1/2 NS

for MIV and see if he is able to drink more free water, if safe to do so.  








(3) Rheumatoid arthritis


Is this a current diagnosis for this admission?: No   


Plan: 


No further methotrexate during this admission.  








- Plan Summary


Plan Summary: 





I will continue to follow with you.  Please feel free to call with any concerns.

## 2019-03-07 NOTE — PDOC PROGRESS REPORT
Subjective


Progress Note for:: 03/07/19


Subjective:: 





Barely responsive to verbal stimulus.  He did try to open his eyes on command.  

He seems very restless.  He is currently on BiPAP therapy.


Reason For Visit: 


Altered mental status


Leukopenia


Cystitis with E. coli ESBL








Physical Exam


Vital Signs: 


                                        











Temp Pulse Resp BP Pulse Ox


 


 98.3 F   99   20   118/75   99 


 


 03/07/19 08:37  03/07/19 08:37  03/07/19 08:37  03/07/19 08:37  03/07/19 08:37








                                 Intake & Output











 03/06/19 03/07/19 03/08/19





 06:59 06:59 06:59


 


Intake Total 6037 4500 2200


 


Output Total 16903 4120 900


 


Balance -5763 380 1300


 


Weight 66.4 kg 69.9 kg 











General appearance: PRESENT: mild distress, other - No significant interaction 

during this encounter


Head exam: PRESENT: normocephalic


Ear exam: PRESENT: normal external ear exam


Mouth exam: PRESENT: other - BiPAP mask in place


Respiratory exam: PRESENT: rhonchi - Bilaterally, symmetrical - Breathing is 

somewhat labored.  ABSENT: rales, wheezes


Cardiovascular exam: PRESENT: RRR, +S1, +S2, other - Difficult to auscultate 

with rhonchi


GI/Abdominal exam: PRESENT: normal bowel sounds, soft.  ABSENT: distended, 

tenderness


Rectal exam: PRESENT: deferred


Gentrourinary exam: PRESENT: indwelling catheter - Irrigation Salazar.  Still with

specks of blood and very light pink color.


Extremities exam: ABSENT: pedal edema


Neurological exam: PRESENT: altered.  ABSENT: alert, awake


Psychiatric exam: ABSENT: agitated, anxious





Results


Laboratory Results: 


                                        





                                 03/07/19 04:00 





                                 03/07/19 04:00 





                                        











  03/07/19 03/07/19





  04:00 04:00


 


WBC  1.3 L* 


 


RBC  3.09 L 


 


Hgb  9.6 L 


 


Hct  29.8 L 


 


MCV  97 


 


MCH  30.9 


 


MCHC  32.0 


 


RDW  20.8 H 


 


Plt Count  119 L 


 


Seg Neutrophils %  Not Reportable 


 


Lymphocytes %  Not Reportable 


 


Monocytes %  Not Reportable 


 


Eosinophils %  Not Reportable 


 


Basophils %  Not Reportable 


 


Absolute Neutrophils  Not Reportable 


 


Absolute Lymphocytes  Not Reportable 


 


Absolute Monocytes  Not Reportable 


 


Absolute Eosinophils  Not Reportable 


 


Absolute Basophils  Not Reportable 


 


Sodium   154.4 H


 


Potassium   3.3 L


 


Chloride   123 H


 


Carbon Dioxide   25


 


Anion Gap   6


 


BUN   19


 


Creatinine   0.74


 


Est GFR ( Amer)   > 60


 


Est GFR (Non-Af Amer)   > 60


 


Glucose   125 H


 


Calcium   9.1


 


Magnesium   2.0


 


Total Bilirubin   1.1


 


AST   17


 


ALT   27


 


Alkaline Phosphatase   78


 


Total Protein   5.6 L


 


Albumin   2.4 L








                                        











  02/23/19 02/23/19 02/23/19





  12:00 18:19 18:19


 


Creatine Kinase   23 L 


 


Troponin I  0.021   0.021














  02/24/19 02/24/19 02/24/19





  00:33 00:33 08:13


 


Creatine Kinase  35 L   24 L


 


Troponin I   0.022 














  02/24/19





  08:13


 


Creatine Kinase 


 


Troponin I  < 0.012











Impressions: 


                                        





Abdomen/Pelvis CT  02/23/19 00:00


IMPRESSION:


1. Unchanged nonobstructing 7 mm stone within the mid left ureter.  New 4 mm 

stone within the left renal pelvis.  Unchanged 1.2 cm stone within the superior 

calyx of the left kidney.  No hydronephrosis.


2. Unchanged positioning of the left ureteral stent.


3. No additional changes when compared to 2/22/2019.


 








Chest X-Ray  03/03/19 15:15


IMPRESSION:  NO ACUTE RADIOGRAPHIC FINDING IN THE CHEST.


 








Chest CT  03/05/19 00:00


IMPRESSION:  Bibasilar pneumonia, aspiration should be considered


 








Guidance Fluoroscopy  03/05/19 00:00


IMPRESSION:  SUCCESSFUL PLACEMENT OF A 5 FR DUAL LUMEN 41 CM PICC IN THE RIGHT 

BASILIC VEIN.


 








Interventional Vascular Procedure  03/05/19 00:00


IMPRESSION:  SUCCESSFUL PLACEMENT OF A 5 FR DUAL LUMEN 41 CM PICC IN THE RIGHT 

BASILIC VEIN.


 








PICC Line Insertion  03/05/19 00:00


IMPRESSION:  SUCCESSFUL PLACEMENT OF A 5 FR DUAL LUMEN 41 CM PICC IN THE RIGHT 

BASILIC VEIN.


 








Head CT  03/06/19 00:00


IMPRESSION:  No acute findings.


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  Diffuse atrophy, stable


EVIDENCE OF ACUTE STROKE: NO.


 














Assessment & Plan





- Diagnosis


(1) Altered mental status


Qualifiers: 


   Altered mental status type: unspecified   Qualified Code(s): R41.82 - Altered

mental status, unspecified   


Is this a current diagnosis for this admission?: Yes   


Plan: 


Most likely due to infection.  The patient did have extended spectrum 

beta-lactamase E. coli.  His antibiotic therapy has been narrowed to meropenem. 

He is currently on BiPAP.  Difficult to assess his current status as he is 

somnolent.  He is mildly hyponatremic and his fluids will be changed to one half

normal saline.








(2) Aspiration pneumonia


Is this a current diagnosis for this admission?: Yes   


Plan: 


Continue meropenem.  This will cover aspiration pneumonia.  Repeat chest x-ray 

tomorrow.








(3) Hematuria


Qualifiers: 


   Hematuria type: gross   Qualified Code(s): R31.0 - Gross hematuria   


Is this a current diagnosis for this admission?: Yes   


Plan: 


Continue Salazar irrigation








(4) Leukopenia


Qualifiers: 


   Leukopenia type: neutropenia   Neutropenia type: unspecified   Qualified 

Code(s): D70.9 - Neutropenia, unspecified   


Is this a current diagnosis for this admission?: Yes   


Plan: 


Please also see Dr. López's note.  It is thought that the leukopenia could be 

related to infection or possibly medications.








(5) Rheumatoid arthritis


Is this a current diagnosis for this admission?: Yes   


Plan: 


Hold methotrexate








(6) Hypokalemia


Is this a current diagnosis for this admission?: Yes   


Plan: 


Potassium chloride IV supplement.  Monitor potassium level and supplement as 

indicated.








(7) Hypernatremia


Is this a current diagnosis for this admission?: Yes   


Plan: 


Change to one half normal saline.  Monitor serum sodium level.








- Time


Time Spent with patient: 25-34 minutes


Medications reviewed and adjusted accordingly: Yes

## 2019-03-08 LAB
ANION GAP SERPL CALC-SCNC: 5 MMOL/L (ref 5–19)
BUN SERPL-MCNC: 13 MG/DL (ref 7–20)
CALCIUM: 9.3 MG/DL (ref 8.4–10.2)
CHLORIDE SERPL-SCNC: 125 MMOL/L (ref 98–107)
CO2 SERPL-SCNC: 23 MMOL/L (ref 22–30)
ERYTHROCYTE [DISTWIDTH] IN BLOOD BY AUTOMATED COUNT: 21 % (ref 11.5–14)
ERYTHROCYTE [DISTWIDTH] IN BLOOD BY AUTOMATED COUNT: 21.6 % (ref 11.5–14)
GLUCOSE SERPL-MCNC: 116 MG/DL (ref 75–110)
HCT VFR BLD CALC: 26.7 % (ref 37.9–51)
HCT VFR BLD CALC: 27.8 % (ref 37.9–51)
HGB BLD-MCNC: 8.8 G/DL (ref 13.5–17)
HGB BLD-MCNC: 9 G/DL (ref 13.5–17)
MCH RBC QN AUTO: 31.3 PG (ref 27–33.4)
MCH RBC QN AUTO: 31.7 PG (ref 27–33.4)
MCHC RBC AUTO-ENTMCNC: 32.4 G/DL (ref 32–36)
MCHC RBC AUTO-ENTMCNC: 32.9 G/DL (ref 32–36)
MCV RBC AUTO: 96 FL (ref 80–97)
MCV RBC AUTO: 97 FL (ref 80–97)
PLATELET # BLD: 101 10^3/UL (ref 150–450)
PLATELET # BLD: 91 10^3/UL (ref 150–450)
POTASSIUM SERPL-SCNC: 3.4 MMOL/L (ref 3.6–5)
RBC # BLD AUTO: 2.77 10^6/UL (ref 4.35–5.55)
RBC # BLD AUTO: 2.88 10^6/UL (ref 4.35–5.55)
SODIUM SERPL-SCNC: 153.4 MMOL/L (ref 137–145)
WBC # BLD AUTO: 4.5 10^3/UL (ref 4–10.5)
WBC # BLD AUTO: 5 10^3/UL (ref 4–10.5)

## 2019-03-08 RX ADMIN — MORPHINE SULFATE PRN MG: 10 INJECTION INTRAMUSCULAR; INTRAVENOUS; SUBCUTANEOUS at 11:26

## 2019-03-08 RX ADMIN — SODIUM CHLORIDE SCH ML: 9 INJECTION INTRAMUSCULAR; INTRAVENOUS; SUBCUTANEOUS at 21:33

## 2019-03-08 RX ADMIN — MORPHINE SULFATE PRN MG: 10 INJECTION INTRAMUSCULAR; INTRAVENOUS; SUBCUTANEOUS at 09:03

## 2019-03-08 RX ADMIN — MORPHINE SULFATE PRN MG: 10 INJECTION INTRAMUSCULAR; INTRAVENOUS; SUBCUTANEOUS at 13:27

## 2019-03-08 RX ADMIN — MORPHINE SULFATE PRN MG: 10 INJECTION INTRAMUSCULAR; INTRAVENOUS; SUBCUTANEOUS at 17:41

## 2019-03-08 RX ADMIN — DEXTROSE AND SODIUM CHLORIDE PRN MLS/HR: 5; .45 INJECTION, SOLUTION INTRAVENOUS at 15:51

## 2019-03-08 RX ADMIN — LORAZEPAM PRN MG: 2 INJECTION INTRAMUSCULAR; INTRAVENOUS at 13:27

## 2019-03-08 RX ADMIN — ENOXAPARIN SODIUM SCH: 30 INJECTION SUBCUTANEOUS at 09:15

## 2019-03-08 RX ADMIN — SODIUM CHLORIDE PRN MLS/HR: 0.45 INJECTION, SOLUTION INTRAVENOUS at 09:00

## 2019-03-08 RX ADMIN — Medication SCH UNIT: at 09:11

## 2019-03-08 RX ADMIN — LORAZEPAM PRN MG: 2 INJECTION INTRAMUSCULAR; INTRAVENOUS at 15:43

## 2019-03-08 RX ADMIN — Medication SCH UNIT: at 21:33

## 2019-03-08 RX ADMIN — MEROPENEM SCH MLS/HR: 1 INJECTION INTRAVENOUS at 21:32

## 2019-03-08 RX ADMIN — BUDESONIDE SCH MG: 0.5 SUSPENSION RESPIRATORY (INHALATION) at 20:34

## 2019-03-08 RX ADMIN — MEROPENEM SCH MLS/HR: 1 INJECTION INTRAVENOUS at 05:05

## 2019-03-08 RX ADMIN — IPRATROPIUM BROMIDE AND ALBUTEROL SULFATE SCH ML: 2.5; .5 SOLUTION RESPIRATORY (INHALATION) at 14:12

## 2019-03-08 RX ADMIN — LORAZEPAM PRN MG: 2 INJECTION INTRAMUSCULAR; INTRAVENOUS at 09:03

## 2019-03-08 RX ADMIN — MEROPENEM SCH MLS/HR: 1 INJECTION INTRAVENOUS at 14:55

## 2019-03-08 RX ADMIN — LORAZEPAM PRN MG: 2 INJECTION INTRAMUSCULAR; INTRAVENOUS at 17:42

## 2019-03-08 RX ADMIN — MORPHINE SULFATE PRN MG: 10 INJECTION INTRAMUSCULAR; INTRAVENOUS; SUBCUTANEOUS at 15:43

## 2019-03-08 RX ADMIN — LORAZEPAM PRN MG: 2 INJECTION INTRAMUSCULAR; INTRAVENOUS at 11:27

## 2019-03-08 RX ADMIN — DIPHENHYDRAMINE HYDROCHLORIDE PRN MG: 50 INJECTION INTRAMUSCULAR; INTRAVENOUS at 18:40

## 2019-03-08 RX ADMIN — IPRATROPIUM BROMIDE AND ALBUTEROL SULFATE SCH ML: 2.5; .5 SOLUTION RESPIRATORY (INHALATION) at 20:34

## 2019-03-08 RX ADMIN — SODIUM CHLORIDE SCH: 9 INJECTION INTRAMUSCULAR; INTRAVENOUS; SUBCUTANEOUS at 11:30

## 2019-03-08 NOTE — PDOC PROGRESS REPORT
Subjective


Progress Note for:: 03/08/19


Subjective:: 





The patient is still in significant discomfort.  He remains on BiPAP.  He was 

able to nod his head yes and no to answer questions but did not open his eyes.


Reason For Visit: 


RENAL STONE








Physical Exam


Vital Signs: 


                                        











Temp Pulse Resp BP Pulse Ox


 


 99.1 F   99   8 L  127/93 H  94 


 


 03/08/19 07:30  03/08/19 07:30  03/08/19 08:00  03/08/19 07:30  03/08/19 08:00








                                 Intake & Output











 03/07/19 03/08/19 03/09/19





 06:59 06:59 06:59


 


Intake Total 4500 3900 


 


Output Total 4120 3450 


 


Balance 380 450 


 


Weight 69.9 kg 67.3 kg 











General appearance: PRESENT: severe distress - Severe pain, well-developed


Head exam: PRESENT: normocephalic


Ear exam: PRESENT: normal external ear exam


Mouth exam: PRESENT: other - BiPAP mask in place


Respiratory exam: PRESENT: rales - Faint at left base, symmetrical, tachypnea.  

ABSENT: rhonchi, wheezes


Cardiovascular exam: PRESENT: RRR, +S1, +S2


GI/Abdominal exam: PRESENT: normal bowel sounds, soft.  ABSENT: tenderness


Rectal exam: PRESENT: deferred, other - Hemoccult positive today


Gentrourinary exam: PRESENT: indwelling catheter


Extremities exam: ABSENT: pedal edema


Neurological exam: PRESENT: awake - As noted above the patient did not open his 

eyes.  He did respond to questions by nodding yes or no.  He does appear to be 

in significant discomfort which would affect his responses., oriented to person 

- He appears to be oriented at least to person and place., oriented to place.  

ABSENT: alert


Psychiatric exam: PRESENT: agitated - Secondary to pain, appropriate affect - 

Reflects is constant pain





Results


Laboratory Results: 


                                        





                                 03/08/19 04:00 





                                 03/08/19 04:00 





                                        











  03/08/19 03/08/19 03/08/19





  04:00 04:00 04:00


 


WBC   5.0  D 


 


RBC   2.88 L 


 


Hgb   9.0 L 


 


Hct   27.8 L 


 


MCV   97 


 


MCH   31.3 


 


MCHC   32.4 


 


RDW   21.6 H 


 


Plt Count   101 L 


 


Sodium  153.4 H  


 


Potassium  3.4 L  


 


Chloride  125 H  


 


Carbon Dioxide  23  


 


Anion Gap  5  


 


BUN  13  


 


Creatinine  0.70  


 


Est GFR ( Amer)  > 60  


 


Est GFR (Non-Af Amer)  > 60  


 


Glucose  116 H  


 


Calcium  9.3  


 


Magnesium  2.0  


 


Vitamin B12    > 1000.0 H


 


Stool Occult Blood   














  03/08/19





  10:00


 


WBC 


 


RBC 


 


Hgb 


 


Hct 


 


MCV 


 


MCH 


 


MCHC 


 


RDW 


 


Plt Count 


 


Sodium 


 


Potassium 


 


Chloride 


 


Carbon Dioxide 


 


Anion Gap 


 


BUN 


 


Creatinine 


 


Est GFR ( Amer) 


 


Est GFR (Non-Af Amer) 


 


Glucose 


 


Calcium 


 


Magnesium 


 


Vitamin B12 


 


Stool Occult Blood  POSITIVE








                                        











  02/23/19 02/23/19 02/23/19





  12:00 18:19 18:19


 


Creatine Kinase   23 L 


 


Troponin I  0.021   0.021














  02/24/19 02/24/19 02/24/19





  00:33 00:33 08:13


 


Creatine Kinase  35 L   24 L


 


Troponin I   0.022 














  02/24/19





  08:13


 


Creatine Kinase 


 


Troponin I  < 0.012











Impressions: 


                                        





Abdomen/Pelvis CT  02/23/19 00:00


IMPRESSION:


1. Unchanged nonobstructing 7 mm stone within the mid left ureter.  New 4 mm 

stone within the left renal pelvis.  Unchanged 1.2 cm stone within the superior 

calyx of the left kidney.  No hydronephrosis.


2. Unchanged positioning of the left ureteral stent.


3. No additional changes when compared to 2/22/2019.


 








Chest CT  03/05/19 00:00


IMPRESSION:  Bibasilar pneumonia, aspiration should be considered


 








Guidance Fluoroscopy  03/05/19 00:00


IMPRESSION:  SUCCESSFUL PLACEMENT OF A 5 FR DUAL LUMEN 41 CM PICC IN THE RIGHT 

BASILIC VEIN.


 








Interventional Vascular Procedure  03/05/19 00:00


IMPRESSION:  SUCCESSFUL PLACEMENT OF A 5 FR DUAL LUMEN 41 CM PICC IN THE RIGHT 

BASILIC VEIN.


 








PICC Line Insertion  03/05/19 00:00


IMPRESSION:  SUCCESSFUL PLACEMENT OF A 5 FR DUAL LUMEN 41 CM PICC IN THE RIGHT 

BASILIC VEIN.


 








Head CT  03/06/19 00:00


IMPRESSION:  No acute findings.


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  Diffuse atrophy, stable


EVIDENCE OF ACUTE STROKE: NO.


 








Chest X-Ray  03/08/19 06:00


IMPRESSION:  Persistent bilateral lower lobe pneumonia


 














Assessment & Plan





- Diagnosis


(1) Altered mental status


Qualifiers: 


   Altered mental status type: unspecified   Qualified Code(s): R41.82 - Altered

mental status, unspecified   


Is this a current diagnosis for this admission?: Yes   


Plan: 


There are changes on the CT scan of the head consistent with microvascular 

disease and mild cerebral atrophy.  The patient was nodding his head in a 

seemingly appropriate fashion to answer questions.  He clearly is in significant

pain.  As the sepsis is resolved I believe his mental status is improved.  If I 

am able to get his pain under better control we will better be able to assess.








(2) Aspiration pneumonia


Is this a current diagnosis for this admission?: Yes   


Plan: 


Antibiotics modified to meropenem.  Most recent chest x-ray still shows 

bibasilar infiltrates but his lungs sound better.  Continue meropenem.  Zosyn 

and vancomycin were discontinued.  Zosyn mostly with the suspicion of it causing

his leukopenia.  Since the Zosyn has been discontinued his white blood cell 

count has improved.








(3) Occult blood in stools


Is this a current diagnosis for this admission?: Yes   


Plan: 


The patient's hemoglobin is slowly drifting down.  He is still Hemoccult 

positive.  His hemoglobin was 9 today.  I will check it again this evening and 

tomorrow morning.  If he drops below 8.0 I will order blood transfusion.  I have

started a proton pump inhibitor.  He is also off of his antiplatelet therapy.  

We have stopped the venous thrombo-embolus prophylaxis Lovenox.








(4) Hematuria


Qualifiers: 


   Hematuria type: gross   Qualified Code(s): R31.0 - Gross hematuria   


Is this a current diagnosis for this admission?: Yes   


Plan: 


Urine is quite dilute.  There is no obvious blood in the urine.  The nurse 

reports that if she decreases the rate of the continuous bladder irrigation 

urine does turn pink.  I will asked for a repeat UA to assess for microscopic 

hematuria.








(5) Leukopenia


Qualifiers: 


   Leukopenia type: neutropenia   Neutropenia type: unspecified   Qualified 

Code(s): D70.9 - Neutropenia, unspecified   


Is this a current diagnosis for this admission?: Yes   


Plan: 


Improved with the discontinuation of methotrexate and Zosyn.








(6) Rheumatoid arthritis


Is this a current diagnosis for this admission?: Yes   


Plan: 


As noted above the methotrexate is on hold.  The patient was on prednisone 5 mg 

every day.  With this being discontinued I am going to administer hydrocortisone

IV in the event that there is an adrenal insufficiency.  I will also apply a 12 

mcg Duragesic patch to see if we can get better pain control.  We will monitor 

for adverse effect.








(7) Hypokalemia


Is this a current diagnosis for this admission?: Yes   


Plan: 


Continue to monitor potassium level and supplement as indicated.








(8) Hypernatremia


Is this a current diagnosis for this admission?: Yes   


Plan: 


Sodium level slightly improved.  I will increase the rate of the half-normal 

saline.  I will in fact change it to D5 half-normal as the patient has not been 

eating.  We will try and wean the patient from BiPAP and the nurse will perform 

a bedside swallow to see if we can begin feeding the patient.








(9) Compression fracture


Is this a current diagnosis for this admission?: Yes   


Plan: 


In addition to the rheumatoid arthritis the patient likely has the most pain 

because of multiple compression fractures of the thoracic vertebrae.  There are,

I believe, for fractures.  Morning has undergone kyphoplasty.  There is 

osteopenia as well.








(10) COPD (chronic obstructive pulmonary disease)


Qualifiers: 


   COPD type: unspecified COPD   Qualified Code(s): J44.9 - Chronic obstructive 

pulmonary disease, unspecified   


Is this a current diagnosis for this admission?: Yes   


Plan: 


The patient uses an inhaler at home.  Because he is on BiPAP and the inhaler is 

on formulary I will initiate DuoNeb treatments scheduled.  He has Xopenex 

treatments available if needed.  I will also initiate budesonide.  This may help

with our attempts to wean him from BiPAP.  He did have a blood gas on the sixth.

 It was normal.  I may need to repeat a blood gas if he remains on BiPAP.








- Time


Time Spent with patient: 35 or more minutes


Medications reviewed and adjusted accordingly: Yes





- Plan Summary


Plan Summary: 





The patient's son is now requesting transfer to a tertiary care facility.  Their

first choices at [facility].  I will inquire as to bed 

availability.

## 2019-03-08 NOTE — RADIOLOGY REPORT (SQ)
EXAM DESCRIPTION:  CHEST SINGLE VIEW



COMPLETED DATE/TIME:  3/8/2019 9:27 am



REASON FOR STUDY:  Respiratory failure



COMPARISON:  Chest films 1/18/2019, 2/23/2019, 3/3/2019

CT chest 3/5/2019



EXAM PARAMETERS:  NUMBER OF VIEWS: One view.

TECHNIQUE: Single frontal radiographic view of the chest acquired.

RADIATION DOSE: NA

LIMITATIONS: None.



FINDINGS:  LUNGS AND PLEURA: Patchy consolidation at both lung bases worrisome for pneumonia.  This i
s similar compared to CT chest 3/5/2019 and progressive since chest films 3/3/2019.

Stable bandlike scarring at the right lung apex and hyperinflation of the upper lobes.  No pleural ef
fusion.  No pneumothorax.

MEDIASTINUM AND HILAR STRUCTURES: No masses.  Contour normal.

HEART AND VASCULAR STRUCTURES: Heart normal in size.  Normal vasculature.

BONES: No acute findings.

HARDWARE: Right PICC line tip superior vena cava

OTHER: No other significant finding.



IMPRESSION:  Persistent bilateral lower lobe pneumonia



TECHNICAL DOCUMENTATION:  JOB ID:  [id number]

 2011 [employer]- All Rights Reserved



Reading location - IP/workstation name: [email or web address]

## 2019-03-08 NOTE — PDOC PROGRESS REPORT
Subjective


Progress Note for:: 03/08/19


Subjective:: 





Patient remains on BiPAP.  He responds to tactile stimulus and appears to be in 

pain no matter where I touch him.  Nurses report no significant change 

overnight.  They also believe he is in pain, however, he is non-verbal so 

difficult to fully assess.  He has not had any significant PO intake for several

days.  He failed his swallowing study.  


Reason For Visit: 


RENAL STONE








Physical Exam


Vital Signs: 


                                        











Temp Pulse Resp BP Pulse Ox


 


 99.0 F   103 H  30 H  129/68 H  95 


 


 03/08/19 03:59  03/08/19 03:59  03/08/19 03:59  03/08/19 03:59  03/08/19 03:59








                                 Intake & Output











 03/07/19 03/08/19 03/09/19





 06:59 06:59 06:59


 


Intake Total 4500 2900 


 


Output Total 4120 3450 


 


Balance 380 -550 


 


Weight 69.9 kg 67.3 kg 











General appearance: PRESENT: well-developed, well-nourished


Respiratory exam: PRESENT: clear to auscultation hellen, other - On BiPAP


Cardiovascular exam: PRESENT: RRR, tachycardia


GI/Abdominal exam: PRESENT: soft, tenderness


Extremities exam: PRESENT: other - TEDs in place..  ABSENT: pedal edema


Neurological exam: PRESENT: other - He withdraws to any touch.  He appears to 

shake his head back and forth, but does not open his eyes.


Focused psych exam: PRESENT: restlessness


Skin exam: PRESENT: pallor





Results


Laboratory Results: 


                                        





                                 03/08/19 04:00 





                                 03/08/19 04:00 





                                        











  03/08/19 03/08/19





  04:00 04:00


 


WBC   5.0  D


 


RBC   2.88 L


 


Hgb   9.0 L


 


Hct   27.8 L


 


MCV   97


 


MCH   31.3


 


MCHC   32.4


 


RDW   21.6 H


 


Plt Count   101 L


 


Sodium  153.4 H 


 


Potassium  3.4 L 


 


Chloride  125 H 


 


Carbon Dioxide  23 


 


Anion Gap  5 


 


BUN  13 


 


Creatinine  0.70 


 


Est GFR ( Amer)  > 60 


 


Est GFR (Non-Af Amer)  > 60 


 


Glucose  116 H 


 


Calcium  9.3 


 


Magnesium  2.0 








                                        











  02/23/19 02/23/19 02/23/19





  12:00 18:19 18:19


 


Creatine Kinase   23 L 


 


Troponin I  0.021   0.021














  02/24/19 02/24/19 02/24/19





  00:33 00:33 08:13


 


Creatine Kinase  35 L   24 L


 


Troponin I   0.022 














  02/24/19





  08:13


 


Creatine Kinase 


 


Troponin I  < 0.012











Impressions: 


                                        





Abdomen/Pelvis CT  02/23/19 00:00


IMPRESSION:


1. Unchanged nonobstructing 7 mm stone within the mid left ureter.  New 4 mm 

stone within the left renal pelvis.  Unchanged 1.2 cm stone within the superior 

calyx of the left kidney.  No hydronephrosis.


2. Unchanged positioning of the left ureteral stent.


3. No additional changes when compared to 2/22/2019.


 








Chest X-Ray  03/03/19 15:15


IMPRESSION:  NO ACUTE RADIOGRAPHIC FINDING IN THE CHEST.


 








Chest CT  03/05/19 00:00


IMPRESSION:  Bibasilar pneumonia, aspiration should be considered


 








Guidance Fluoroscopy  03/05/19 00:00


IMPRESSION:  SUCCESSFUL PLACEMENT OF A 5 FR DUAL LUMEN 41 CM PICC IN THE RIGHT 

BASILIC VEIN.


 








Interventional Vascular Procedure  03/05/19 00:00


IMPRESSION:  SUCCESSFUL PLACEMENT OF A 5 FR DUAL LUMEN 41 CM PICC IN THE RIGHT 

BASILIC VEIN.


 








PICC Line Insertion  03/05/19 00:00


IMPRESSION:  SUCCESSFUL PLACEMENT OF A 5 FR DUAL LUMEN 41 CM PICC IN THE RIGHT 

BASILIC VEIN.


 








Head CT  03/06/19 00:00


IMPRESSION:  No acute findings.


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  Diffuse atrophy, stable


EVIDENCE OF ACUTE STROKE: NO.


 














Assessment & Plan





- Diagnosis


(1) Pancytopenia, acquired


Is this a current diagnosis for this admission?: Yes   


Plan: 


Much improved today.  WBC 5.0 .  I reviewed the peripheral blood smear.  

I will also check Vit B12 level, as this could be a cause of pancytopenia, but 

usually, no this sudden.  Again, most likely due to infection and medications.  

It seems to be recovering.  Consider pRBC transfusion for HGB <8.








(2) Altered mental status


Qualifiers: 


   Altered mental status type: unspecified   Qualified Code(s): R41.82 - Altered

mental status, unspecified   


Is this a current diagnosis for this admission?: Yes   


Plan: 


I am still unclear as to his baseline, but he appears greatly uncomfortable.  

Would consider Duragesic 25 mcg patch, as long as BP remains stable.  I will 

defer to primary team.  








(3) Rheumatoid arthritis


Is this a current diagnosis for this admission?: Yes   


Plan: 


MTX on hold due to pancytopenia.  








- Plan Summary


Plan Summary: 





Please call over the weekend if needed.  Dr. Carpio will be covering.  I will 

return on Monday.

## 2019-03-09 LAB
ALBUMIN SERPL-MCNC: 1.9 G/DL (ref 3.5–5)
ANION GAP SERPL CALC-SCNC: 5 MMOL/L (ref 5–19)
ANION GAP SERPL CALC-SCNC: 5 MMOL/L (ref 5–19)
BUN SERPL-MCNC: 14 MG/DL (ref 7–20)
BUN SERPL-MCNC: 15 MG/DL (ref 7–20)
CALCIUM: 8.7 MG/DL (ref 8.4–10.2)
CALCIUM: 9.2 MG/DL (ref 8.4–10.2)
CHLORIDE SERPL-SCNC: 118 MMOL/L (ref 98–107)
CHLORIDE SERPL-SCNC: 120 MMOL/L (ref 98–107)
CO2 SERPL-SCNC: 23 MMOL/L (ref 22–30)
CO2 SERPL-SCNC: 25 MMOL/L (ref 22–30)
ERYTHROCYTE [DISTWIDTH] IN BLOOD BY AUTOMATED COUNT: 21.3 % (ref 11.5–14)
ERYTHROCYTE [DISTWIDTH] IN BLOOD BY AUTOMATED COUNT: 21.5 % (ref 11.5–14)
GLUCOSE SERPL-MCNC: 116 MG/DL (ref 75–110)
GLUCOSE SERPL-MCNC: 309 MG/DL (ref 75–110)
HCT VFR BLD CALC: 25.5 % (ref 37.9–51)
HCT VFR BLD CALC: 26.7 % (ref 37.9–51)
HGB BLD-MCNC: 8.2 G/DL (ref 13.5–17)
HGB BLD-MCNC: 8.6 G/DL (ref 13.5–17)
MCH RBC QN AUTO: 31.1 PG (ref 27–33.4)
MCH RBC QN AUTO: 31.4 PG (ref 27–33.4)
MCHC RBC AUTO-ENTMCNC: 32.2 G/DL (ref 32–36)
MCHC RBC AUTO-ENTMCNC: 32.4 G/DL (ref 32–36)
MCV RBC AUTO: 96 FL (ref 80–97)
MCV RBC AUTO: 98 FL (ref 80–97)
PHOSPHATE SERPL-MCNC: 2.7 MG/DL (ref 2.5–4.5)
PLATELET # BLD: 89 10^3/UL (ref 150–450)
PLATELET # BLD: 90 10^3/UL (ref 150–450)
POTASSIUM SERPL-SCNC: 2.9 MMOL/L (ref 3.6–5)
POTASSIUM SERPL-SCNC: 3.3 MMOL/L (ref 3.6–5)
RBC # BLD AUTO: 2.62 10^6/UL (ref 4.35–5.55)
RBC # BLD AUTO: 2.78 10^6/UL (ref 4.35–5.55)
SODIUM SERPL-SCNC: 145.9 MMOL/L (ref 137–145)
SODIUM SERPL-SCNC: 149.9 MMOL/L (ref 137–145)
WBC # BLD AUTO: 5.4 10^3/UL (ref 4–10.5)
WBC # BLD AUTO: 5.6 10^3/UL (ref 4–10.5)

## 2019-03-09 RX ADMIN — POTASSIUM CHLORIDE SCH MLS/HR: 29.8 INJECTION, SOLUTION INTRAVENOUS at 10:31

## 2019-03-09 RX ADMIN — MEROPENEM SCH MLS/HR: 1 INJECTION INTRAVENOUS at 05:00

## 2019-03-09 RX ADMIN — POTASSIUM CHLORIDE SCH MLS/HR: 29.8 INJECTION, SOLUTION INTRAVENOUS at 20:26

## 2019-03-09 RX ADMIN — POTASSIUM CHLORIDE SCH MLS/HR: 29.8 INJECTION, SOLUTION INTRAVENOUS at 12:54

## 2019-03-09 RX ADMIN — IPRATROPIUM BROMIDE AND ALBUTEROL SULFATE SCH ML: 2.5; .5 SOLUTION RESPIRATORY (INHALATION) at 08:34

## 2019-03-09 RX ADMIN — MEROPENEM SCH MLS/HR: 1 INJECTION INTRAVENOUS at 13:01

## 2019-03-09 RX ADMIN — MEROPENEM SCH MLS/HR: 1 INJECTION INTRAVENOUS at 21:11

## 2019-03-09 RX ADMIN — IPRATROPIUM BROMIDE AND ALBUTEROL SULFATE SCH ML: 2.5; .5 SOLUTION RESPIRATORY (INHALATION) at 20:09

## 2019-03-09 RX ADMIN — BUDESONIDE SCH MG: 0.5 SUSPENSION RESPIRATORY (INHALATION) at 20:09

## 2019-03-09 RX ADMIN — HYDROCORTISONE SODIUM SUCCINATE SCH MG: 100 INJECTION, POWDER, FOR SOLUTION INTRAMUSCULAR; INTRAVENOUS at 10:30

## 2019-03-09 RX ADMIN — IPRATROPIUM BROMIDE AND ALBUTEROL SULFATE SCH ML: 2.5; .5 SOLUTION RESPIRATORY (INHALATION) at 14:04

## 2019-03-09 RX ADMIN — SODIUM CHLORIDE SCH ML: 9 INJECTION INTRAMUSCULAR; INTRAVENOUS; SUBCUTANEOUS at 21:10

## 2019-03-09 RX ADMIN — Medication SCH UNIT: at 10:30

## 2019-03-09 RX ADMIN — LORAZEPAM PRN MG: 2 INJECTION INTRAMUSCULAR; INTRAVENOUS at 04:58

## 2019-03-09 RX ADMIN — POTASSIUM CHLORIDE SCH MLS/HR: 29.8 INJECTION, SOLUTION INTRAVENOUS at 18:01

## 2019-03-09 RX ADMIN — PANTOPRAZOLE SODIUM SCH MG: 40 INJECTION, POWDER, FOR SOLUTION INTRAVENOUS at 10:28

## 2019-03-09 RX ADMIN — POTASSIUM CHLORIDE SCH MLS/HR: 29.8 INJECTION, SOLUTION INTRAVENOUS at 07:37

## 2019-03-09 RX ADMIN — FENTANYL TRANSDERMAL SCH EACH: 25 PATCH, EXTENDED RELEASE TRANSDERMAL at 17:10

## 2019-03-09 RX ADMIN — DEXTROSE AND SODIUM CHLORIDE PRN MLS/HR: 5; .45 INJECTION, SOLUTION INTRAVENOUS at 07:37

## 2019-03-09 RX ADMIN — IPRATROPIUM BROMIDE AND ALBUTEROL SULFATE SCH ML: 2.5; .5 SOLUTION RESPIRATORY (INHALATION) at 02:16

## 2019-03-09 RX ADMIN — BUDESONIDE SCH MG: 0.5 SUSPENSION RESPIRATORY (INHALATION) at 08:34

## 2019-03-09 RX ADMIN — SODIUM CHLORIDE SCH ML: 9 INJECTION INTRAMUSCULAR; INTRAVENOUS; SUBCUTANEOUS at 10:30

## 2019-03-09 RX ADMIN — Medication SCH UNIT: at 21:11

## 2019-03-09 NOTE — PDOC PROGRESS REPORT
Subjective


Progress Note for:: 03/09/19


Subjective:: 





Patient still only acknowledges the encounter by moving his head.  He does not 

open his eyes.  Once again he appears to be in pain.  Urine in the [facility] 

collection is pink today.


Reason For Visit: 


RENAL STONE








Physical Exam


Vital Signs: 


                                        











Temp Pulse Resp BP Pulse Ox


 


 97.8 F   102 H  22 H  105/60   90 L


 


 03/09/19 04:14  03/09/19 14:04  03/09/19 14:04  03/09/19 04:14  03/09/19 15:33








                                 Intake & Output











 03/08/19 03/09/19 03/10/19





 06:59 06:59 07:59


 


Intake Total 3900 8650 200


 


Output Total 3450 85575 


 


Balance 450 -3825 200


 


Weight 67.3 kg 67 kg 











General appearance: PRESENT: other - Moderate distress.  Clearly uncomfortable..

 ABSENT: cooperative


Head exam: PRESENT: normocephalic


Respiratory exam: PRESENT: symmetrical.  ABSENT: accessory muscle use, clear to 

auscultation hellen - Very congested breath sounds, rales, rhonchi, wheezes


Cardiovascular exam: PRESENT: RRR, +S1, +S2


GI/Abdominal exam: PRESENT: normal bowel sounds, soft.  ABSENT: distended, 

tenderness


Rectal exam: PRESENT: deferred


Gentrourinary exam: PRESENT: indwelling catheter


Extremities exam: ABSENT: pedal edema


Musculoskeletal exam: ABSENT: ambulatory


Neurological exam: PRESENT: awake - Does not open his eyes but certainly acknowl

edges your presence, oriented to person - Appears to be oriented to person.  

Very difficult to assess for any other level of orientation..  ABSENT: alert


Psychiatric exam: PRESENT: agitated - Possibly more pain driven than marked 

agitation.





Results


Laboratory Results: 


                                        





                                 03/09/19 04:00 





                                 03/09/19 05:45 





                                        











  03/08/19 03/09/19 03/09/19





  19:50 04:00 05:45


 


WBC  4.5  5.4 


 


RBC  2.77 L  2.78 L 


 


Hgb  8.8 L  8.6 L 


 


Hct  26.7 L  26.7 L 


 


MCV  96  96 


 


MCH  31.7  31.1 


 


MCHC  32.9  32.4 


 


RDW  21.0 H  21.3 H 


 


Plt Count  91 L  89 L 


 


Sodium    149.9 H


 


Potassium    2.9 L*


 


Chloride    120 H


 


Carbon Dioxide    25


 


Anion Gap    5


 


BUN    15


 


Creatinine    0.61


 


Est GFR ( Amer)    > 60


 


Est GFR (Non-Af Amer)    > 60


 


Glucose    116 H


 


Calcium    9.2


 


Phosphorus    2.7


 


Albumin    1.9 L








                                        











  02/23/19 02/23/19 02/23/19





  12:00 18:19 18:19


 


Creatine Kinase   23 L 


 


Troponin I  0.021   0.021














  02/24/19 02/24/19 02/24/19





  00:33 00:33 08:13


 


Creatine Kinase  35 L   24 L


 


Troponin I   0.022 














  02/24/19





  08:13


 


Creatine Kinase 


 


Troponin I  < 0.012











Impressions: 


                                        





Abdomen/Pelvis CT  02/23/19 00:00


IMPRESSION:


1. Unchanged nonobstructing 7 mm stone within the mid left ureter.  New 4 mm 

stone within the left renal pelvis.  Unchanged 1.2 cm stone within the superior 

calyx of the left kidney.  No hydronephrosis.


2. Unchanged positioning of the left ureteral stent.


3. No additional changes when compared to 2/22/2019.


 








Chest CT  03/05/19 00:00


IMPRESSION:  Bibasilar pneumonia, aspiration should be considered


 








Guidance Fluoroscopy  03/05/19 00:00


IMPRESSION:  SUCCESSFUL PLACEMENT OF A 5 FR DUAL LUMEN 41 CM PICC IN THE RIGHT 

BASILIC VEIN.


 








Interventional Vascular Procedure  03/05/19 00:00


IMPRESSION:  SUCCESSFUL PLACEMENT OF A 5 FR DUAL LUMEN 41 CM PICC IN THE RIGHT 

BASILIC VEIN.


 








PICC Line Insertion  03/05/19 00:00


IMPRESSION:  SUCCESSFUL PLACEMENT OF A 5 FR DUAL LUMEN 41 CM PICC IN THE RIGHT 

BASILIC VEIN.


 








Head CT  03/06/19 00:00


IMPRESSION:  No acute findings.


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  Diffuse atrophy, stable


EVIDENCE OF ACUTE STROKE: NO.


 








Chest X-Ray  03/08/19 06:00


IMPRESSION:  Persistent bilateral lower lobe pneumonia


 














Assessment & Plan





- Diagnosis


(1) Altered mental status


Qualifiers: 


   Altered mental status type: unspecified   Qualified Code(s): R41.82 - Altered

mental status, unspecified   


Is this a current diagnosis for this admission?: Yes   


Plan: 


Not really improved from yesterday.  We will continue to treat aggressively and 

monitor.  I did increase the Duragesic patch this may help to control pain and 

allow him to relax.








(2) Aspiration pneumonia


Is this a current diagnosis for this admission?: Yes   


Plan: 


He has not been getting anything by mouth.  At this point ordering speech 

therapy would be inappropriate because he is not interacting enough.  Hopefully 

he will continue to improve and I can engage speech therapy.








(3) Occult blood in stools


Is this a current diagnosis for this admission?: Yes   


Plan: 


Hemoglobin is still trickling down.








(4) Hematuria


Qualifiers: 


   Hematuria type: gross   Qualified Code(s): R31.0 - Gross hematuria   


Is this a current diagnosis for this admission?: Yes   


Plan: 


Urine is pink today.








(5) Leukopenia


Qualifiers: 


   Leukopenia type: neutropenia   Neutropenia type: unspecified   Qualified 

Code(s): D70.9 - Neutropenia, unspecified   


Is this a current diagnosis for this admission?: Yes   


Plan: 


Resolved








(6) Rheumatoid arthritis


Is this a current diagnosis for this admission?: Yes   


Plan: 


Continue current medications.  He is off of methotrexate however due to 

suspicion of leukopenia.  Currently using fentanyl patches.








(7) Hypokalemia


Is this a current diagnosis for this admission?: Yes   


Plan: 


Despite intravenous supplementation.  The potassium is much lower.  I have 

ordered 60 mEq by IV.  I will recheck his potassium later this evening and then 

again in the morning.  I have also ordered magnesium levels.








(8) Hypernatremia


Is this a current diagnosis for this admission?: Yes   


Plan: 


Currently under 150.  Continue to monitor.  If the serum sodium reverses and 

begins to rise we will resume half normal saline.








(9) Compression fracture


Is this a current diagnosis for this admission?: Yes   


Plan: 


Multiple compression fractures.  Pain medication as above.








(10) COPD (chronic obstructive pulmonary disease)


Qualifiers: 


   COPD type: unspecified COPD   Qualified Code(s): J44.9 - Chronic obstructive 

pulmonary disease, unspecified   


Is this a current diagnosis for this admission?: Yes   


Plan: 


Nebulizer treatments as ordered.  Oxygen supplementation as needed.








(11) Thrombocytopenia


Is this a current diagnosis for this admission?: Yes   


Plan: 


Platelets are now under 100.  Continue to monitor.








- Time


Time Spent with patient: 25-34 minutes


Medications reviewed and adjusted accordingly: Yes

## 2019-03-10 LAB
ANION GAP SERPL CALC-SCNC: 3 MMOL/L (ref 5–19)
ANION GAP SERPL CALC-SCNC: 4 MMOL/L (ref 5–19)
BUN SERPL-MCNC: 15 MG/DL (ref 7–20)
BUN SERPL-MCNC: 15 MG/DL (ref 7–20)
CALCIUM: 9.1 MG/DL (ref 8.4–10.2)
CALCIUM: 9.4 MG/DL (ref 8.4–10.2)
CHLORIDE SERPL-SCNC: 121 MMOL/L (ref 98–107)
CHLORIDE SERPL-SCNC: 122 MMOL/L (ref 98–107)
CO2 SERPL-SCNC: 25 MMOL/L (ref 22–30)
CO2 SERPL-SCNC: 25 MMOL/L (ref 22–30)
ERYTHROCYTE [DISTWIDTH] IN BLOOD BY AUTOMATED COUNT: 20.9 % (ref 11.5–14)
GLUCOSE SERPL-MCNC: 105 MG/DL (ref 75–110)
GLUCOSE SERPL-MCNC: 87 MG/DL (ref 75–110)
HCT VFR BLD CALC: 25.9 % (ref 37.9–51)
HGB BLD-MCNC: 8.4 G/DL (ref 13.5–17)
MCH RBC QN AUTO: 30.8 PG (ref 27–33.4)
MCHC RBC AUTO-ENTMCNC: 32.3 G/DL (ref 32–36)
MCV RBC AUTO: 95 FL (ref 80–97)
PLATELET # BLD: 91 10^3/UL (ref 150–450)
POTASSIUM SERPL-SCNC: 3.4 MMOL/L (ref 3.6–5)
POTASSIUM SERPL-SCNC: 4 MMOL/L (ref 3.6–5)
RBC # BLD AUTO: 2.72 10^6/UL (ref 4.35–5.55)
SODIUM SERPL-SCNC: 148.9 MMOL/L (ref 137–145)
SODIUM SERPL-SCNC: 151.4 MMOL/L (ref 137–145)
WBC # BLD AUTO: 6 10^3/UL (ref 4–10.5)

## 2019-03-10 RX ADMIN — Medication SCH UNIT: at 22:29

## 2019-03-10 RX ADMIN — Medication SCH UNIT: at 10:09

## 2019-03-10 RX ADMIN — MEROPENEM SCH MLS/HR: 1 INJECTION INTRAVENOUS at 22:27

## 2019-03-10 RX ADMIN — IPRATROPIUM BROMIDE AND ALBUTEROL SULFATE SCH ML: 2.5; .5 SOLUTION RESPIRATORY (INHALATION) at 03:24

## 2019-03-10 RX ADMIN — MEROPENEM SCH MLS/HR: 1 INJECTION INTRAVENOUS at 13:32

## 2019-03-10 RX ADMIN — FAMOTIDINE SCH MG: 20 TABLET, FILM COATED ORAL at 22:40

## 2019-03-10 RX ADMIN — Medication PRN UNIT: at 05:41

## 2019-03-10 RX ADMIN — HYDROCORTISONE SODIUM SUCCINATE SCH MG: 100 INJECTION, POWDER, FOR SOLUTION INTRAMUSCULAR; INTRAVENOUS at 10:09

## 2019-03-10 RX ADMIN — BUDESONIDE SCH MG: 0.5 SUSPENSION RESPIRATORY (INHALATION) at 08:38

## 2019-03-10 RX ADMIN — BUDESONIDE SCH MG: 0.5 SUSPENSION RESPIRATORY (INHALATION) at 20:25

## 2019-03-10 RX ADMIN — QUETIAPINE FUMARATE SCH MG: 25 TABLET, FILM COATED ORAL at 22:40

## 2019-03-10 RX ADMIN — IPRATROPIUM BROMIDE AND ALBUTEROL SULFATE SCH ML: 2.5; .5 SOLUTION RESPIRATORY (INHALATION) at 14:15

## 2019-03-10 RX ADMIN — PANTOPRAZOLE SODIUM SCH MG: 40 INJECTION, POWDER, FOR SOLUTION INTRAVENOUS at 10:09

## 2019-03-10 RX ADMIN — LORAZEPAM PRN MG: 2 INJECTION INTRAMUSCULAR; INTRAVENOUS at 10:09

## 2019-03-10 RX ADMIN — SODIUM CHLORIDE SCH ML: 9 INJECTION INTRAMUSCULAR; INTRAVENOUS; SUBCUTANEOUS at 10:09

## 2019-03-10 RX ADMIN — LORAZEPAM PRN MG: 2 INJECTION INTRAMUSCULAR; INTRAVENOUS at 00:26

## 2019-03-10 RX ADMIN — IPRATROPIUM BROMIDE AND ALBUTEROL SULFATE SCH ML: 2.5; .5 SOLUTION RESPIRATORY (INHALATION) at 08:38

## 2019-03-10 RX ADMIN — SODIUM CHLORIDE SCH ML: 9 INJECTION INTRAMUSCULAR; INTRAVENOUS; SUBCUTANEOUS at 22:28

## 2019-03-10 RX ADMIN — MEROPENEM SCH MLS/HR: 1 INJECTION INTRAVENOUS at 05:05

## 2019-03-10 RX ADMIN — IPRATROPIUM BROMIDE AND ALBUTEROL SULFATE SCH ML: 2.5; .5 SOLUTION RESPIRATORY (INHALATION) at 20:25

## 2019-03-10 NOTE — PDOC PROGRESS REPORT
Subjective


Progress Note for:: 03/10/19


Subjective:: 





The patient opened his eyes somewhat.  He again answered some questions by 

nodding his head yes and no he is a mouth breather and his tongue is very dry.


Reason For Visit: 


RENAL STONE








Physical Exam


Vital Signs: 


                                        











Temp Pulse Resp BP Pulse Ox


 


 97.1 F   87   22 H  114/66   93 


 


 03/10/19 15:29  03/10/19 15:29  03/10/19 15:29  03/10/19 15:29  03/10/19 15:29








                                 Intake & Output











 03/09/19 03/10/19 03/11/19





 05:59 06:59 06:59


 


Intake Total   50


 


Output Total   4300


 


Balance   -4250


 


Weight   











General appearance: PRESENT: no acute distress, other - Minimally interactive


Head exam: PRESENT: normocephalic


Mouth exam: PRESENT: dry mucosa, other - Tongue coated


Teeth exam: PRESENT: poor dentation


Respiratory exam: PRESENT: clear to auscultation hellen - Anteriorly, symmetrical. 

ABSENT: rales, rhonchi, wheezes


Cardiovascular exam: PRESENT: RRR, +S1, +S2


GI/Abdominal exam: PRESENT: normal bowel sounds, soft.  ABSENT: distended, 

tenderness


Rectal exam: PRESENT: deferred, heme (+) stool


Gentrourinary exam: PRESENT: indwelling catheter


Neurological exam: PRESENT: awake.  ABSENT: alert


Psychiatric exam: PRESENT: flat affect.  ABSENT: agitated, anxious


Focused psych exam: ABSENT: delusional, restlessness





Results


Laboratory Results: 


                                        





                                 03/10/19 05:45 





                                 03/10/19 05:45 





                                        











  03/09/19 03/09/19 03/09/19





  17:00 17:00 23:45


 


WBC   5.6 


 


RBC   2.62 L 


 


Hgb   8.2 L 


 


Hct   25.5 L 


 


MCV   98 H 


 


MCH   31.4 


 


MCHC   32.2 


 


RDW   21.5 H 


 


Plt Count   90 L 


 


Sodium  145.9 H   148.9 H


 


Potassium  3.3 L   4.0


 


Chloride  118 H   121 H


 


Carbon Dioxide  23   25


 


Anion Gap  5   3 L


 


BUN  14   15


 


Creatinine  0.52   0.62


 


Est GFR ( Amer)  > 60   > 60


 


Est GFR (Non-Af Amer)  > 60   > 60


 


Glucose  309 H   105


 


Calcium  8.7   9.1


 


Magnesium    2.0














  03/10/19 03/10/19





  05:45 05:45


 


WBC  6.0 


 


RBC  2.72 L 


 


Hgb  8.4 L 


 


Hct  25.9 L 


 


MCV  95 


 


MCH  30.8 


 


MCHC  32.3 


 


RDW  20.9 H 


 


Plt Count  91 L 


 


Sodium   151.4 H


 


Potassium   3.4 L


 


Chloride   122 H


 


Carbon Dioxide   25


 


Anion Gap   4 L


 


BUN   15


 


Creatinine   0.67


 


Est GFR ( Amer)   > 60


 


Est GFR (Non-Af Amer)   > 60


 


Glucose   87


 


Calcium   9.4


 


Magnesium   2.1








                                        











  02/23/19 02/23/19 02/23/19





  12:00 18:19 18:19


 


Creatine Kinase   23 L 


 


Troponin I  0.021   0.021














  02/24/19 02/24/19 02/24/19





  00:33 00:33 08:13


 


Creatine Kinase  35 L   24 L


 


Troponin I   0.022 














  02/24/19





  08:13


 


Creatine Kinase 


 


Troponin I  < 0.012











Impressions: 


                                        





Abdomen/Pelvis CT  02/23/19 00:00


IMPRESSION:


1. Unchanged nonobstructing 7 mm stone within the mid left ureter.  New 4 mm 

stone within the left renal pelvis.  Unchanged 1.2 cm stone within the superior 

calyx of the left kidney.  No hydronephrosis.


2. Unchanged positioning of the left ureteral stent.


3. No additional changes when compared to 2/22/2019.


 








Chest CT  03/05/19 00:00


IMPRESSION:  Bibasilar pneumonia, aspiration should be considered


 








Guidance Fluoroscopy  03/05/19 00:00


IMPRESSION:  SUCCESSFUL PLACEMENT OF A 5 FR DUAL LUMEN 41 CM PICC IN THE RIGHT 

BASILIC VEIN.


 








Interventional Vascular Procedure  03/05/19 00:00


IMPRESSION:  SUCCESSFUL PLACEMENT OF A 5 FR DUAL LUMEN 41 CM PICC IN THE RIGHT 

BASILIC VEIN.


 








PICC Line Insertion  03/05/19 00:00


IMPRESSION:  SUCCESSFUL PLACEMENT OF A 5 FR DUAL LUMEN 41 CM PICC IN THE RIGHT 

BASILIC VEIN.


 








Head CT  03/06/19 00:00


IMPRESSION:  No acute findings.


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  Diffuse atrophy, stable


EVIDENCE OF ACUTE STROKE: NO.


 








Chest X-Ray  03/08/19 06:00


IMPRESSION:  Persistent bilateral lower lobe pneumonia


 














Assessment & Plan





- Diagnosis


(1) Altered mental status


Qualifiers: 


   Altered mental status type: unspecified   Qualified Code(s): R41.82 - Altered

mental status, unspecified   


Is this a current diagnosis for this admission?: Yes   


Plan: 


Secondary to infection.  Appears to be improving but difficult to say as 

patient's interaction is limited.








(2) Aspiration pneumonia


Is this a current diagnosis for this admission?: Yes   


Plan: 


Complete antibiotics as ordered.  Not awake enough for any kind of swallow 

evaluation so we will place a nasogastric tube.








(3) Occult blood in stools


Is this a current diagnosis for this admission?: Yes   


Plan: 


Hemoccult was positive at the last stool.  I have started H2 blockers.  He is 

currently not on any blood thinners.  We will continue to monitor hemoglobin.  

Transfuse if necessary.








(4) Hematuria


Qualifiers: 


   Hematuria type: gross   Qualified Code(s): R31.0 - Gross hematuria   


Is this a current diagnosis for this admission?: Yes   


Plan: 


Still having hematuria.  Will look into transfer to a center with urology to 

further address the stents and stones.  While there they can investigate his 

guaiac positive stool.








(5) Leukopenia


Qualifiers: 


   Leukopenia type: neutropenia   Neutropenia type: unspecified   Qualified 

Code(s): D70.9 - Neutropenia, unspecified   


Is this a current diagnosis for this admission?: Yes   


Plan: 


Resolved








(6) Rheumatoid arthritis


Is this a current diagnosis for this admission?: Yes   


Plan: 


Will resume methotrexate slowly and see if it induces the leukopenia again.  If 

not was likely the antibiotics.








(7) Hypokalemia


Is this a current diagnosis for this admission?: Yes   


Plan: 


I am placing a nasogastric tube.  I will start potassium chloride through the 

tube.








(8) Hypernatremia


Is this a current diagnosis for this admission?: Yes   


Plan: 


Will utilize free water through the nasogastric tube to help with hypernatremia








(9) Compression fracture


Is this a current diagnosis for this admission?: Yes   


Plan: 


Consider Miacalcin when the patient recovers








(10) COPD (chronic obstructive pulmonary disease)


Qualifiers: 


   COPD type: unspecified COPD   Qualified Code(s): J44.9 - Chronic obstructive 

pulmonary disease, unspecified   


Is this a current diagnosis for this admission?: Yes   


Plan: 


Continue inhalers








(11) Thrombocytopenia


Is this a current diagnosis for this admission?: Yes   


Plan: 


Stable








- Time


Time Spent with patient: 25-34 minutes





- Plan Summary


Plan Summary: 





I spoke with the patient's son.  He agrees to a nasogastric tube.  We will be 

able to administer broader collection of medications.  We will look to transfer 

to urologist tomorrow or Tuesday.  He continues to have hematuria and guaiac 

positive stool despite no aspirin or VTE prophylaxis

## 2019-03-10 NOTE — RADIOLOGY REPORT (SQ)
EXAM DESCRIPTION: 



XR ABDOMEN 1 VIEW (KUB)



COMPLETED DATE/TME:  03/10/2019 18:28



CLINICAL HISTORY: 



73 years, Male, Check Placement of NG Tube



Findings: No free intraperitoneal air. Indwelling left ureteral

stent is noted. Enteric tube is in place with tip below the

diaphragm in the stomach. No dilated loops of bowel.



IMPRESSION:



Enteric tube in place.

## 2019-03-11 LAB
ADD MANUAL DIFF: NO
ALBUMIN SERPL-MCNC: 2.2 G/DL (ref 3.5–5)
ANION GAP SERPL CALC-SCNC: 5 MMOL/L (ref 5–19)
ANION GAP SERPL CALC-SCNC: 6 MMOL/L (ref 5–19)
BASOPHILS # BLD AUTO: 0 10^3/UL (ref 0–0.2)
BASOPHILS NFR BLD AUTO: 0.1 % (ref 0–2)
BUN SERPL-MCNC: 17 MG/DL (ref 7–20)
BUN SERPL-MCNC: 18 MG/DL (ref 7–20)
CALCIUM: 9.4 MG/DL (ref 8.4–10.2)
CALCIUM: 9.6 MG/DL (ref 8.4–10.2)
CHLORIDE SERPL-SCNC: 119 MMOL/L (ref 98–107)
CHLORIDE SERPL-SCNC: 119 MMOL/L (ref 98–107)
CO2 SERPL-SCNC: 25 MMOL/L (ref 22–30)
CO2 SERPL-SCNC: 27 MMOL/L (ref 22–30)
EOSINOPHIL # BLD AUTO: 0 10^3/UL (ref 0–0.6)
EOSINOPHIL NFR BLD AUTO: 0.7 % (ref 0–6)
ERYTHROCYTE [DISTWIDTH] IN BLOOD BY AUTOMATED COUNT: 20.9 % (ref 11.5–14)
GLUCOSE SERPL-MCNC: 131 MG/DL (ref 75–110)
GLUCOSE SERPL-MCNC: 93 MG/DL (ref 75–110)
HCT VFR BLD CALC: 27.6 % (ref 37.9–51)
HGB BLD-MCNC: 9 G/DL (ref 13.5–17)
LYMPHOCYTES # BLD AUTO: 0.5 10^3/UL (ref 0.5–4.7)
LYMPHOCYTES NFR BLD AUTO: 8.7 % (ref 13–45)
MCH RBC QN AUTO: 31.1 PG (ref 27–33.4)
MCHC RBC AUTO-ENTMCNC: 32.6 G/DL (ref 32–36)
MCV RBC AUTO: 96 FL (ref 80–97)
MONOCYTES # BLD AUTO: 0.5 10^3/UL (ref 0.1–1.4)
MONOCYTES NFR BLD AUTO: 8.8 % (ref 3–13)
NEUTROPHILS # BLD AUTO: 4.9 10^3/UL (ref 1.7–8.2)
NEUTS SEG NFR BLD AUTO: 81.7 % (ref 42–78)
PLATELET # BLD: 95 10^3/UL (ref 150–450)
POTASSIUM SERPL-SCNC: 3 MMOL/L (ref 3.6–5)
POTASSIUM SERPL-SCNC: 3.2 MMOL/L (ref 3.6–5)
RBC # BLD AUTO: 2.89 10^6/UL (ref 4.35–5.55)
SODIUM SERPL-SCNC: 150.4 MMOL/L (ref 137–145)
SODIUM SERPL-SCNC: 151.4 MMOL/L (ref 137–145)
TOTAL CELLS COUNTED % (AUTO): 100 %
WBC # BLD AUTO: 6 10^3/UL (ref 4–10.5)

## 2019-03-11 PROCEDURE — 5A09357 ASSISTANCE WITH RESPIRATORY VENTILATION, LESS THAN 24 CONSECUTIVE HOURS, CONTINUOUS POSITIVE AIRWAY PRESSURE: ICD-10-PCS | Performed by: INTERNAL MEDICINE

## 2019-03-11 RX ADMIN — Medication SCH ML: at 17:54

## 2019-03-11 RX ADMIN — NYSTATIN SCH UNIT: 100000 SUSPENSION ORAL at 13:27

## 2019-03-11 RX ADMIN — NYSTATIN SCH UNIT: 100000 SUSPENSION ORAL at 10:45

## 2019-03-11 RX ADMIN — QUETIAPINE FUMARATE SCH MG: 25 TABLET, FILM COATED ORAL at 09:05

## 2019-03-11 RX ADMIN — DIPHENHYDRAMINE HYDROCHLORIDE PRN MG: 50 INJECTION INTRAMUSCULAR; INTRAVENOUS at 12:39

## 2019-03-11 RX ADMIN — IPRATROPIUM BROMIDE AND ALBUTEROL SULFATE SCH: 2.5; .5 SOLUTION RESPIRATORY (INHALATION) at 02:00

## 2019-03-11 RX ADMIN — MEROPENEM SCH MLS/HR: 1 INJECTION INTRAVENOUS at 05:33

## 2019-03-11 RX ADMIN — BUDESONIDE SCH MG: 0.5 SUSPENSION RESPIRATORY (INHALATION) at 08:18

## 2019-03-11 RX ADMIN — NYSTATIN SCH UNIT: 100000 SUSPENSION ORAL at 23:08

## 2019-03-11 RX ADMIN — FAMOTIDINE SCH MG: 20 TABLET, FILM COATED ORAL at 22:12

## 2019-03-11 RX ADMIN — SODIUM CHLORIDE SCH ML: 9 INJECTION INTRAMUSCULAR; INTRAVENOUS; SUBCUTANEOUS at 22:11

## 2019-03-11 RX ADMIN — Medication SCH UNIT: at 22:11

## 2019-03-11 RX ADMIN — FAMOTIDINE SCH MG: 20 TABLET, FILM COATED ORAL at 09:05

## 2019-03-11 RX ADMIN — NYSTATIN SCH UNIT: 100000 SUSPENSION ORAL at 04:01

## 2019-03-11 RX ADMIN — IPRATROPIUM BROMIDE AND ALBUTEROL SULFATE SCH ML: 2.5; .5 SOLUTION RESPIRATORY (INHALATION) at 13:53

## 2019-03-11 RX ADMIN — HYDROCORTISONE SODIUM SUCCINATE SCH MG: 100 INJECTION, POWDER, FOR SOLUTION INTRAMUSCULAR; INTRAVENOUS at 09:05

## 2019-03-11 RX ADMIN — MEROPENEM SCH MLS/HR: 1 INJECTION INTRAVENOUS at 22:11

## 2019-03-11 RX ADMIN — NYSTATIN SCH UNIT: 100000 SUSPENSION ORAL at 17:20

## 2019-03-11 RX ADMIN — Medication PRN MLS/HR: at 09:08

## 2019-03-11 RX ADMIN — PANTOPRAZOLE SODIUM SCH MG: 40 INJECTION, POWDER, FOR SOLUTION INTRAVENOUS at 09:05

## 2019-03-11 RX ADMIN — CALCIUM CARBONATE-CHOLECALCIFEROL TAB 250 MG-125 UNIT SCH TAB: 250-125 TAB at 09:07

## 2019-03-11 RX ADMIN — QUETIAPINE FUMARATE SCH MG: 25 TABLET, FILM COATED ORAL at 22:12

## 2019-03-11 RX ADMIN — IPRATROPIUM BROMIDE AND ALBUTEROL SULFATE SCH ML: 2.5; .5 SOLUTION RESPIRATORY (INHALATION) at 20:22

## 2019-03-11 RX ADMIN — Medication PRN MLS/HR: at 01:00

## 2019-03-11 RX ADMIN — Medication SCH UNIT: at 09:04

## 2019-03-11 RX ADMIN — CALCIUM CARBONATE-CHOLECALCIFEROL TAB 250 MG-125 UNIT SCH TAB: 250-125 TAB at 17:20

## 2019-03-11 RX ADMIN — SODIUM CHLORIDE SCH ML: 9 INJECTION INTRAMUSCULAR; INTRAVENOUS; SUBCUTANEOUS at 09:05

## 2019-03-11 RX ADMIN — MEROPENEM SCH MLS/HR: 1 INJECTION INTRAVENOUS at 13:28

## 2019-03-11 RX ADMIN — BUDESONIDE SCH MG: 0.5 SUSPENSION RESPIRATORY (INHALATION) at 20:22

## 2019-03-11 RX ADMIN — IPRATROPIUM BROMIDE AND ALBUTEROL SULFATE SCH ML: 2.5; .5 SOLUTION RESPIRATORY (INHALATION) at 08:18

## 2019-03-11 NOTE — PDOC PROGRESS REPORT
Subjective


Progress Note for:: 03/11/19


Subjective:: 





Still resting in bed.  Still nodding his head to answer questions.  Still barely

opening his eyes.


Reason For Visit: 


RENAL STONE








Physical Exam


Vital Signs: 


                                        











Temp Pulse Resp BP Pulse Ox


 


 98.4 F   89   19   120/70   94 


 


 03/11/19 07:21  03/11/19 08:18  03/11/19 12:17  03/11/19 13:00  03/11/19 12:17








                                 Intake & Output











 03/10/19 03/11/19 03/12/19





 06:59 06:59 06:59


 


Intake Total  3554 2871


 


Output Total  5525 


 


Balance  -1971 2871


 


Weight  68.8 kg 











General appearance: PRESENT: other - Minimally interactive


Head exam: PRESENT: normocephalic


Respiratory exam: PRESENT: clear to auscultation hellen, symmetrical.  ABSENT: 

rales, rhonchi, wheezes


Cardiovascular exam: PRESENT: irregular rhythm


GI/Abdominal exam: PRESENT: normal bowel sounds, soft.  ABSENT: tenderness


Gentrourinary exam: PRESENT: indwelling catheter - With continuous bladder 

irrigation


Neurological exam: PRESENT: awake.  ABSENT: alert - Minimal interaction


Psychiatric exam: ABSENT: agitated


Focused psych exam: ABSENT: restlessness





Results


Laboratory Results: 


                                        





                                 03/11/19 05:35 





                                 03/11/19 05:35 





                                        











  03/11/19 03/11/19 03/11/19





  01:12 01:12 05:35


 


WBC   


 


RBC   


 


Hgb   


 


Hct   


 


MCV   


 


MCH   


 


MCHC   


 


RDW   


 


Plt Count   


 


Seg Neutrophils %   


 


Lymphocytes %   


 


Monocytes %   


 


Eosinophils %   


 


Basophils %   


 


Absolute Neutrophils   


 


Absolute Lymphocytes   


 


Absolute Monocytes   


 


Absolute Eosinophils   


 


Absolute Basophils   


 


Sodium   151.4 H  150.4 H


 


Potassium   3.2 L  3.0 L*


 


Chloride   119 H  119 H


 


Carbon Dioxide   27  25


 


Anion Gap   5  6


 


BUN   17  18


 


Creatinine   0.68  0.77


 


Est GFR ( Amer)   > 60  > 60


 


Est GFR (Non-Af Amer)   > 60  > 60


 


Glucose   93  131 H


 


Calcium   9.4  9.6


 


Magnesium  2.1   2.2


 


Albumin    2.2 L














  03/11/19





  05:35


 


WBC  6.0


 


RBC  2.89 L


 


Hgb  9.0 L


 


Hct  27.6 L


 


MCV  96


 


MCH  31.1


 


MCHC  32.6


 


RDW  20.9 H


 


Plt Count  95 L


 


Seg Neutrophils %  81.7 H


 


Lymphocytes %  8.7 L


 


Monocytes %  8.8


 


Eosinophils %  0.7


 


Basophils %  0.1


 


Absolute Neutrophils  4.9


 


Absolute Lymphocytes  0.5


 


Absolute Monocytes  0.5


 


Absolute Eosinophils  0.0


 


Absolute Basophils  0.0


 


Sodium 


 


Potassium 


 


Chloride 


 


Carbon Dioxide 


 


Anion Gap 


 


BUN 


 


Creatinine 


 


Est GFR ( Amer) 


 


Est GFR (Non-Af Amer) 


 


Glucose 


 


Calcium 


 


Magnesium 


 


Albumin 








                                        











  02/23/19 02/23/19 02/23/19





  12:00 18:19 18:19


 


Creatine Kinase   23 L 


 


Troponin I  0.021   0.021














  02/24/19 02/24/19 02/24/19





  00:33 00:33 08:13


 


Creatine Kinase  35 L   24 L


 


Troponin I   0.022 














  02/24/19





  08:13


 


Creatine Kinase 


 


Troponin I  < 0.012











Impressions: 


                                        





Abdomen/Pelvis CT  02/23/19 00:00


IMPRESSION:


1. Unchanged nonobstructing 7 mm stone within the mid left ureter.  New 4 mm st

one within the left renal pelvis.  Unchanged 1.2 cm stone within the superior 

calyx of the left kidney.  No hydronephrosis.


2. Unchanged positioning of the left ureteral stent.


3. No additional changes when compared to 2/22/2019.


 








Chest CT  03/05/19 00:00


IMPRESSION:  Bibasilar pneumonia, aspiration should be considered


 








Guidance Fluoroscopy  03/05/19 00:00


IMPRESSION:  SUCCESSFUL PLACEMENT OF A 5 FR DUAL LUMEN 41 CM PICC IN THE RIGHT 

BASILIC VEIN.


 








Interventional Vascular Procedure  03/05/19 00:00


IMPRESSION:  SUCCESSFUL PLACEMENT OF A 5 FR DUAL LUMEN 41 CM PICC IN THE RIGHT 

BASILIC VEIN.


 








PICC Line Insertion  03/05/19 00:00


IMPRESSION:  SUCCESSFUL PLACEMENT OF A 5 FR DUAL LUMEN 41 CM PICC IN THE RIGHT 

BASILIC VEIN.


 








Head CT  03/06/19 00:00


IMPRESSION:  No acute findings.


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  Diffuse atrophy, stable


EVIDENCE OF ACUTE STROKE: NO.


 








Chest X-Ray  03/08/19 06:00


IMPRESSION:  Persistent bilateral lower lobe pneumonia


 








KUB X-Ray  03/10/19 18:28


IMPRESSION:


 


Enteric tube in place.


 














Assessment & Plan





- Diagnosis


(1) Altered mental status


Qualifiers: 


   Altered mental status type: unspecified   Qualified Code(s): R41.82 - Altered

mental status, unspecified   


Is this a current diagnosis for this admission?: Yes   


Plan: 


Still unsure of the limited responsiveness.  I fully believe that he hears since

he does shake his head in a specific response to questions.  His serum sodium is

slowly coming down.  I increased the free water flushes.








(2) Aspiration pneumonia


Is this a current diagnosis for this admission?: Yes   


Plan: 


Resolved.  Nasogastric tube and to prevent aspiration.








(3) Occult blood in stools


Is this a current diagnosis for this admission?: Yes   


Plan: 


Hemoglobin is stable.  Continue to monitor.








(4) Hematuria


Qualifiers: 


   Hematuria type: gross   Qualified Code(s): R31.0 - Gross hematuria   


Is this a current diagnosis for this admission?: Yes   


Plan: 


Anytime the continuous bladder irrigation slows the urine concentrates and 

appears to have blood.








(5) Leukopenia


Qualifiers: 


   Leukopenia type: neutropenia   Neutropenia type: unspecified   Qualified 

Code(s): D70.9 - Neutropenia, unspecified   


Is this a current diagnosis for this admission?: Yes   


Plan: 


Resolved








(6) Rheumatoid arthritis


Is this a current diagnosis for this admission?: Yes   


Plan: 


Stable off of methotrexate








(7) Hypokalemia


Is this a current diagnosis for this admission?: Yes   


Plan: 


I increased the potassium chloride to 20 mEq 3 times daily through the 

nasogastric tube.  Continue to monitor.








(8) Hypernatremia


Is this a current diagnosis for this admission?: Yes   


Plan: 


I will administer another liter of half-normal saline.








(9) Compression fracture


Is this a current diagnosis for this admission?: Yes   


Plan: 


Continue pain management








(10) COPD (chronic obstructive pulmonary disease)


Qualifiers: 


   COPD type: unspecified COPD   Qualified Code(s): J44.9 - Chronic obstructive 

pulmonary disease, unspecified   


Is this a current diagnosis for this admission?: Yes   


Plan: 


Continue current regimen








(11) Thrombocytopenia


Is this a current diagnosis for this admission?: Yes   


Plan: 


Stable








- Time


Time Spent with patient: 15-24 minutes


Medications reviewed and adjusted accordingly: Yes

## 2019-03-11 NOTE — PDOC PROGRESS REPORT
Subjective


Progress Note for:: 03/11/19


Subjective:: 





Patient remains on BiPAP, with eye closed.  He is unresponsive, but withdraws to

almost any touch and grimaces in pain.  


Reason For Visit: 


RENAL STONE








Physical Exam


Vital Signs: 


                                        











Temp Pulse Resp BP Pulse Ox


 


 98.9 F   101 H  23 H  112/68   93 


 


 03/10/19 23:45  03/11/19 06:00  03/11/19 04:31  03/11/19 06:00  03/11/19 06:00








                                 Intake & Output











 03/10/19 03/11/19 03/12/19





 06:59 06:59 06:59


 


Intake Total  3554 114


 


Output Total  5525 


 


Balance  -1971 114


 


Weight  68.8 kg 











General appearance: PRESENT: well-developed, well-nourished


Respiratory exam: PRESENT: unlabored


Cardiovascular exam: PRESENT: other - Heart sound obscured.


Neurological exam: PRESENT: other - Withdraws to touch.


Skin exam: PRESENT: normal color





Results


Laboratory Results: 


                                        





                                 03/11/19 05:35 





                                 03/11/19 05:35 





                                        











  03/11/19 03/11/19 03/11/19





  01:12 01:12 05:35


 


WBC   


 


RBC   


 


Hgb   


 


Hct   


 


MCV   


 


MCH   


 


MCHC   


 


RDW   


 


Plt Count   


 


Seg Neutrophils %   


 


Lymphocytes %   


 


Monocytes %   


 


Eosinophils %   


 


Basophils %   


 


Absolute Neutrophils   


 


Absolute Lymphocytes   


 


Absolute Monocytes   


 


Absolute Eosinophils   


 


Absolute Basophils   


 


Sodium   151.4 H  150.4 H


 


Potassium   3.2 L  3.0 L*


 


Chloride   119 H  119 H


 


Carbon Dioxide   27  25


 


Anion Gap   5  6


 


BUN   17  18


 


Creatinine   0.68  0.77


 


Est GFR ( Amer)   > 60  > 60


 


Est GFR (Non-Af Amer)   > 60  > 60


 


Glucose   93  131 H


 


Calcium   9.4  9.6


 


Magnesium  2.1   2.2


 


Albumin    2.2 L














  03/11/19





  05:35


 


WBC  6.0


 


RBC  2.89 L


 


Hgb  9.0 L


 


Hct  27.6 L


 


MCV  96


 


MCH  31.1


 


MCHC  32.6


 


RDW  20.9 H


 


Plt Count  95 L


 


Seg Neutrophils %  81.7 H


 


Lymphocytes %  8.7 L


 


Monocytes %  8.8


 


Eosinophils %  0.7


 


Basophils %  0.1


 


Absolute Neutrophils  4.9


 


Absolute Lymphocytes  0.5


 


Absolute Monocytes  0.5


 


Absolute Eosinophils  0.0


 


Absolute Basophils  0.0


 


Sodium 


 


Potassium 


 


Chloride 


 


Carbon Dioxide 


 


Anion Gap 


 


BUN 


 


Creatinine 


 


Est GFR ( Amer) 


 


Est GFR (Non-Af Amer) 


 


Glucose 


 


Calcium 


 


Magnesium 


 


Albumin 








                                        











  02/23/19 02/23/19 02/23/19





  12:00 18:19 18:19


 


Creatine Kinase   23 L 


 


Troponin I  0.021   0.021














  02/24/19 02/24/19 02/24/19





  00:33 00:33 08:13


 


Creatine Kinase  35 L   24 L


 


Troponin I   0.022 














  02/24/19





  08:13


 


Creatine Kinase 


 


Troponin I  < 0.012











Impressions: 


                                        





Abdomen/Pelvis CT  02/23/19 00:00


IMPRESSION:


1. Unchanged nonobstructing 7 mm stone within the mid left ureter.  New 4 mm 

stone within the left renal pelvis.  Unchanged 1.2 cm stone within the superior 

calyx of the left kidney.  No hydronephrosis.


2. Unchanged positioning of the left ureteral stent.


3. No additional changes when compared to 2/22/2019.


 








Chest CT  03/05/19 00:00


IMPRESSION:  Bibasilar pneumonia, aspiration should be considered


 








Guidance Fluoroscopy  03/05/19 00:00


IMPRESSION:  SUCCESSFUL PLACEMENT OF A 5 FR DUAL LUMEN 41 CM PICC IN THE RIGHT 

BASILIC VEIN.


 








Interventional Vascular Procedure  03/05/19 00:00


IMPRESSION:  SUCCESSFUL PLACEMENT OF A 5 FR DUAL LUMEN 41 CM PICC IN THE RIGHT 

BASILIC VEIN.


 








PICC Line Insertion  03/05/19 00:00


IMPRESSION:  SUCCESSFUL PLACEMENT OF A 5 FR DUAL LUMEN 41 CM PICC IN THE RIGHT 

BASILIC VEIN.


 








Head CT  03/06/19 00:00


IMPRESSION:  No acute findings.


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  Diffuse atrophy, stable


EVIDENCE OF ACUTE STROKE: NO.


 








Chest X-Ray  03/08/19 06:00


IMPRESSION:  Persistent bilateral lower lobe pneumonia


 








KUB X-Ray  03/10/19 18:28


IMPRESSION:


 


Enteric tube in place.


 














Assessment & Plan





- Diagnosis


(1) Pancytopenia, acquired


Is this a current diagnosis for this admission?: Yes   


Plan: 


WBC count now back to normal.  He continues to have mild anemia and 

thrombocytopenia, but these are stable.  Would continue to monitor.  No 

indication for transfusions at this time.  








(2) Altered mental status


Qualifiers: 


   Altered mental status type: unspecified   Qualified Code(s): R41.82 - Altered

mental status, unspecified   


Is this a current diagnosis for this admission?: Yes   





(3) Rheumatoid arthritis


Is this a current diagnosis for this admission?: Yes   





- Plan Summary


Plan Summary: 





He was recently started on tube feedings.  His Na is quite high.  Hopefully, 

free water will help this.

## 2019-03-12 LAB
ANION GAP SERPL CALC-SCNC: 2 MMOL/L (ref 5–19)
BUN SERPL-MCNC: 23 MG/DL (ref 7–20)
CALCIUM: 9.3 MG/DL (ref 8.4–10.2)
CHLORIDE SERPL-SCNC: 121 MMOL/L (ref 98–107)
CO2 SERPL-SCNC: 30 MMOL/L (ref 22–30)
GLUCOSE SERPL-MCNC: 112 MG/DL (ref 75–110)
POTASSIUM SERPL-SCNC: 3.3 MMOL/L (ref 3.6–5)
SODIUM SERPL-SCNC: 152.9 MMOL/L (ref 137–145)

## 2019-03-12 RX ADMIN — SODIUM CHLORIDE SCH ML: 9 INJECTION INTRAMUSCULAR; INTRAVENOUS; SUBCUTANEOUS at 22:56

## 2019-03-12 RX ADMIN — NYSTATIN SCH UNIT: 100000 SUSPENSION ORAL at 20:33

## 2019-03-12 RX ADMIN — DILTIAZEM HYDROCHLORIDE SCH MG: 30 TABLET, FILM COATED ORAL at 11:05

## 2019-03-12 RX ADMIN — Medication SCH UNIT: at 22:55

## 2019-03-12 RX ADMIN — POTASSIUM CHLORIDE SCH MEQ: 1.5 SOLUTION ORAL at 20:28

## 2019-03-12 RX ADMIN — FENTANYL TRANSDERMAL SCH EACH: 25 PATCH, EXTENDED RELEASE TRANSDERMAL at 10:57

## 2019-03-12 RX ADMIN — CALCIUM CARBONATE-CHOLECALCIFEROL TAB 250 MG-125 UNIT SCH TAB: 250-125 TAB at 10:45

## 2019-03-12 RX ADMIN — MEROPENEM SCH MLS/HR: 1 INJECTION INTRAVENOUS at 14:17

## 2019-03-12 RX ADMIN — FAMOTIDINE SCH MG: 20 TABLET, FILM COATED ORAL at 22:55

## 2019-03-12 RX ADMIN — FAMOTIDINE SCH MG: 20 TABLET, FILM COATED ORAL at 10:57

## 2019-03-12 RX ADMIN — MORPHINE SULFATE PRN MG: 10 INJECTION INTRAMUSCULAR; INTRAVENOUS; SUBCUTANEOUS at 10:52

## 2019-03-12 RX ADMIN — NYSTATIN SCH UNIT: 100000 SUSPENSION ORAL at 14:18

## 2019-03-12 RX ADMIN — IPRATROPIUM BROMIDE AND ALBUTEROL SULFATE SCH ML: 2.5; .5 SOLUTION RESPIRATORY (INHALATION) at 14:00

## 2019-03-12 RX ADMIN — MORPHINE SULFATE PRN MG: 10 INJECTION INTRAMUSCULAR; INTRAVENOUS; SUBCUTANEOUS at 07:46

## 2019-03-12 RX ADMIN — POTASSIUM CHLORIDE SCH MEQ: 1.5 SOLUTION ORAL at 14:18

## 2019-03-12 RX ADMIN — IPRATROPIUM BROMIDE AND ALBUTEROL SULFATE SCH ML: 2.5; .5 SOLUTION RESPIRATORY (INHALATION) at 08:17

## 2019-03-12 RX ADMIN — DILTIAZEM HYDROCHLORIDE SCH MG: 30 TABLET, FILM COATED ORAL at 05:30

## 2019-03-12 RX ADMIN — NYSTATIN SCH: 100000 SUSPENSION ORAL at 12:00

## 2019-03-12 RX ADMIN — BUDESONIDE SCH MG: 0.5 SUSPENSION RESPIRATORY (INHALATION) at 08:18

## 2019-03-12 RX ADMIN — MEROPENEM SCH MLS/HR: 1 INJECTION INTRAVENOUS at 22:55

## 2019-03-12 RX ADMIN — HYDROCORTISONE SODIUM SUCCINATE SCH MG: 100 INJECTION, POWDER, FOR SOLUTION INTRAMUSCULAR; INTRAVENOUS at 10:45

## 2019-03-12 RX ADMIN — MORPHINE SULFATE PRN MG: 10 INJECTION INTRAMUSCULAR; INTRAVENOUS; SUBCUTANEOUS at 19:45

## 2019-03-12 RX ADMIN — Medication SCH UNIT: at 10:58

## 2019-03-12 RX ADMIN — QUETIAPINE FUMARATE SCH MG: 25 TABLET, FILM COATED ORAL at 22:55

## 2019-03-12 RX ADMIN — DIPHENHYDRAMINE HYDROCHLORIDE PRN MG: 50 INJECTION INTRAMUSCULAR; INTRAVENOUS at 14:22

## 2019-03-12 RX ADMIN — MORPHINE SULFATE PRN MG: 10 INJECTION INTRAMUSCULAR; INTRAVENOUS; SUBCUTANEOUS at 14:17

## 2019-03-12 RX ADMIN — NYSTATIN SCH UNIT: 100000 SUSPENSION ORAL at 22:54

## 2019-03-12 RX ADMIN — DILTIAZEM HYDROCHLORIDE SCH MG: 30 TABLET, FILM COATED ORAL at 20:28

## 2019-03-12 RX ADMIN — Medication SCH ML: at 10:56

## 2019-03-12 RX ADMIN — IPRATROPIUM BROMIDE AND ALBUTEROL SULFATE SCH ML: 2.5; .5 SOLUTION RESPIRATORY (INHALATION) at 20:40

## 2019-03-12 RX ADMIN — LORAZEPAM PRN MG: 2 INJECTION INTRAMUSCULAR; INTRAVENOUS at 19:45

## 2019-03-12 RX ADMIN — DILTIAZEM HYDROCHLORIDE SCH MG: 30 TABLET, FILM COATED ORAL at 00:00

## 2019-03-12 RX ADMIN — DIPHENHYDRAMINE HYDROCHLORIDE PRN MG: 50 INJECTION INTRAMUSCULAR; INTRAVENOUS at 10:52

## 2019-03-12 RX ADMIN — POTASSIUM CHLORIDE SCH MEQ: 1.5 SOLUTION ORAL at 10:45

## 2019-03-12 RX ADMIN — MORPHINE SULFATE PRN MG: 10 INJECTION INTRAMUSCULAR; INTRAVENOUS; SUBCUTANEOUS at 15:55

## 2019-03-12 RX ADMIN — BUDESONIDE SCH MG: 0.5 SUSPENSION RESPIRATORY (INHALATION) at 20:40

## 2019-03-12 RX ADMIN — CALCIUM CARBONATE-CHOLECALCIFEROL TAB 250 MG-125 UNIT SCH TAB: 250-125 TAB at 20:28

## 2019-03-12 RX ADMIN — MEROPENEM SCH MLS/HR: 1 INJECTION INTRAVENOUS at 05:29

## 2019-03-12 RX ADMIN — SODIUM CHLORIDE SCH ML: 9 INJECTION INTRAMUSCULAR; INTRAVENOUS; SUBCUTANEOUS at 10:58

## 2019-03-12 RX ADMIN — QUETIAPINE FUMARATE SCH MG: 25 TABLET, FILM COATED ORAL at 10:55

## 2019-03-12 RX ADMIN — Medication SCH ML: at 20:32

## 2019-03-12 RX ADMIN — DIPHENHYDRAMINE HYDROCHLORIDE PRN MG: 50 INJECTION INTRAMUSCULAR; INTRAVENOUS at 18:55

## 2019-03-12 RX ADMIN — LORAZEPAM PRN MG: 2 INJECTION INTRAMUSCULAR; INTRAVENOUS at 10:49

## 2019-03-12 RX ADMIN — IPRATROPIUM BROMIDE AND ALBUTEROL SULFATE SCH ML: 2.5; .5 SOLUTION RESPIRATORY (INHALATION) at 01:57

## 2019-03-12 RX ADMIN — LORAZEPAM PRN MG: 2 INJECTION INTRAMUSCULAR; INTRAVENOUS at 14:17

## 2019-03-12 NOTE — PDOC PROGRESS REPORT
Subjective


Progress Note for:: 03/12/19


Subjective:: 


The patient looks horrible today.  He is in less encephalopathic and difficult 

to arouse.  There are no family members at the bedside.  Review of systems could

not be obtained.





Reason For Visit: 


RENAL STONE








Physical Exam


Vital Signs: 


                                        











Temp Pulse Resp BP Pulse Ox


 


 98.4 F   86   16   114/69   98 


 


 03/12/19 08:02  03/12/19 14:00  03/12/19 14:00  03/12/19 08:02  03/12/19 14:00








                                 Intake & Output











 03/11/19 03/12/19 03/13/19





 06:59 06:59 06:59


 


Intake Total 355 5910 1700


 


Output Total 5525 6425 5724


 


Balance -1971 -460 -250


 


Weight 68.8 kg 67.5 kg 











General appearance: PRESENT: other - Extremely ill-appearing.


Head exam: PRESENT: atraumatic, other - He has bitemporal muscle wasting


Eye exam: PRESENT: conjunctiva pink, EOMI, PERRLA.  ABSENT: scleral icterus


Mouth exam: PRESENT: moist, tongue midline


Respiratory exam: PRESENT: clear to auscultation hellen, decreased breath sounds - 

Diminished in the lower bases.  This exam was quite limited as the patient would

not cooperate.  ABSENT: rales, rhonchi, wheezes


Cardiovascular exam: PRESENT: RRR.  ABSENT: diastolic murmur, rubs, systolic 

murmur


GI/Abdominal exam: PRESENT: normal bowel sounds, soft.  ABSENT: distended, 

guarding, mass, organolmegaly, rebound, tenderness


Rectal exam: PRESENT: deferred


Extremities exam: PRESENT: full ROM.  ABSENT: calf tenderness - Now he is Ce

rner, clubbing, pedal edema


Neurological exam: PRESENT: other - The patient was quite difficult to arouse.  

He was quite altered


Psychiatric exam: PRESENT: other - Difficult to arouse


Skin exam: PRESENT: dry, intact, warm.  ABSENT: cyanosis, rash





Results


Laboratory Results: 


                                        





                                 03/11/19 05:35 





                                 03/12/19 04:47 





                                        











  03/12/19





  04:47


 


Sodium  152.9 H


 


Potassium  3.3 L


 


Chloride  121 H


 


Carbon Dioxide  30


 


Anion Gap  2 L


 


BUN  23 H


 


Creatinine  0.82


 


Est GFR ( Amer)  > 60


 


Est GFR (Non-Af Amer)  > 60


 


Glucose  112 H


 


Calcium  9.3


 


Magnesium  2.3








                                        











  02/23/19 02/23/19 02/23/19





  12:00 18:19 18:19


 


Creatine Kinase   23 L 


 


Troponin I  0.021   0.021














  02/24/19 02/24/19 02/24/19





  00:33 00:33 08:13


 


Creatine Kinase  35 L   24 L


 


Troponin I   0.022 














  02/24/19





  08:13


 


Creatine Kinase 


 


Troponin I  < 0.012











Impressions: 


                                        





Abdomen/Pelvis CT  02/23/19 00:00


IMPRESSION:


1. Unchanged nonobstructing 7 mm stone within the mid left ureter.  New 4 mm 

stone within the left renal pelvis.  Unchanged 1.2 cm stone within the superior 

calyx of the left kidney.  No hydronephrosis.


2. Unchanged positioning of the left ureteral stent.


3. No additional changes when compared to 2/22/2019.


 








Chest CT  03/05/19 00:00


IMPRESSION:  Bibasilar pneumonia, aspiration should be considered


 








Guidance Fluoroscopy  03/05/19 00:00


IMPRESSION:  SUCCESSFUL PLACEMENT OF A 5 FR DUAL LUMEN 41 CM PICC IN THE RIGHT 

BASILIC VEIN.


 








Interventional Vascular Procedure  03/05/19 00:00


IMPRESSION:  SUCCESSFUL PLACEMENT OF A 5 FR DUAL LUMEN 41 CM PICC IN THE RIGHT 

BASILIC VEIN.


 








PICC Line Insertion  03/05/19 00:00


IMPRESSION:  SUCCESSFUL PLACEMENT OF A 5 FR DUAL LUMEN 41 CM PICC IN THE RIGHT 

BASILIC VEIN.


 








Head CT  03/06/19 00:00


IMPRESSION:  No acute findings.


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  Diffuse atrophy, stable


EVIDENCE OF ACUTE STROKE: NO.


 








Chest X-Ray  03/08/19 06:00


IMPRESSION:  Persistent bilateral lower lobe pneumonia


 








KUB X-Ray  03/10/19 18:28


IMPRESSION:


 


Enteric tube in place.


 














Assessment & Plan





- Diagnosis


(1) Sepsis


Is this a current diagnosis for this admission?: Yes   


Plan: 


With septic shock at the time of admission.  Secondary to underlying urinary 

tract infection.  He was significantly hypotensive with fever, leukocytosis, 

acute encephalopathy, respiratory failure and evidence of infection.  His sepsis

symptoms have resolved








(2) Acute respiratory failure with hypoxia


Is this a current diagnosis for this admission?: Yes   


Plan: 


Secondary to sepsis as well as aspiration pneumonia.  He continues to require 

oxygen.  Continue IV meropenem and oxygen support as needed.  Aggressive 

breathing treatments as well.








(3) UTI (urinary tract infection)


Is this a current diagnosis for this admission?: Yes   


Plan: 


Continue IV meropenem








(4) Acute encephalopathy


Is this a current diagnosis for this admission?: Yes   


Plan: 


The patient remains acutely encephalopathic.  Secondary to his infectious 

issues, prolonged hospitalization








(5) Aspiration pneumonia


Is this a current diagnosis for this admission?: Yes   


Plan: 


Continue IV meropenem.  I suspect he is chronically aspirating








(6) Coronary artery disease


Is this a current diagnosis for this admission?: Yes   


Plan: 


Continue current regimen








(7) Paroxysmal atrial fibrillation


Is this a current diagnosis for this admission?: Yes   


Plan: 


She appears to be in a sinus rhythm and rate controlled at the time of my visit.








(8) Hematuria


Is this a current diagnosis for this admission?: Yes   


Plan: 


He has continued hematuria.  He has an obstructing stone that needs intervention

but the patient is not stable enough to have this done.  Continue CBI








(9) COPD (chronic obstructive pulmonary disease)


Qualifiers: 


   COPD type: unspecified COPD   Qualified Code(s): J44.9 - Chronic obstructive 

pulmonary disease, unspecified   


Is this a current diagnosis for this admission?: Yes   


Plan: 


No evidence of exacerbation








(10) Rheumatoid arthritis


Is this a current diagnosis for this admission?: Yes   


Plan: 


Stable








(11) Compression fracture


Is this a current diagnosis for this admission?: Yes   


Plan: 


Currently has IV morphine available as needed.  Certainly I believe keeping the 

patient comfortable is quite important at this point.








(12) Pancytopenia


Is this a current diagnosis for this admission?: Yes   


Plan: 


This is acquired pancytopenia secondary to medications.  Hematology was 

consulted and this was there thought.  His antibiotic therapy has been changed 

and he is improving.








(13) History of AAA (abdominal aortic aneurysm) repair


Is this a current diagnosis for this admission?: Yes   


Plan: 


No issues during this hospitalization








(14) BPH (benign prostatic hyperplasia)


Is this a current diagnosis for this admission?: Yes   


Plan: 


Continue Flomax.








(15) Hypernatremia


Is this a current diagnosis for this admission?: Yes   


Plan: 


The patient likely is getting somewhat dry.  I am going to start him on normal 

saline with 20 of K.  He will have a chemistry panel checked in the morning.








(16) Hypokalemia


Is this a current diagnosis for this admission?: Yes   


Plan: 


I am going to place him on IV fluids with potassium.  He will have a chemistry 

panel checked in the morning








(17) Full code status


Is this a current diagnosis for this admission?: Yes   





- Time


Time Spent with patient: 35 or more minutes





- Inpatient Certification


Medical Necessity: Other - Patient hospitalization remains necessary.  Overall 

this patient is doing quite poorly.  He is not responding to therapy.  He is not

stable enough for transfer for any sort of intervention.  Discussions need to be

had with the family regarding the transition to palliative care.  There were no 

family members at the hospital today but I will try to talk to them tomorrow.  I

suspect he will be in the hospital for quite some time due to his multiple 

infectious issues requiring parenteral antibiotics.

## 2019-03-13 LAB
ADD MANUAL DIFF: NO
ANION GAP SERPL CALC-SCNC: 2 MMOL/L (ref 5–19)
BASOPHILS # BLD AUTO: 0 10^3/UL (ref 0–0.2)
BASOPHILS NFR BLD AUTO: 0.1 % (ref 0–2)
BUN SERPL-MCNC: 23 MG/DL (ref 7–20)
CALCIUM: 9 MG/DL (ref 8.4–10.2)
CHLORIDE SERPL-SCNC: 118 MMOL/L (ref 98–107)
CO2 SERPL-SCNC: 29 MMOL/L (ref 22–30)
EOSINOPHIL # BLD AUTO: 0.1 10^3/UL (ref 0–0.6)
EOSINOPHIL NFR BLD AUTO: 1.3 % (ref 0–6)
ERYTHROCYTE [DISTWIDTH] IN BLOOD BY AUTOMATED COUNT: 21.2 % (ref 11.5–14)
GLUCOSE SERPL-MCNC: 94 MG/DL (ref 75–110)
HCT VFR BLD CALC: 27.2 % (ref 37.9–51)
HGB BLD-MCNC: 8.7 G/DL (ref 13.5–17)
LYMPHOCYTES # BLD AUTO: 0.6 10^3/UL (ref 0.5–4.7)
LYMPHOCYTES NFR BLD AUTO: 6 % (ref 13–45)
MCH RBC QN AUTO: 31 PG (ref 27–33.4)
MCHC RBC AUTO-ENTMCNC: 32.1 G/DL (ref 32–36)
MCV RBC AUTO: 97 FL (ref 80–97)
MONOCYTES # BLD AUTO: 0.4 10^3/UL (ref 0.1–1.4)
MONOCYTES NFR BLD AUTO: 4.2 % (ref 3–13)
NEUTROPHILS # BLD AUTO: 8.4 10^3/UL (ref 1.7–8.2)
NEUTS SEG NFR BLD AUTO: 88.4 % (ref 42–78)
PLATELET # BLD: 90 10^3/UL (ref 150–450)
POTASSIUM SERPL-SCNC: 3.8 MMOL/L (ref 3.6–5)
RBC # BLD AUTO: 2.82 10^6/UL (ref 4.35–5.55)
SODIUM SERPL-SCNC: 148.6 MMOL/L (ref 137–145)
TOTAL CELLS COUNTED % (AUTO): 100 %
WBC # BLD AUTO: 9.5 10^3/UL (ref 4–10.5)

## 2019-03-13 RX ADMIN — FAMOTIDINE SCH MG: 20 TABLET, FILM COATED ORAL at 21:12

## 2019-03-13 RX ADMIN — Medication SCH UNIT: at 21:11

## 2019-03-13 RX ADMIN — MORPHINE SULFATE PRN MG: 10 INJECTION INTRAMUSCULAR; INTRAVENOUS; SUBCUTANEOUS at 19:23

## 2019-03-13 RX ADMIN — BUDESONIDE SCH MG: 0.5 SUSPENSION RESPIRATORY (INHALATION) at 08:32

## 2019-03-13 RX ADMIN — CALCIUM CARBONATE-CHOLECALCIFEROL TAB 250 MG-125 UNIT SCH TAB: 250-125 TAB at 19:23

## 2019-03-13 RX ADMIN — NYSTATIN SCH: 100000 SUSPENSION ORAL at 12:50

## 2019-03-13 RX ADMIN — IPRATROPIUM BROMIDE AND ALBUTEROL SULFATE SCH ML: 2.5; .5 SOLUTION RESPIRATORY (INHALATION) at 08:32

## 2019-03-13 RX ADMIN — BUDESONIDE SCH MG: 0.5 SUSPENSION RESPIRATORY (INHALATION) at 20:09

## 2019-03-13 RX ADMIN — DIPHENHYDRAMINE HYDROCHLORIDE PRN MG: 50 INJECTION INTRAMUSCULAR; INTRAVENOUS at 08:22

## 2019-03-13 RX ADMIN — MORPHINE SULFATE PRN MG: 10 INJECTION INTRAMUSCULAR; INTRAVENOUS; SUBCUTANEOUS at 16:18

## 2019-03-13 RX ADMIN — Medication SCH UNIT: at 11:05

## 2019-03-13 RX ADMIN — SODIUM CHLORIDE SCH ML: 9 INJECTION INTRAMUSCULAR; INTRAVENOUS; SUBCUTANEOUS at 11:08

## 2019-03-13 RX ADMIN — LORAZEPAM PRN MG: 2 INJECTION INTRAMUSCULAR; INTRAVENOUS at 11:03

## 2019-03-13 RX ADMIN — MORPHINE SULFATE PRN MG: 10 INJECTION INTRAMUSCULAR; INTRAVENOUS; SUBCUTANEOUS at 17:45

## 2019-03-13 RX ADMIN — MORPHINE SULFATE PRN MG: 10 INJECTION INTRAMUSCULAR; INTRAVENOUS; SUBCUTANEOUS at 14:02

## 2019-03-13 RX ADMIN — MEROPENEM SCH MLS/HR: 1 INJECTION INTRAVENOUS at 06:19

## 2019-03-13 RX ADMIN — SODIUM CHLORIDE SCH ML: 9 INJECTION INTRAMUSCULAR; INTRAVENOUS; SUBCUTANEOUS at 22:03

## 2019-03-13 RX ADMIN — DILTIAZEM HYDROCHLORIDE SCH MG: 30 TABLET, FILM COATED ORAL at 06:20

## 2019-03-13 RX ADMIN — LORAZEPAM PRN MG: 2 INJECTION INTRAMUSCULAR; INTRAVENOUS at 12:51

## 2019-03-13 RX ADMIN — NYSTATIN SCH UNIT: 100000 SUSPENSION ORAL at 16:22

## 2019-03-13 RX ADMIN — DILTIAZEM HYDROCHLORIDE SCH MG: 30 TABLET, FILM COATED ORAL at 11:07

## 2019-03-13 RX ADMIN — MORPHINE SULFATE PRN MG: 10 INJECTION INTRAMUSCULAR; INTRAVENOUS; SUBCUTANEOUS at 11:03

## 2019-03-13 RX ADMIN — FAMOTIDINE SCH MG: 20 TABLET, FILM COATED ORAL at 11:07

## 2019-03-13 RX ADMIN — DIPHENHYDRAMINE HYDROCHLORIDE PRN MG: 50 INJECTION INTRAMUSCULAR; INTRAVENOUS at 12:53

## 2019-03-13 RX ADMIN — LORAZEPAM PRN MG: 2 INJECTION INTRAMUSCULAR; INTRAVENOUS at 19:23

## 2019-03-13 RX ADMIN — QUETIAPINE FUMARATE SCH MG: 25 TABLET, FILM COATED ORAL at 21:12

## 2019-03-13 RX ADMIN — DILTIAZEM HYDROCHLORIDE SCH MG: 30 TABLET, FILM COATED ORAL at 19:22

## 2019-03-13 RX ADMIN — IPRATROPIUM BROMIDE AND ALBUTEROL SULFATE SCH ML: 2.5; .5 SOLUTION RESPIRATORY (INHALATION) at 02:09

## 2019-03-13 RX ADMIN — MORPHINE SULFATE PRN MG: 10 INJECTION INTRAMUSCULAR; INTRAVENOUS; SUBCUTANEOUS at 12:52

## 2019-03-13 RX ADMIN — DILTIAZEM HYDROCHLORIDE SCH MG: 30 TABLET, FILM COATED ORAL at 01:55

## 2019-03-13 RX ADMIN — IPRATROPIUM BROMIDE AND ALBUTEROL SULFATE SCH ML: 2.5; .5 SOLUTION RESPIRATORY (INHALATION) at 13:46

## 2019-03-13 RX ADMIN — Medication SCH ML: at 11:05

## 2019-03-13 RX ADMIN — HYDROCORTISONE SODIUM SUCCINATE SCH MG: 100 INJECTION, POWDER, FOR SOLUTION INTRAMUSCULAR; INTRAVENOUS at 11:06

## 2019-03-13 RX ADMIN — Medication SCH ML: at 19:24

## 2019-03-13 RX ADMIN — POTASSIUM CHLORIDE SCH MEQ: 1.5 SOLUTION ORAL at 11:06

## 2019-03-13 RX ADMIN — POTASSIUM CHLORIDE SCH MEQ: 1.5 SOLUTION ORAL at 19:22

## 2019-03-13 RX ADMIN — NYSTATIN SCH: 100000 SUSPENSION ORAL at 15:19

## 2019-03-13 RX ADMIN — IPRATROPIUM BROMIDE AND ALBUTEROL SULFATE SCH ML: 2.5; .5 SOLUTION RESPIRATORY (INHALATION) at 20:00

## 2019-03-13 RX ADMIN — MORPHINE SULFATE PRN MG: 10 INJECTION INTRAMUSCULAR; INTRAVENOUS; SUBCUTANEOUS at 08:50

## 2019-03-13 RX ADMIN — NYSTATIN SCH UNIT: 100000 SUSPENSION ORAL at 22:03

## 2019-03-13 RX ADMIN — QUETIAPINE FUMARATE SCH MG: 25 TABLET, FILM COATED ORAL at 11:06

## 2019-03-13 RX ADMIN — MORPHINE SULFATE PRN MG: 10 INJECTION INTRAMUSCULAR; INTRAVENOUS; SUBCUTANEOUS at 07:38

## 2019-03-13 RX ADMIN — DIPHENHYDRAMINE HYDROCHLORIDE PRN MG: 50 INJECTION INTRAMUSCULAR; INTRAVENOUS at 19:27

## 2019-03-13 RX ADMIN — POTASSIUM CHLORIDE SCH MEQ: 1.5 SOLUTION ORAL at 15:17

## 2019-03-13 RX ADMIN — DILTIAZEM HYDROCHLORIDE SCH MG: 30 TABLET, FILM COATED ORAL at 23:53

## 2019-03-13 RX ADMIN — MORPHINE SULFATE PRN MG: 10 INJECTION INTRAMUSCULAR; INTRAVENOUS; SUBCUTANEOUS at 22:20

## 2019-03-13 RX ADMIN — CALCIUM CARBONATE-CHOLECALCIFEROL TAB 250 MG-125 UNIT SCH TAB: 250-125 TAB at 11:07

## 2019-03-13 RX ADMIN — LORAZEPAM PRN MG: 2 INJECTION INTRAMUSCULAR; INTRAVENOUS at 15:12

## 2019-03-13 RX ADMIN — MORPHINE SULFATE PRN MG: 10 INJECTION INTRAMUSCULAR; INTRAVENOUS; SUBCUTANEOUS at 15:12

## 2019-03-13 NOTE — PDOC PROGRESS REPORT
Subjective


Progress Note for:: 03/13/19


Subjective:: 





Patient remains on BiPAP.  Nurses report that he desats quickly when this is 

removed.  He is still crying and writhing in pain constantly.  Although nurses 

are giving him Morphine every hour, he still appears to be in constant pain.  He

has not responded to any of our treatments and is not stable to transfer to have

kidney stone removed.  


Reason For Visit: 


RENAL STONE








Physical Exam


Vital Signs: 


                                        











Temp Pulse Resp BP Pulse Ox


 


 99.7 F   28 L  21 H  119/78   90 L


 


 03/13/19 03:33  03/13/19 03:33  03/13/19 03:33  03/13/19 03:33  03/13/19 03:33








                                 Intake & Output











 03/12/19 03/13/19 03/14/19





 06:59 06:59 06:59


 


Intake Total 5965 6334 


 


Output Total 6413 2450 


 


Balance -460 3884 


 


Weight 67.5 kg 71.1 kg 











General appearance: PRESENT: well-nourished


Head exam: PRESENT: normocephalic


Mouth exam: PRESENT: other - BiPAP mask in palce


Neurological exam: PRESENT: other - withdraws to any touch.


Focused psych exam: PRESENT: restlessness


Skin exam: PRESENT: pallor





Results


Laboratory Results: 


                                        





                                 03/13/19 06:44 





                                 03/13/19 06:44 





                                        











  03/13/19 03/13/19





  06:44 06:44


 


WBC  9.5 


 


RBC  2.82 L 


 


Hgb  8.7 L 


 


Hct  27.2 L 


 


MCV  97 


 


MCH  31.0 


 


MCHC  32.1 


 


RDW  21.2 H 


 


Plt Count  90 L 


 


Seg Neutrophils %  88.4 H 


 


Lymphocytes %  6.0 L 


 


Monocytes %  4.2 


 


Eosinophils %  1.3 


 


Basophils %  0.1 


 


Absolute Neutrophils  8.4 H 


 


Absolute Lymphocytes  0.6 


 


Absolute Monocytes  0.4 


 


Absolute Eosinophils  0.1 


 


Absolute Basophils  0.0 


 


Sodium   148.6 H


 


Potassium   3.8


 


Chloride   118 H


 


Carbon Dioxide   29


 


Anion Gap   2 L


 


BUN   23 H


 


Creatinine   0.64


 


Est GFR ( Amer)   > 60


 


Est GFR (Non-Af Amer)   > 60


 


Glucose   94


 


Calcium   9.0


 


Magnesium   2.1








                                        











  02/23/19 02/23/19 02/23/19





  12:00 18:19 18:19


 


Creatine Kinase   23 L 


 


Troponin I  0.021   0.021














  02/24/19 02/24/19 02/24/19





  00:33 00:33 08:13


 


Creatine Kinase  35 L   24 L


 


Troponin I   0.022 














  02/24/19





  08:13


 


Creatine Kinase 


 


Troponin I  < 0.012











Impressions: 


                                        





Abdomen/Pelvis CT  02/23/19 00:00


IMPRESSION:


1. Unchanged nonobstructing 7 mm stone within the mid left ureter.  New 4 mm 

stone within the left renal pelvis.  Unchanged 1.2 cm stone within the superior 

calyx of the left kidney.  No hydronephrosis.


2. Unchanged positioning of the left ureteral stent.


3. No additional changes when compared to 2/22/2019.


 








Chest CT  03/05/19 00:00


IMPRESSION:  Bibasilar pneumonia, aspiration should be considered


 








Guidance Fluoroscopy  03/05/19 00:00


IMPRESSION:  SUCCESSFUL PLACEMENT OF A 5 FR DUAL LUMEN 41 CM PICC IN THE RIGHT 

BASILIC VEIN.


 








Interventional Vascular Procedure  03/05/19 00:00


IMPRESSION:  SUCCESSFUL PLACEMENT OF A 5 FR DUAL LUMEN 41 CM PICC IN THE RIGHT 

BASILIC VEIN.


 








PICC Line Insertion  03/05/19 00:00


IMPRESSION:  SUCCESSFUL PLACEMENT OF A 5 FR DUAL LUMEN 41 CM PICC IN THE RIGHT 

BASILIC VEIN.


 








Head CT  03/06/19 00:00


IMPRESSION:  No acute findings.


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  Diffuse atrophy, stable


EVIDENCE OF ACUTE STROKE: NO.


 








Chest X-Ray  03/08/19 06:00


IMPRESSION:  Persistent bilateral lower lobe pneumonia


 








KUB X-Ray  03/10/19 18:28


IMPRESSION:


 


Enteric tube in place.


 














Assessment & Plan





- Diagnosis


(1) Pancytopenia, acquired


Is this a current diagnosis for this admission?: Yes   





(2) Altered mental status


Qualifiers: 


   Altered mental status type: unspecified   Qualified Code(s): R41.82 - Altered

mental status, unspecified   


Is this a current diagnosis for this admission?: Yes   





(3) Rheumatoid arthritis


Is this a current diagnosis for this admission?: Yes   





- Plan Summary


Plan Summary: 





I have discussed with hospitalists and staff.  I will try to arrange another 

family meeting with son to explain that patient continues to suffer.  I will 

increase his duragesic patch.  Stop the antibiotcs (he has completed his full 

course).  Will discuss trying to increase his comfort with further pain control 

but work toward comfort measures only, as I do not see any further hope for 

improvement in his overall situation.

## 2019-03-13 NOTE — PDOC TRANSFER SUMMARY
General


Admission Date/PCP: 


  02/23/19 15:46





  KATELIN PICHARDO DO


Urologist: Dr. Morrison and Dr. Denton Lopes at [facility]


Accepting Facility: Other (Comments) - [facility] in [facility]


Accepting Physician: Dr. Torrez


Resuscitation Status: Full Code





- Transfer Diagnosis


(1) Sepsis


Is this a current diagnosis for this admission?: Yes   


Diagnosis Summary: 


With septic shock at the time of admission secondary to underlying urinary tract

infection.  At the time of admission he was significantly hypotensive with 

fever, leukocytosis, acute encephalopathy, respiratory failure and evidence of 

infection.  His sepsis symptoms have resolved although he does still remain 

hypoxic.








(2) Acute respiratory failure with hypoxia


Is this a current diagnosis for this admission?: Yes   


Diagnosis Summary: 


Secondary to sepsis.  Later in the hospitalization the patient aspirated and he 

developed an aspiration pneumonia.  He also has underlying COPD.  He has 

completed a course of IV meropenem.  Currently on BiPAP support.  He has been 

receiving aggressive breathing treatments.








(3) UTI (urinary tract infection)


Is this a current diagnosis for this admission?: Yes   


Diagnosis Summary: 


ESBL producing E. coli urinary tract infection.  Initially on Zosyn but later 

changed to meropenem as he developed pancytopenia.  He is completed a course of 

therapy.  His urinary tract infection and sepsis ultimately was due to a 

nonobstructing ureter stone








(4) Ureteral stone


Is this a current diagnosis for this admission?: Yes   


Diagnosis Summary: 


The patient has a nonobstructing stone in the left ureter that is causing 

recurrent urinary tract infections with sepsis.  He has not been stable enough 

to pursue intervention








(5) Acute encephalopathy


Is this a current diagnosis for this admission?: Yes   


Diagnosis Summary: 


Multifactorial secondary to hypoxia, prolonged hospitalization acute infection 

and hospital delirium.  Also the patient has a significant amount of pain.








(6) Aspiration pneumonia


Is this a current diagnosis for this admission?: Yes   


Diagnosis Summary: 


The patient has completed a course of IV meropenem.  I repeated his chest x-ray 

today and he has persistent infiltrates.  We will hold off on further 

antibiotics as he is being transferred.








(7) Coronary artery disease


Is this a current diagnosis for this admission?: Yes   


Diagnosis Summary: 


No issues during this hospitalization.  We are giving him medications via his NG

tube








(8) Paroxysmal atrial fibrillation


Is this a current diagnosis for this admission?: Yes   


Diagnosis Summary: 


Currently rate controlled.  He is not on anticoagulation








(9) Hematuria


Is this a current diagnosis for this admission?: Yes   


Diagnosis Summary: 


He has been on CBI.  We stopped this temporarily today as his urine was clear.








(10) COPD (chronic obstructive pulmonary disease)


Is this a current diagnosis for this admission?: Yes   


Diagnosis Summary: 


No evidence of exacerbation.  He has been on stress dose steroids here at the 

hospital which we will continue for now








(11) Rheumatoid arthritis


Is this a current diagnosis for this admission?: Yes   


Diagnosis Summary: 


The patient is having a significant amount of pain








(12) Compression fracture


Is this a current diagnosis for this admission?: Yes   


Diagnosis Summary: 


The patient has a significant amount of pain.  The family did the oncology 

service started him on a fentanyl patch which was increased today.  He has been 

receiving 1 mg of IV morphine every 1-2 hours as well.  The family does agree 

that we need to keep him comfortable while we are pursuing what his options are.








(13) Pancytopenia


Is this a current diagnosis for this admission?: Yes   


Diagnosis Summary: 


Likely due to medications.  Hematology was consulted and has been following the 

patient during this hospitalization








(14) History of AAA (abdominal aortic aneurysm) repair


Is this a current diagnosis for this admission?: Yes   


Diagnosis Summary: 


No issues








(15) BPH (benign prostatic hyperplasia)


Is this a current diagnosis for this admission?: Yes   


Diagnosis Summary: 


He has three-way catheter in place.








(16) Hypernatremia


Is this a current diagnosis for this admission?: Yes   


Diagnosis Summary: 


Continue free water flushes via his PEG tube.  His hypernatremia improved after 

receiving some IV fluids last night








(17) Hypokalemia


Is this a current diagnosis for this admission?: Yes   


Diagnosis Summary: 


Repleted and resolved








(18) Full code status


Is this a current diagnosis for this admission?: Yes   


Diagnosis Summary: 


Myself and Dr. López discussed CODE STATUS with the patient's son today.  He 

adamantly wants his father to remain a full code at least until he is evaluated 

at a tertiary center.  The patient's son is Mr. Bowden [phone].  He has 

the prior patient's primary decision maker








- Transfer Medications


Transfer Medications: 


                               Current Medications





Acetaminophen (Tylenol 325 Mg Tablet)  650 mg NG Q4HP PRN


   PRN Reason: FEVER >101


   Stop: 04/09/19 19:13


Albuterol/Ipratropium (Duoneb 3 Ml Ampul)  3 ml NEB RTQ6 GILBERTO


   Stop: 04/07/19 13:59


   Last Admin: 03/13/19 13:46 Dose:  3 ml


   Documented by: 


Amino Acid Protein (Prosource Tf 11 Gm Protein/45 Ml Pkt)  45 ml NG BID GILBERTO


   Stop: 04/10/19 17:59


   Last Admin: 03/13/19 19:24 Dose:  45 ml


   Documented by: 


Budesonide (Pulmicort Neb 0.5 Mg/2 Ml Ampul)  0.5 mg NEB RTQ12 GILBERTO


   Stop: 04/07/19 19:59


   Last Admin: 03/13/19 08:32 Dose:  0.5 mg


   Documented by: 


Calcium Carbonate (Os-Christiano 250 Mg With Vitamin D 125 Units)  1 tab NG BID [facility]


   Stop: 04/10/19 09:59


   Last Admin: 03/13/19 19:23 Dose:  1 tab


   Documented by: 


Diltiazem HCl (Cardizem 30 Mg Tablet)  30 mg NG Q6 GILBERTO


   Stop: 04/11/19 00:00


   Last Admin: 03/13/19 19:22 Dose:  30 mg


   Documented by: 


Diphenhydramine HCl (Benadryl Inj 50 Mg/1 Ml Vial)  12.5 mg IV Q4HP PRN


   PRN Reason: ITCHING


   Stop: 04/07/19 18:35


   Last Admin: 03/13/19 19:27 Dose:  12.5 mg


   Documented by: 


Famotidine (Pepcid 20 Mg Tablet)  20 mg NG Q12 GILBERTO


   Stop: 04/09/19 21:59


   Last Admin: 03/13/19 11:07 Dose:  20 mg


   Documented by: 


Fentanyl (Duragesic 50 Mcg/Hr Transdermal Patch)  1 each TD Q3D@1000 [facility]


   Stop: 03/20/19 09:59


   Last Admin: 03/13/19 11:07 Dose:  1 each


   Documented by: 


Heparin Sodium (Porcine) (Heparin Flush 10 Unit/Ml 5 Ml Disp.Syrg)  30 unit IV 

Q12 GILBERTO


   Stop: 04/04/19 21:59


   Last Admin: 03/13/19 11:05 Dose:  30 unit


   Documented by: 


Heparin Sodium (Porcine) (Heparin Flush 10 Unit/Ml 5 Ml Disp.Syrg)  30 unit IV 

.AFTER EACH USE PRN


   Stop: 04/04/19 11:59


   Last Admin: 03/10/19 05:41 Dose:  30 unit


   Documented by: 


Hydrocortisone Sodium Succinate (Solu-Cortef Inj/Pf 100 Mg/ 2 Ml Sdv)  50 mg IV 

DAILY [facility]


   Stop: 04/08/19 09:59


   Last Admin: 03/13/19 11:06 Dose:  50 mg


   Documented by: 


Diltiazem HCl (Cardizem Rtu Inj 125 Mg-D5w 125 Ml Premix)  125 mg in 125 mls @ 0

mls/hr IV CONTINUOUS PRN; Protocol


   PRN Reason: THIS MED IS NOT "PRN"


   Stop: 04/10/19 00:40


   Last Titration: 03/11/19 16:13 Dose:  0 mls/hr, 0 mls/hr


   Documented by: 


Influenza Virus Vaccine Quadrival (Fluarix Adlt Quad 2018-19 Vac 0.5 Ml Syr)  

0.5 ml IM .DISCHARGE PRN


   PRN Reason: THIS MED IS NOT "PRN"


   Stop: 03/25/19 17:39


Levalbuterol HCl (Xopenex Neb 1.25 Mg/3 Ml Ampul)  1.25 mg NEB RTQ6HP PRN


   PRN Reason: FOR WHEEZING


   Stop: 03/25/19 15:59


Lorazepam (Ativan Inj 2 Mg/1 Ml Vial)  1 mg IV Q2HP PRN


   PRN Reason: ANXIETY/AGITATION


   Stop: 03/19/19 18:59


   Last Admin: 03/13/19 19:23 Dose:  1 mg


   Documented by: 


Morphine Sulfate (Morphine 10 Mg/Ml Inj)  1 mg IV Q1HP PRN


   PRN Reason: FOR BACK PAIN


   Stop: 03/20/19 14:01


   Last Admin: 03/13/19 19:23 Dose:  1 mg


   Documented by: 


Nystatin (Mycostatin 500,000 Unit/5 Ml Susp Udcup)  500,000 unit PO QID GILBERTO


   Stop: 03/20/19 15:59


   Last Admin: 03/13/19 16:22 Dose:  500,000 unit


   Documented by: 


Ondansetron HCl (Zofran Inj/Pf 4 Mg/2 Ml Sdv)  4 mg IV Q8HP PRN


   PRN Reason: FOR NAUSEA/VOMITING


   Stop: 03/25/19 15:38


   Last Admin: 02/27/19 07:12 Dose:  4 mg


   Documented by: 


Pantoprazole Sodium (Protonix Iv Inj 40 Mg Vial)  40 mg IV DAILY [facility]


   Stop: 03/16/19 09:59


   Last Admin: 03/13/19 11:06 Dose:  40 mg


   Documented by: 


Pharmacy Profile Note (Medication Communication Order)  1 each [facility] .NOTICE NR


   Stop: 04/09/19 18:29


Potassium Chloride (Kaon-Cl 20 Meq/15 Ml Udcup)  20 meq NG TID GILBERTO


   Stop: 04/11/19 09:59


   Last Admin: 03/13/19 19:22 Dose:  20 meq


   Documented by: 


Quetiapine Fumarate (Seroquel 25 Mg Tablet)  25 mg NG Q12 GILBERTO


   Stop: 04/09/19 21:59


   Last Admin: 03/13/19 11:06 Dose:  25 mg


   Documented by: 


Sodium Chloride (Nacl 0.9% Inj/Pf 10 Ml Sdv)  10 ml IV Q12 GILBERTO


   Stop: 04/04/19 21:59


   Last Admin: 03/13/19 11:08 Dose:  10 ml


   Documented by: 


Sodium Chloride (Nacl 0.9% Inj/Pf 10 Ml Sdv)  10 ml IV .AFTER EACH USE PRN


   Stop: 04/04/19 11:59


   Last Admin: 03/10/19 05:41 Dose:  10 ml


   Documented by: 











- Allergies


Allergies/Adverse Reactions: 


                                        





codeine Allergy (Verified 02/22/19 01:25)


   











- Diet/Activity


Discharge Diet: Cardiac





Hospital Course


Hospital Course: 





Please see complete record for details.  This was a prolonged hospitalization 

and this will be a brief summary.





The patient is an unfortunate 73-year-old  gentleman who has had a 

complex recent past medical history.  The best I can tell from talking to the 

patient's son he was recently hospitalized at [facility] in [place] with a 

nonobstructing stone in the left ureter.  He was treated for sepsis and a 

urinary tract infection.  I believe they wanted him to recuperate a bit before 

having intervention for the stone and he was discharged.  Before the patient 

could get back for his follow-up appointment he developed sepsis again.  He was 

brought to this facility and was found to have evidence of septic shock 

requiring ICU admission.  He was on pressors for a period of time with 

respiratory failure, leukocytosis and evidence of acute infection.  He was 

treated with broad-spectrum IV antibiotics.  Urine culture grew and ESBL E. 

coli.  Unfortunately his hospitalization was complicated by the development of 

aspiration pneumonia with worsening respiratory failure.  He has intermittently 

been on and off of BiPAP but for the past several days we have been unable to 

wean him off.  An NG tube had been placed to aid with nutrition.  He was 

continued on broad-spectrum IV antibiotics but unfortunately developed 

pancytopenia.  Hematology was consulted and recommended changing his antibiotic 

therapy to IV meropenem.  At this point he is completed a course of IV meropenem

here in the hospital and this was stopped this morning.  The patient has had a 

three-way catheter and has been on CBI for most of this hospitalization.  Every 

time he was taken off he developed worsening hematuria.  CBI was stopped again 

this morning and currently at the time of transfer his urine is clear.  Overall 

the patient just has not done well.  Multiple providers have had multiple c

onversations with the patient's son regarding his poor prognosis.  The patient's

son would like a second opinion and further treatment at a tertiary center where

urology is available before making a decision any further decisions.  I did 

discuss CODE STATUS with the son prior this afternoon.  He adamantly would like 

the patient to be remain a full code at least until he has been evaluated and 

treated at a tertiary care center.  The patient's son would really like him to 

be transferred to a facility in [facility] as most of the patient's family

lives nearby.  It would make it easier for them to get to the hospital and 

should the patient ultimately not survive this hospitalization it would be much 

easier for the family going forward.





[facility]rez [facility].  He is the intensivist in the ICU.  He has graciously agreed to accept

this patient in transfer.  I know it is going to make the family feel much 

better and I certainly appreciate his assistance.  At this point he will be 

transferred to a tertiary center and be direct admitted to their ICU.  He is in 

poor but stable condition and I believe he can travel [place] to [place].  He will 

be transferred as soon as a bed is found.





Physical Exam


Vital Signs: 


                                        











Temp Pulse Resp BP Pulse Ox


 


 98.5 F   98   25 H  122/74   97 


 


 03/13/19 17:05  03/13/19 17:05  03/13/19 17:05  03/13/19 17:05  03/13/19 17:05








                                 Intake & Output











 03/12/19 03/13/19 03/14/19





 06:59 06:59 06:59


 


Intake Total 5925 6334 1050


 


Output Total 6425 7350 1850


 


Balance -460 7304 -800


 


Weight 67.5 kg 71.1 kg 











General appearance: PRESENT: other - Acutely ill-appearing gentleman.  He is 

wearing BiPAP and has an NG tube in place.  He is acutely confused and appears 

to be somewhat uncomfortable


Head exam: PRESENT: atraumatic, normocephalic


Respiratory exam: PRESENT: other - Coarse breath sounds throughout all lung 

fields anteriorly


Cardiovascular exam: PRESENT: RRR.  ABSENT: diastolic murmur, rubs, systolic 

murmur


GI/Abdominal exam: PRESENT: normal bowel sounds, soft.  ABSENT: distended, 

guarding, mass, organolmegaly, rebound, tenderness


Musculoskeletal exam: ABSENT: ambulatory


Neurological exam: PRESENT: altered


Psychiatric exam: PRESENT: agitated, anxious


Skin exam: PRESENT: dry, intact, warm.  ABSENT: cyanosis, rash





Results


Laboratory Results: 


                                        





                                 03/13/19 06:44 





                                 03/13/19 06:44 





                                        











  03/13/19 03/13/19





  06:44 06:44


 


WBC  9.5 


 


RBC  2.82 L 


 


Hgb  8.7 L 


 


Hct  27.2 L 


 


MCV  97 


 


MCH  31.0 


 


MCHC  32.1 


 


RDW  21.2 H 


 


Plt Count  90 L 


 


Seg Neutrophils %  88.4 H 


 


Lymphocytes %  6.0 L 


 


Monocytes %  4.2 


 


Eosinophils %  1.3 


 


Basophils %  0.1 


 


Absolute Neutrophils  8.4 H 


 


Absolute Lymphocytes  0.6 


 


Absolute Monocytes  0.4 


 


Absolute Eosinophils  0.1 


 


Absolute Basophils  0.0 


 


Sodium   148.6 H


 


Potassium   3.8


 


Chloride   118 H


 


Carbon Dioxide   29


 


Anion Gap   2 L


 


BUN   23 H


 


Creatinine   0.64


 


Est GFR ( Amer)   > 60


 


Est GFR (Non-Af Amer)   > 60


 


Glucose   94


 


Calcium   9.0


 


Magnesium   2.1








                                        











  02/23/19 02/23/19 02/23/19





  12:00 18:19 18:19


 


Creatine Kinase   23 L 


 


Troponin I  0.021   0.021














  02/24/19 02/24/19 02/24/19





  00:33 00:33 08:13


 


Creatine Kinase  35 L   24 L


 


Troponin I   0.022 














  02/24/19





  08:13


 


Creatine Kinase 


 


Troponin I  < 0.012











Impressions: 


                                        





Abdomen/Pelvis CT  02/23/19 00:00


IMPRESSION:


1. Unchanged nonobstructing 7 mm stone within the mid left ureter.  New 4 mm 

stone within the left renal pelvis.  Unchanged 1.2 cm stone within the superior 

calyx of the left kidney.  No hydronephrosis.


2. Unchanged positioning of the left ureteral stent.


3. No additional changes when compared to 2/22/2019.


 








Chest CT  03/05/19 00:00


IMPRESSION:  Bibasilar pneumonia, aspiration should be considered


 








Guidance Fluoroscopy  03/05/19 00:00


IMPRESSION:  SUCCESSFUL PLACEMENT OF A 5 FR DUAL LUMEN 41 CM PICC IN THE RIGHT 

BASILIC VEIN.


 








Interventional Vascular Procedure  03/05/19 00:00


IMPRESSION:  SUCCESSFUL PLACEMENT OF A 5 FR DUAL LUMEN 41 CM PICC IN THE RIGHT 

BASILIC VEIN.


 








PICC Line Insertion  03/05/19 00:00


IMPRESSION:  SUCCESSFUL PLACEMENT OF A 5 FR DUAL LUMEN 41 CM PICC IN THE RIGHT 

BASILIC VEIN.


 








Head CT  03/06/19 00:00


IMPRESSION:  No acute findings.


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  Diffuse atrophy, stable


EVIDENCE OF ACUTE STROKE: NO.


 








KUB X-Ray  03/10/19 18:28


IMPRESSION:


 


Enteric tube in place.


 








Chest X-Ray  03/13/19 00:00


IMPRESSION:  1. Subtle bibasilar heterogeneous opacities not significantly 

changed from prior examination.


2.  Interval placement of esophagogastric tube, tip below the diaphragm, side 

port not clearly visualized although likely in the vicinity of the 

gastroesophageal junction.


 














Plan


Discharge Plan: 





The patient will be transferred to [facility] under the care of

intensivist Dr. Torrez as soon as a bed is found.

## 2019-03-13 NOTE — RADIOLOGY REPORT (SQ)
EXAM DESCRIPTION:  CHEST SINGLE VIEW



COMPLETED DATE/TIME:  3/13/2019 6:44 pm



REASON FOR STUDY:  sob



COMPARISON:  3/8/2019



EXAM PARAMETERS:  NUMBER OF VIEWS: One view.

TECHNIQUE: Single frontal radiographic view of the chest acquired.

RADIATION DOSE: NA

LIMITATIONS: None.



FINDINGS:  LUNGS AND PLEURA: Subtle bibasilar heterogeneous opacities not significantly changed from 
prior examination.

MEDIASTINUM AND HILAR STRUCTURES: No masses.  Contour normal.

HEART AND VASCULAR STRUCTURES: Heart normal in size.  Normal vasculature.

BONES: No acute findings.

HARDWARE: None in the chest.

OTHER: Interval placement of esophagogastric tube, tip below the diaphragm, side port not clearly vis
ualized although likely in the vicinity of the gastroesophageal junction.



IMPRESSION:  1. Subtle bibasilar heterogeneous opacities not significantly changed from prior examina
tion.

2.  Interval placement of esophagogastric tube, tip below the diaphragm, side port not clearly visual
ized although likely in the vicinity of the gastroesophageal junction.



TECHNICAL DOCUMENTATION:  JOB ID:  [id number]

 2011 [employer]- All Rights Reserved



Reading location - IP/workstation name: [email or web address]

## 2019-03-14 VITALS — SYSTOLIC BLOOD PRESSURE: 134 MMHG | DIASTOLIC BLOOD PRESSURE: 68 MMHG

## 2019-03-14 LAB
ARTERIAL BLOOD FIO2: (no result)
ARTERIAL BLOOD H2CO3: 1.1 MMOL/L (ref 1.05–1.35)
ARTERIAL BLOOD HCO3: 27 MMOL/L (ref 20–24)
ARTERIAL BLOOD PCO2: 36.6 MMHG (ref 35–45)
ARTERIAL BLOOD PH: 7.49 (ref 7.35–7.45)
ARTERIAL BLOOD PO2: 108.6 MMHG (ref 80–100)
ARTERIAL BLOOD TOTAL CO2: 28.1 MMOL/L (ref 23–27)
BASE EXCESS BLDA CALC-SCNC: 3.4 MMOL/L
SAO2 % BLDA: 98.3 % (ref 94–98)

## 2019-03-14 RX ADMIN — IPRATROPIUM BROMIDE AND ALBUTEROL SULFATE SCH ML: 2.5; .5 SOLUTION RESPIRATORY (INHALATION) at 02:14
